# Patient Record
Sex: FEMALE | HISPANIC OR LATINO | Employment: OTHER | URBAN - METROPOLITAN AREA
[De-identification: names, ages, dates, MRNs, and addresses within clinical notes are randomized per-mention and may not be internally consistent; named-entity substitution may affect disease eponyms.]

---

## 2017-02-22 ENCOUNTER — OFFICE VISIT (OUTPATIENT)
Dept: INTERNAL MEDICINE | Facility: MEDICAL CENTER | Age: 27
End: 2017-02-22
Payer: MEDICAID

## 2017-02-22 ENCOUNTER — HOSPITAL ENCOUNTER (OUTPATIENT)
Facility: MEDICAL CENTER | Age: 27
End: 2017-02-22
Attending: INTERNAL MEDICINE
Payer: MEDICAID

## 2017-02-22 VITALS
DIASTOLIC BLOOD PRESSURE: 84 MMHG | TEMPERATURE: 97.4 F | WEIGHT: 224.8 LBS | BODY MASS INDEX: 50.57 KG/M2 | SYSTOLIC BLOOD PRESSURE: 136 MMHG | OXYGEN SATURATION: 98 % | HEIGHT: 56 IN | HEART RATE: 85 BPM

## 2017-02-22 DIAGNOSIS — E66.01 MORBID OBESITY, UNSPECIFIED OBESITY TYPE (HCC): ICD-10-CM

## 2017-02-22 DIAGNOSIS — R73.03 PREDIABETES: ICD-10-CM

## 2017-02-22 DIAGNOSIS — Z00.00 HEALTHCARE MAINTENANCE: ICD-10-CM

## 2017-02-22 DIAGNOSIS — B96.89 BACTERIAL VAGINOSIS: ICD-10-CM

## 2017-02-22 DIAGNOSIS — E55.9 VITAMIN D DEFICIENCY: ICD-10-CM

## 2017-02-22 DIAGNOSIS — N76.0 BACTERIAL VAGINOSIS: ICD-10-CM

## 2017-02-22 DIAGNOSIS — N89.8 VAGINAL DISCHARGE: ICD-10-CM

## 2017-02-22 DIAGNOSIS — G47.33 OSA (OBSTRUCTIVE SLEEP APNEA): ICD-10-CM

## 2017-02-22 PROCEDURE — 87491 CHLMYD TRACH DNA AMP PROBE: CPT

## 2017-02-22 PROCEDURE — 99000 SPECIMEN HANDLING OFFICE-LAB: CPT | Mod: GC | Performed by: INTERNAL MEDICINE

## 2017-02-22 PROCEDURE — 87591 N.GONORRHOEAE DNA AMP PROB: CPT

## 2017-02-22 PROCEDURE — 87661 TRICHOMONAS VAGINALIS AMPLIF: CPT

## 2017-02-22 PROCEDURE — 99214 OFFICE O/P EST MOD 30 MIN: CPT | Mod: 25,GC | Performed by: INTERNAL MEDICINE

## 2017-02-22 RX ORDER — METRONIDAZOLE 500 MG/1
500 TABLET ORAL 2 TIMES DAILY
Qty: 14 TAB | Refills: 0 | Status: SHIPPED | OUTPATIENT
Start: 2017-02-22 | End: 2017-06-07

## 2017-02-22 ASSESSMENT — ENCOUNTER SYMPTOMS
EYE DISCHARGE: 0
STRIDOR: 0
FEVER: 0
DIZZINESS: 0
PSYCHIATRIC NEGATIVE: 1
MYALGIAS: 0
GASTROINTESTINAL NEGATIVE: 1
CHILLS: 0
LOSS OF CONSCIOUSNESS: 0
EYE REDNESS: 0
COUGH: 0
NEUROLOGICAL NEGATIVE: 1

## 2017-02-22 ASSESSMENT — PATIENT HEALTH QUESTIONNAIRE - PHQ9: CLINICAL INTERPRETATION OF PHQ2 SCORE: 0

## 2017-02-23 DIAGNOSIS — N89.8 VAGINAL DISCHARGE: ICD-10-CM

## 2017-02-23 PROCEDURE — 87591 N.GONORRHOEAE DNA AMP PROB: CPT

## 2017-02-23 PROCEDURE — 87661 TRICHOMONAS VAGINALIS AMPLIF: CPT

## 2017-02-23 PROCEDURE — 87491 CHLMYD TRACH DNA AMP PROBE: CPT

## 2017-02-23 NOTE — PROGRESS NOTES
Established Patient    Christi presents today with the following:    CC: Follow up    HPI:     25 y/o F with a h/o obesity, Prediabetes, HTN, Vitamin D deficiency, Developmental Delay, DELIA is here for follow up.   Patient is accompanied by her sister.     Vaginal discharge-  Foul smelling vaginal discharge for 3-4 days.  Also c/o vaginal irritation and pruritis.  Not sexually active.  Denies pelvic pain or fever.      HTN- BP stable off lisinopril.  /84.      Prediabetes- A1C 6.3 (11/2015).  Hasn't done lab work.  Counseled about diet and exercise.  Counseled about increased risk of diabetes.      Vitamin D deficiency-  On vitamin D.  Patient hasn't done lab work.  Copy of last lab order was given.      Obesity- Gained 2 lbs since last visit.  Hasn't been exercising regularly.  Not following low carb and low calorie diet.   Her sister is her care giver.  Extensively counseled about having strict exercise regimen.  Exercise 4-5 days per week and more than 45 minutes per day.       DELIA- on CPAP.  Stopped using CPAP.  Her pulmonlogist retired.  Requesting new referral.    HCM- last pap 2 yrs ago and wnl.          Patient Active Problem List    Diagnosis Date Noted   • Prediabetes 05/25/2016   • Vitamin D deficiency 05/25/2016   • Low serum HDL 05/25/2016   • Hypertriglyceridemia 05/25/2016   • DELIA (obstructive sleep apnea) 05/25/2016   • Morbid obesity (CMS-HCC) 05/25/2016       Current Outpatient Prescriptions   Medication Sig Dispense Refill   • metronidazole (FLAGYL) 500 MG Tab Take 1 Tab by mouth 2 Times a Day. 14 Tab 0   • vitamin D, Ergocalciferol, (DRISDOL) 55052 UNITS Cap capsule Take 1 Cap by mouth every 7 days. 8 Cap 5   • topiramate (TOPAMAX) 25 MG Tab Take 25 mg by mouth 2 times a day.     • methylphenidate (METADATE ER) 20 MG CR tablet Take  by mouth.       No current facility-administered medications for this visit.       ROS: As per HPI. Additional pertinent symptoms as noted  "below.      Review of Systems   Constitutional: Negative for fever and chills.   HENT: Negative for congestion.    Eyes: Negative for discharge and redness.   Respiratory: Negative for cough and stridor.    Cardiovascular: Negative for chest pain.   Gastrointestinal: Negative.    Genitourinary: Negative for dysuria and urgency.        Vaginal discharge and pruritis   Musculoskeletal: Negative for myalgias.   Skin: Positive for itching.   Neurological: Negative.  Negative for dizziness and loss of consciousness.   Endo/Heme/Allergies: Negative.    Psychiatric/Behavioral: Negative.        /84 mmHg  Pulse 85  Temp(Src) 36.3 °C (97.4 °F)  Ht 1.429 m (4' 8.26\")  Wt 101.969 kg (224 lb 12.8 oz)  BMI 49.93 kg/m2  SpO2 98%    Physical Exam   Constitutional:  oriented to person, place, and time. No distress.   Eyes: Pupils are equal, round, and reactive to light. No scleral icterus.  Neck: Neck supple. No thyromegaly present.   Cardiovascular: Normal rate, regular rhythm and normal heart sounds.  Exam reveals no gallop and no friction rub.  No murmur heard.  Pulmonary/Chest: Breath sounds normal.  Musculoskeletal:   no edema.   Abdomen:  Soft, BS+, Non tender, non distended  Neurological: alert and oriented to person, place, and time.   Skin: No cyanosis. Nails show no clubbing.  :  External genitalia:  No rash or vesicles.  No erythema or excoriation.             Mucoid grayish vaginal discharge.        Assessment and Plan    1. DELIA (obstructive sleep apnea)  - Will refer to pulmonology.   -Con't CPAP until evaluated by pulm.   - REFERRAL TO PULMONOLOGY    2. Prediabetes  -Counseled about getting new labs.   -Copy of previous labs given    3. Vitamin D deficiency  -Con't Vitamin D.   -Will get Vitamin D level    4. Morbid obesity, unspecified obesity type (CMS-HCC)  -gained 2 lbs.    -At risk of metabolic syndrome.  Patient also has pre-diabetes.  -Extensively counseled about weight loss and strict exercise " regimen and diet control  -CTM    5. Bacterial Vaginosis  -Grayish mucoid vaginal discharge.    -Wet [place]: Clue cell+.  No hyphae or motile trichomonas  -Will treat with Metronidazole 500 BID for  7 days  -Vaginal swab was sent for further evaluation  - CHLAMYDIA/GC PCR URINE OR SWAB; Future  - TRICHOMONAS VAGINALIS BY TMA; Future      6. Healthcare maintenance  -Pap smear due 2018  -Basic labs pending.     7. HTN  -Resolved.  Stable off lisinopril for 1 yr now.  Will continue to monitor BP.           Followup: No Follow-up on file.      Signed by: Luzma Thomas M.D.

## 2017-02-25 LAB
C TRACH DNA SPEC QL NAA+PROBE: NEGATIVE
N GONORRHOEA DNA SPEC QL NAA+PROBE: NEGATIVE
SOURCE 9121: NORMAL

## 2017-03-06 LAB
SPEC CONTAINER SPEC: NORMAL
SPECIMEN SOURCE: NORMAL
T VAGINALIS RRNA SPEC QL NAA+PROBE: NEGATIVE

## 2017-03-21 DIAGNOSIS — E78.1 HYPERTRIGLYCERIDEMIA: ICD-10-CM

## 2017-03-21 DIAGNOSIS — E55.9 VITAMIN D DEFICIENCY: ICD-10-CM

## 2017-03-21 DIAGNOSIS — R73.03 PREDIABETES: ICD-10-CM

## 2017-06-07 ENCOUNTER — OFFICE VISIT (OUTPATIENT)
Dept: INTERNAL MEDICINE | Facility: MEDICAL CENTER | Age: 27
End: 2017-06-07
Payer: MEDICAID

## 2017-06-07 VITALS
OXYGEN SATURATION: 96 % | HEART RATE: 84 BPM | BODY MASS INDEX: 49.75 KG/M2 | TEMPERATURE: 98.1 F | SYSTOLIC BLOOD PRESSURE: 122 MMHG | HEIGHT: 57 IN | DIASTOLIC BLOOD PRESSURE: 84 MMHG | WEIGHT: 230.6 LBS

## 2017-06-07 DIAGNOSIS — G47.33 OSA (OBSTRUCTIVE SLEEP APNEA): ICD-10-CM

## 2017-06-07 DIAGNOSIS — Z00.00 HEALTHCARE MAINTENANCE: ICD-10-CM

## 2017-06-07 DIAGNOSIS — R73.03 PREDIABETES: ICD-10-CM

## 2017-06-07 DIAGNOSIS — E55.9 VITAMIN D DEFICIENCY: ICD-10-CM

## 2017-06-07 DIAGNOSIS — E66.01 MORBID OBESITY, UNSPECIFIED OBESITY TYPE (HCC): ICD-10-CM

## 2017-06-07 PROCEDURE — 99214 OFFICE O/P EST MOD 30 MIN: CPT | Mod: GC | Performed by: INTERNAL MEDICINE

## 2017-06-07 RX ORDER — ERGOCALCIFEROL 1.25 MG/1
50000 CAPSULE ORAL
Qty: 8 CAP | Refills: 5 | Status: SHIPPED | OUTPATIENT
Start: 2017-06-07 | End: 2017-07-26 | Stop reason: SDUPTHER

## 2017-06-07 ASSESSMENT — ENCOUNTER SYMPTOMS
CHILLS: 0
NECK PAIN: 0
SHORTNESS OF BREATH: 0
NAUSEA: 0
PALPITATIONS: 0
HEADACHES: 0
MYALGIAS: 0
DEPRESSION: 0
FEVER: 0
BRUISES/BLEEDS EASILY: 0
DIZZINESS: 0
POLYDIPSIA: 0
SORE THROAT: 0
ROS SKIN COMMENTS: SCALY DRY SKIN
ABDOMINAL PAIN: 0
VOMITING: 0
HEARTBURN: 0
COUGH: 0
EYE PAIN: 0
TINGLING: 0
EYE DISCHARGE: 0

## 2017-06-07 NOTE — MR AVS SNAPSHOT
"Christi Mcgarry   2017 1:15 PM   Office Visit   MRN: [id number]    Department:  [facility] Med - Internal Med   Dept Phone:  [phone]    Description:  Female : 1990   Provider:  Luzma Thomas M.D.           Reason for Visit     Results Labs     Medication Refill Vit D     Other Skin peeling from hands       Allergies as of 2017     No Known Allergies      You were diagnosed with     Vitamin D deficiency   [3817304]       DELIA (obstructive sleep apnea)   [718612]       Morbid obesity, unspecified obesity type (CMS-[facility])   [1561339]       Healthcare maintenance   [449354]         Vital Signs     Blood Pressure Pulse Temperature Height Weight Body Mass Index    122/84 mmHg 84 36.7 °C (98.1 °F) 1.441 m (4' 8.75\") 104.599 kg (230 lb 9.6 oz) 50.37 kg/m2    Oxygen Saturation Smoking Status                96% Never Smoker           Basic Information     Date Of Birth Sex Race Ethnicity Preferred Language Language for Written Material    1990 Female  or   Origin (Yi,South Sudanese,Zambian,Thai, etc) English Yi      Your appointments     2017  1:15 PM   Established Patient with Luzma Thomas M.D.   [facility] Med - Internal Medicine (--)    [address] [place] [id number] [phone]           You will be receiving a confirmation call a few days before your appointment from our automated call confirmation system.              Problem List              ICD-10-CM Priority Class Noted - Resolved    Prediabetes R73.03   2016 - Present    Vitamin D deficiency E55.9   2016 - Present    Low serum HDL R74.8   2016 - Present    Hypertriglyceridemia E78.1   2016 - Present    DELIA (obstructive sleep apnea) G47.33   2016 - Present    Morbid obesity (CMS-HCC) E66.01   2016 - Present      Health Maintenance        Date Due Completion Dates    IMM VARICELLA (CHICKENPOX) VACCINE (1 of 2 - 2 Dose " Adolescent Series) 11/13/2004 ---    PAP SMEAR 11/13/2012 ---    IMM DTaP/Tdap/Td Vaccine (7 - Td) 4/28/2016 4/28/2006, 4/23/1996, 7/20/1993, 2/25/1993, 9/11/1992, 2/14/1992            Current Immunizations     Dtap Vaccine 4/23/1996, 7/20/1993, 2/25/1993, 9/11/1992, 2/14/1992    HIB Vaccine(PEDVAX) 7/20/1993, 9/11/1992, 2/14/1992    HPV Quadrivalent Vaccine (GARDASIL) 10/28/2009, 7/10/2008, 1/24/2008    Hepatitis A Vaccine, Ped/Adol 1/24/2008, 4/28/2006    Hepatitis B Vaccine Non-Recombivax (Ped/Adol) 12/15/1998, 3/19/1998, 6/17/1997    Influenza Vaccine Quad Inj (Pf) 12/3/2015    MMR Vaccine 4/23/1996, 2/25/1993    Meningococcal Conjugate Vaccine MCV4 (Menactra) 1/24/2008    OPV - Historical Data 9/9/1996, 7/20/1993, 9/11/1992, 2/14/1992    Tdap Vaccine 4/28/2006      Below and/or attached are the medications your provider expects you to take. Review all of your home medications and newly ordered medications with your provider and/or pharmacist. Follow medication instructions as directed by your provider and/or pharmacist. Please keep your medication list with you and share with your provider. Update the information when medications are discontinued, doses are changed, or new medications (including over-the-counter products) are added; and carry medication information at all times in the event of emergency situations     Allergies:  No Known Allergies          Medications  Valid as of: June 07, 2017 -  1:57 PM    Generic Name Brand Name Tablet Size Instructions for use    Ergocalciferol (Cap) DRISDOL 68128 UNITS Take 1 Cap by mouth every 7 days.        .                 Medicines prescribed today were sent to:     [id number] - [place] - [address] [place] [address]    Phone: [phone] Fax: [phone]    Open 24 Hours?: No      Medication refill instructions:       If your prescription bottle indicates you have medication refills left, it is not  necessary to call your provider’s office. Please contact your pharmacy and they will refill your medication.    If your prescription bottle indicates you do not have any refills left, you may request refills at any time through one of the following ways: The online Brightstar system (except Urgent Care), by calling your provider’s office, or by asking your pharmacy to contact your provider’s office with a refill request. Medication refills are processed only during regular business hours and may not be available until the next business day. Your provider may request additional information or to have a follow-up visit with you prior to refilling your medication.   *Please Note: Medication refills are assigned a new Rx number when refilled electronically. Your pharmacy may indicate that no refills were authorized even though a new prescription for the same medication is available at the pharmacy. Please request the medicine by name with the pharmacy before contacting your provider for a refill.        Your To Do List     Future Labs/Procedures Complete By Expires    CBC WITH DIFFERENTIAL  As directed 6/7/2018    COMP METABOLIC PANEL  As directed 6/7/2018      Referral     A referral request has been sent to our patient care coordination department. Please allow 3-5 business days for us to process this request and contact you either by phone or mail. If you do not hear from us by the 5th business day, please call us at [phone].        Instructions    Follow up in 5 wks  Labs before next visit          Brightstar Access Code: [email or web address]  Expires: 7/7/2017  1:57 PM    Brightstar  A secure, online tool to manage your health information     [employer]ali’s Brightstar® is a secure, online tool that connects you to your personalized health information from the privacy of your home -- day or night - making it very easy for you to manage your healthcare. Once the activation process is completed, you can even access your  medical information using the [employer] gonzales, which is available for free in the Apple Gonzales store or Google Play store.     [employer] provides the following levels of access (as shown below):   My Chart Features   [facility] Doctor [facility]  Specialists [facility]  Urgent  Care Non-[facility]  Primary Care  Doctor   Email your healthcare team securely and privately 24/7 X X X    Manage appointments: schedule your next appointment; view details of past/upcoming appointments X      Request prescription refills. X      View recent personal medical records, including lab and immunizations X X X X   View health record, including health history, allergies, medications X X X X   Read reports about your outpatient visits, procedures, consult and ER notes X X X X   See your discharge summary, which is a recap of your hospital and/or ER visit that includes your diagnosis, lab results, and care plan. X X       How to register for [employer]:  1. Go to  [email or web address].  2. Click on the Sign Up Now box, which takes you to the New Member Sign Up page. You will need to provide the following information:  a. Enter your [employer] Access Code exactly as it appears at the top of this page. (You will not need to use this code after you’ve completed the sign-up process. If you do not sign up before the expiration date, you must request a new code.)   b. Enter your date of birth.   c. Enter your home email address.   d. Click Submit, and follow the next screen’s instructions.  3. Create a [employer] ID. This will be your [employer] login ID and cannot be changed, so think of one that is secure and easy to remember.  4. Create a [employer] password. You can change your password at any time.  5. Enter your Password Reset Question and Answer. This can be used at a later time if you forget your password.   6. Enter your e-mail address. This allows you to receive e-mail notifications when new information is available in [employer].  7. Click Sign Up. You  can now view your health information.    For assistance activating your [employer] account, call [phone]

## 2017-06-08 NOTE — PROGRESS NOTES
Established Patient    Christi presents today with the following:    CC: Follow up    HPI:     Morbid Obesity  Prediabetes  She started working and hasn't found time to exercise.  She states she feels tired after work.   She gained weight since last visit.  She hasn't been following low carb diet.  Discussed with patient about importance of weight loss and following low carb diet. She states she knows how to read nutrition labels and identify Carb in the diet.   Patient brought her mom to her appointment who cooks for her.  Her mother understood that she should follow low carb diet.  She has significantly increased risk of diabetes and metabolic syndrome.  Her A1C is 6.4.  T, HDL 58,  LDL 41.    Vitamin D insufficiency- Vitamin D level 22.  Con't Vitamin D    DELIA- referral to new pulmonologist sent as her pulmonologist moved.  Still hasn't able to made an appointment.  Pulmonologist number was given.     Dry skin- c/o scaly dry skin on the hand.      HCM- Pap: UTD.  Due in 2018          Patient Active Problem List    Diagnosis Date Noted   • Prediabetes 2016   • Vitamin D deficiency 2016   • Low serum HDL 2016   • Hypertriglyceridemia 2016   • DELIA (obstructive sleep apnea) 2016   • Morbid obesity (CMS-HCC) 2016       Current Outpatient Prescriptions   Medication Sig Dispense Refill   • vitamin D, Ergocalciferol, (DRISDOL) 21328 UNITS Cap capsule Take 1 Cap by mouth every 7 days. 8 Cap 5     No current facility-administered medications for this visit.       ROS: As per HPI. Additional pertinent symptoms as noted below.      Review of Systems   Constitutional: Negative for fever and chills.   HENT: Negative for nosebleeds and sore throat.    Eyes: Negative for pain and discharge.   Respiratory: Negative for cough and shortness of breath.    Cardiovascular: Negative for chest pain and palpitations.   Gastrointestinal: Negative for heartburn, nausea, vomiting and abdominal  "pain.   Genitourinary: Negative for dysuria and urgency.   Musculoskeletal: Negative for myalgias and neck pain.   Skin: Negative for itching.        Scaly dry skin   Neurological: Negative for dizziness, tingling and headaches.   Endo/Heme/Allergies: Negative for polydipsia. Does not bruise/bleed easily.   Psychiatric/Behavioral: Negative for depression and suicidal ideas.       /84 mmHg  Pulse 84  Temp(Src) 36.7 °C (98.1 °F)  Ht 1.441 m (4' 8.75\")  Wt 104.599 kg (230 lb 9.6 oz)  BMI 50.37 kg/m2  SpO2 96%    Physical Exam   Constitutional:  oriented to person, place, and time. No distress.   Eyes: Pupils are equal, round, and reactive to light. No scleral icterus.  Ears:  TM non bulging.  Left ear: Cerumen.  Not impacted  Neck: Neck supple. No thyromegaly present.   Cardiovascular: Normal rate, regular rhythm and normal heart sounds.  Exam reveals no gallop and no friction rub.  No murmur heard.  Pulmonary/Chest: Breath sounds normal. Chest wall is not dull to percussion.   Musculoskeletal:   no edema.   Abdomen:  Soft, BS+, Non tender. No purple striaea   Lymphadenopathy: no cervical adenopathy  Neurological: alert and oriented to person, place, and time.   Skin: No cyanosis. Nails show no clubbing.        Assessment and Plan    1. Morbid obesity, unspecified obesity type (CMS-HCC)  2. Prediabetes  - Counseled about risks of metabolic syndrome and diabetes  - Patient and her mother understood  - Encouraged exercise and low carb diet  - Patient states she will commit to exercising.    - Her mother who cooks at home understands that she should follow low carb diet  - Will follow in 5 wks to evaluate progress in weight loss  - [facility] Requested:: Registered Dietitian for Medical Nutrition Therapy, Weight Management Program, Medicare Obesity Counseling; Reason for Visit:: Overweight/Obesity    3. Vitamin D insufficiency  - Vitamin D 22  - Con't " Ergocalciferol  - CTM    4. DELIA (obstructive sleep apnea)  - Referred to pulmonologist  - Number given to the patient      5. Healthcare maintenance  - Pap UTD.  - Will check basic lab  - CBC WITH DIFFERENTIAL; Future  - COMP METABOLIC PANEL; Future      Followup: No Follow-up on file.      Signed by: Luzma Thomas M.D.

## 2017-07-17 DIAGNOSIS — Z00.00 HEALTHCARE MAINTENANCE: ICD-10-CM

## 2017-07-26 ENCOUNTER — OFFICE VISIT (OUTPATIENT)
Dept: INTERNAL MEDICINE | Facility: MEDICAL CENTER | Age: 27
End: 2017-07-26
Payer: MEDICAID

## 2017-07-26 VITALS
DIASTOLIC BLOOD PRESSURE: 80 MMHG | WEIGHT: 230.2 LBS | BODY MASS INDEX: 49.66 KG/M2 | SYSTOLIC BLOOD PRESSURE: 108 MMHG | HEART RATE: 87 BPM | OXYGEN SATURATION: 96 % | TEMPERATURE: 97.7 F | HEIGHT: 57 IN

## 2017-07-26 DIAGNOSIS — K21.9 GASTROESOPHAGEAL REFLUX DISEASE WITHOUT ESOPHAGITIS: ICD-10-CM

## 2017-07-26 DIAGNOSIS — E55.9 VITAMIN D DEFICIENCY: ICD-10-CM

## 2017-07-26 DIAGNOSIS — R73.03 PREDIABETES: ICD-10-CM

## 2017-07-26 DIAGNOSIS — E66.01 MORBID OBESITY, UNSPECIFIED OBESITY TYPE (HCC): ICD-10-CM

## 2017-07-26 DIAGNOSIS — G44.209 TENSION-TYPE HEADACHE, NOT INTRACTABLE, UNSPECIFIED CHRONICITY PATTERN: ICD-10-CM

## 2017-07-26 PROCEDURE — 99214 OFFICE O/P EST MOD 30 MIN: CPT | Mod: GC | Performed by: INTERNAL MEDICINE

## 2017-07-26 NOTE — PATIENT INSTRUCTIONS
Take Tylenol Extra Strength or Excedrin for headache.    Avoid sun.  Stay in cool place.     Start Omeprazole for acid reflux    May download apps such as fitness pal or other diabetic nutrition ysabel to count carb.      1800 Calorie Diet for Diabetes Meal Planning  The 1800 calorie diet is designed for eating up to 1800 calories each day. Following this diet and making healthy meal choices can help improve overall health. This diet controls blood sugar (glucose) levels and can also help lower blood pressure and cholesterol.  SERVING SIZES  Measuring foods and serving sizes helps to make sure you are getting the right amount of food. The list below tells how big or small some common serving sizes are:  · 1 oz.........4 stacked dice.   · 3 oz.........Deck of cards.   · 1 tsp........Tip of little finger.   · 1 tbs........Thumb.   · 2 tbs........Golf ball.   · ½ cup.......Half of a fist.   · 1 cup........A fist.   GUIDELINES FOR CHOOSING FOODS  The goal of this diet is to eat a variety of foods and limit calories to 1800 each day. This can be done by choosing foods that are low in calories and fat. The diet also suggests eating small amounts of food frequently. Doing this helps control your blood glucose levels so they do not get too high or too low. Each meal or snack may include a protein food source to help you feel more satisfied and to stabilize your blood glucose. Try to eat about the same amount of food around the same time each day. This includes weekend days, travel days, and days off work. Space your meals about 4 to 5 hours apart and add a snack between them if you wish.   For example, a daily food plan could include breakfast, a morning snack, lunch, dinner, and an evening snack. Healthy meals and snacks include whole grains, vegetables, fruits, lean meats, poultry, fish, and dairy products. As you plan your meals, select a variety of foods. Choose from the bread and starch, vegetable, fruit, dairy, and  meat/protein groups. Examples of foods from each group and their suggested serving sizes are listed below. Use measuring cups and spoons to become familiar with what a healthy portion looks like.  Bread and Starch  Each serving equals 15 grams of carbohydrates.  · 1 slice bread.   · ¼ bagel.   · ¾ cup cold cereal (unsweetened).   · ½ cup hot cereal or mashed potatoes.   · 1 small potato (size of a computer mouse).   ·  cup cooked pasta or rice.   · ½ English muffin.   · 1 cup broth-based soup.   · 3 cups of popcorn.   · 4 to 6 whole-wheat crackers.   · ½ cup cooked beans, peas, or corn.   Vegetable  Each serving equals 5 grams of carbohydrates.  · ½ cup cooked vegetables.   · 1 cup raw vegetables.   · ½ cup tomato or vegetable juice.   Fruit  Each serving equals 15 grams of carbohydrates.  · 1 small apple or orange.   · 1¼ cup watermelon or strawberries.   · ½ cup applesauce (no sugar added).   · 2 tbs raisins.   · ½ banana.   · ½ cup canned fruit, packed in water, its own juice, or sweetened with a sugar substitute.   · ½ cup unsweetened fruit juice.   Dairy  Each serving equals 12 to 15 grams of carbohydrates.  · 1 cup fat-free milk.   · 6 oz artificially sweetened yogurt or plain yogurt.   · 1 cup low-fat buttermilk.   · 1 cup soy milk.   · 1 cup almond milk.   Meat/Protein  · 1 large egg.   · 2 to 3 oz meat, poultry, or fish.   · ¼ cup low-fat cottage cheese.   · 1 tbs peanut butter.   · 1 oz low-fat cheese.   · ¼ cup tuna in water.   · ½ cup tofu.   Fat  · 1 tsp oil.   · 1 tsp trans-fat-free margarine.   · 1 tsp butter.   · 1 tsp mayonnaise.   · 2 tbs avocado.   · 1 tbs salad dressing.   · 1 tbs cream cheese.   · 2 tbs sour cream.   SAMPLE 1800 CALORIE DIET PLAN  Breakfast  · ¾ cup unsweetened cereal (1 carb serving).   · 1 cup fat-free milk (1 carb serving).   · 1 slice whole-wheat toast (1 carb serving).   · ½ small banana (1 carb serving).   · 1 scrambled egg.   · 1 tsp trans-fat-free margarine.    Lunch  · Tuna sandwich.   · 2 slices whole-wheat bread (2 carb servings).   · ½ cup canned tuna in water, drained.   · 1 tbs reduced fat mayonnaise.   · 1 stalk celery, chopped.   · 2 slices tomato.   · 1 lettuce leaf.   · 1 cup carrot sticks.   · 24 to 30 seedless grapes (2 carb servings).   · 6 oz light yogurt (1 carb serving).   Afternoon Snack  · 3 roma cracker squares (1 carb serving).   · Fat-free milk, 1 cup (1 carb serving).   · 1 tbs peanut butter.   Dinner  · 3 oz salmon, broiled with 1 tsp oil.   · 1 cup mashed potatoes (2 carb servings) with 1 tsp trans-fat-free margarine.   · 1 cup fresh or frozen green beans.   · 1 cup steamed asparagus.   · 1 cup fat-free milk (1 carb serving).   Evening Snack  · 3 cups air-popped popcorn (1 carb serving).   · 2 tbs parmesan cheese sprinkled on top.   MEAL PLAN  Use this worksheet to help you make a daily meal plan based on the 1800 calorie diet suggestions. If you are using this plan to help you control your blood glucose, you may interchange carbohydrate-containing foods (dairy, starches, and fruits). Select a variety of fresh foods of varying colors and flavors. The total amount of carbohydrate in your meals or snacks is more important than making sure you include all of the food groups every time you eat. Choose from the following foods to build your day's meals:  · 8 Starches.   · 4 Vegetables.   · 3 Fruits.   · 2 Dairy.   · 6 to 7 oz Meat/Protein.   · Up to 4 Fats.   Your dietician can use this worksheet to help you decide how many servings and which types of foods are right for you.  BREAKFAST  Food Group and Servings / Food Choice  Starch ________________________________________________________  Dairy _________________________________________________________  Fruit _________________________________________________________  Meat/Protein __________________________________________________  Fat  ___________________________________________________________  LUNCH  Food Group and Servings / Food Choice  Starch ________________________________________________________  Meat/Protein __________________________________________________  Vegetable _____________________________________________________  Fruit _________________________________________________________  Dairy _________________________________________________________  Fat ___________________________________________________________  AFTERNOON SNACK  Food Group and Servings / Food Choice  Starch ________________________________________________________  Meat/Protein __________________________________________________  Fruit __________________________________________________________  Dairy _________________________________________________________  DINNER  Food Group and Servings / Food Choice  Starch _________________________________________________________  Meat/Protein ___________________________________________________  Dairy __________________________________________________________  Vegetable ______________________________________________________  Fruit ___________________________________________________________  Fat ____________________________________________________________  EVENING SNACK  Food Group and Servings / Food Choice  Fruit __________________________________________________________  Meat/Protein ___________________________________________________  Dairy __________________________________________________________  Starch _________________________________________________________  DAILY TOTALS  Starch ____________________________  Vegetable _________________________  Fruit _____________________________  Dairy _____________________________  Meat/Protein______________________  Fat _______________________________  Document Released: 07/10/2006 Document Revised: 03/11/2013 Document Reviewed: 11/02/2012  ExitCare® Patient Information ©2013 [employer].

## 2017-07-26 NOTE — MR AVS SNAPSHOT
"Christi Mcgarry   2017 1:15 PM   Office Visit   MRN: [id number]    Department:  [facility] - Internal Med   Dept Phone:  [phone]    Description:  Female : 1990   Provider:  Luzma Thomas M.D.           Reason for Visit     Obesity     Results Labs     Headache x 8 days    Abdominal Pain     Referral Needed Nutrition services in [place]     Medication Refill Vit D      Allergies as of 2017     No Known Allergies      You were diagnosed with     Prediabetes   [843022]       Morbid obesity, unspecified obesity type (CMS-HCC)   [6326591]       Vitamin D deficiency   [0495494]       Gastroesophageal reflux disease without esophagitis   [286187]       Tension-type headache, not intractable, unspecified chronicity pattern   [9043080]         Vital Signs     Blood Pressure Pulse Temperature Height Weight Body Mass Index    108/80 mmHg 87 36.5 °C (97.7 °F) 1.441 m (4' 8.73\") 104.418 kg (230 lb 3.2 oz) 50.29 kg/m2    Oxygen Saturation Smoking Status                96% Never Smoker           Basic Information     Date Of Birth Sex Race Ethnicity Preferred Language Language for Written Material    1990 Female  or   Origin ([place],Lebanese,Polish,[place], etc) English [place]      Your appointments     Oct 04, 2017  1:15 PM   Established Patient with Kiet Torres M.D.   [facility] - Internal Medicine (--)    [address] [place] [id number] [phone]           You will be receiving a confirmation call a few days before your appointment from our automated call confirmation system.              Problem List              ICD-10-CM Priority Class Noted - Resolved    Prediabetes R73.03   2016 - Present    Vitamin D deficiency E55.9   2016 - Present    DELIA (obstructive sleep apnea) G47.33   2016 - Present    Morbid obesity (CMS-HCC) E66.01   2016 - Present    Gastroesophageal reflux " disease without esophagitis K21.9   7/26/2017 - Present    Tension-type headache G44.209   7/26/2017 - Present    Tension-type headache, not intractable G44.209   7/26/2017 - Present      Health Maintenance        Date Due Completion Dates    IMM VARICELLA (CHICKENPOX) VACCINE (1 of 2 - 2 Dose Adolescent Series) 11/13/2004 ---    PAP SMEAR 11/13/2012 ---    IMM DTaP/Tdap/Td Vaccine (7 - Td) 4/28/2016 4/28/2006, 4/23/1996, 7/20/1993, 2/25/1993, 9/11/1992, 2/14/1992    IMM INFLUENZA (1) 9/1/2017 12/3/2015            Current Immunizations     Dtap Vaccine 4/23/1996, 7/20/1993, 2/25/1993, 9/11/1992, 2/14/1992    HIB Vaccine(PEDVAX) 7/20/1993, 9/11/1992, 2/14/1992    HPV Quadrivalent Vaccine (GARDASIL) 10/28/2009, 7/10/2008, 1/24/2008    Hepatitis A Vaccine, Ped/Adol 1/24/2008, 4/28/2006    Hepatitis B Vaccine Non-Recombivax (Ped/Adol) 12/15/1998, 3/19/1998, 6/17/1997    Influenza Vaccine Quad Inj (Pf) 12/3/2015    MMR Vaccine 4/23/1996, 2/25/1993    Meningococcal Conjugate Vaccine MCV4 (Menactra) 1/24/2008    OPV - Historical Data 9/9/1996, 7/20/1993, 9/11/1992, 2/14/1992    Tdap Vaccine 4/28/2006      Below and/or attached are the medications your provider expects you to take. Review all of your home medications and newly ordered medications with your provider and/or pharmacist. Follow medication instructions as directed by your provider and/or pharmacist. Please keep your medication list with you and share with your provider. Update the information when medications are discontinued, doses are changed, or new medications (including over-the-counter products) are added; and carry medication information at all times in the event of emergency situations     Allergies:  No Known Allergies          Medications  Valid as of: July 26, 2017 -  2:10 PM    Generic Name Brand Name Tablet Size Instructions for use    Ergocalciferol (Cap) DRISDOL 74490 UNITS Take 1 Cap by mouth every 7 days.        .                 Medicines  prescribed today were sent to:     [facility]Y #[id number] - [place], [place] - [address]    [address] [place] [address]    Phone: [phone] Fax: [phone]    Open 24 Hours?: No      Medication refill instructions:       If your prescription bottle indicates you have medication refills left, it is not necessary to call your provider’s office. Please contact your pharmacy and they will refill your medication.    If your prescription bottle indicates you do not have any refills left, you may request refills at any time through one of the following ways: The online [employer] system (except Urgent Care), by calling your provider’s office, or by asking your pharmacy to contact your provider’s office with a refill request. Medication refills are processed only during regular business hours and may not be available until the next business day. Your provider may request additional information or to have a follow-up visit with you prior to refilling your medication.   *Please Note: Medication refills are assigned a new Rx number when refilled electronically. Your pharmacy may indicate that no refills were authorized even though a new prescription for the same medication is available at the pharmacy. Please request the medicine by name with the pharmacy before contacting your provider for a refill.        Referral     A referral request has been sent to our patient care coordination department. Please allow 3-5 business days for us to process this request and contact you either by phone or mail. If you do not hear from us by the 5th business day, please call us at [phone].        Instructions    Take Tylenol Extra Strength or Excedrin for headache.    Avoid sun.  Stay in cool place.     Start Omeprazole for acid reflux    May download apps such as fitness pal or other diabetic nutrition ysabel to count carb.      1800 Calorie Diet for Diabetes Meal Planning  The 1800 calorie diet is designed  for eating up to 1800 calories each day. Following this diet and making healthy meal choices can help improve overall health. This diet controls blood sugar (glucose) levels and can also help lower blood pressure and cholesterol.  SERVING SIZES  Measuring foods and serving sizes helps to make sure you are getting the right amount of food. The list below tells how big or small some common serving sizes are:  · 1 oz.........4 stacked dice.   · 3 oz.........Deck of cards.   · 1 tsp........Tip of little finger.   · 1 tbs........Thumb.   · 2 tbs........Golf ball.   · ½ cup.......Half of a fist.   · 1 cup........A fist.   GUIDELINES FOR CHOOSING FOODS  The goal of this diet is to eat a variety of foods and limit calories to 1800 each day. This can be done by choosing foods that are low in calories and fat. The diet also suggests eating small amounts of food frequently. Doing this helps control your blood glucose levels so they do not get too high or too low. Each meal or snack may include a protein food source to help you feel more satisfied and to stabilize your blood glucose. Try to eat about the same amount of food around the same time each day. This includes weekend days, travel days, and days off work. Space your meals about 4 to 5 hours apart and add a snack between them if you wish.   For example, a daily food plan could include breakfast, a morning snack, lunch, dinner, and an evening snack. Healthy meals and snacks include whole grains, vegetables, fruits, lean meats, poultry, fish, and dairy products. As you plan your meals, select a variety of foods. Choose from the bread and starch, vegetable, fruit, dairy, and meat/protein groups. Examples of foods from each group and their suggested serving sizes are listed below. Use measuring cups and spoons to become familiar with what a healthy portion looks like.  Bread and Starch  Each serving equals 15 grams of carbohydrates.  · 1 slice bread.   · ¼ bagel.   · ¾ cup  cold cereal (unsweetened).   · ½ cup hot cereal or mashed potatoes.   · 1 small potato (size of a computer mouse).   ·  cup cooked pasta or rice.   · ½ English muffin.   · 1 cup broth-based soup.   · 3 cups of popcorn.   · 4 to 6 whole-wheat crackers.   · ½ cup cooked beans, peas, or corn.   Vegetable  Each serving equals 5 grams of carbohydrates.  · ½ cup cooked vegetables.   · 1 cup raw vegetables.   · ½ cup tomato or vegetable juice.   Fruit  Each serving equals 15 grams of carbohydrates.  · 1 small apple or orange.   · 1¼ cup watermelon or strawberries.   · ½ cup applesauce (no sugar added).   · 2 tbs raisins.   · ½ banana.   · ½ cup canned fruit, packed in water, its own juice, or sweetened with a sugar substitute.   · ½ cup unsweetened fruit juice.   Dairy  Each serving equals 12 to 15 grams of carbohydrates.  · 1 cup fat-free milk.   · 6 oz artificially sweetened yogurt or plain yogurt.   · 1 cup low-fat buttermilk.   · 1 cup soy milk.   · 1 cup almond milk.   Meat/Protein  · 1 large egg.   · 2 to 3 oz meat, poultry, or fish.   · ¼ cup low-fat cottage cheese.   · 1 tbs peanut butter.   · 1 oz low-fat cheese.   · ¼ cup tuna in water.   · ½ cup tofu.   Fat  · 1 tsp oil.   · 1 tsp trans-fat-free margarine.   · 1 tsp butter.   · 1 tsp mayonnaise.   · 2 tbs avocado.   · 1 tbs salad dressing.   · 1 tbs cream cheese.   · 2 tbs sour cream.   SAMPLE 1800 CALORIE DIET PLAN  Breakfast  · ¾ cup unsweetened cereal (1 carb serving).   · 1 cup fat-free milk (1 carb serving).   · 1 slice whole-wheat toast (1 carb serving).   · ½ small banana (1 carb serving).   · 1 scrambled egg.   · 1 tsp trans-fat-free margarine.   Lunch  · Tuna sandwich.   · 2 slices whole-wheat bread (2 carb servings).   · ½ cup canned tuna in water, drained.   · 1 tbs reduced fat mayonnaise.   · 1 stalk celery, chopped.   · 2 slices tomato.   · 1 lettuce leaf.   · 1 cup carrot sticks.   · 24 to 30 seedless grapes (2 carb servings).   · 6 oz light yogurt  (1 carb serving).   Afternoon Snack  · 3 roma cracker squares (1 carb serving).   · Fat-free milk, 1 cup (1 carb serving).   · 1 tbs peanut butter.   Dinner  · 3 oz salmon, broiled with 1 tsp oil.   · 1 cup mashed potatoes (2 carb servings) with 1 tsp trans-fat-free margarine.   · 1 cup fresh or frozen green beans.   · 1 cup steamed asparagus.   · 1 cup fat-free milk (1 carb serving).   Evening Snack  · 3 cups air-popped popcorn (1 carb serving).   · 2 tbs parmesan cheese sprinkled on top.   MEAL PLAN  Use this worksheet to help you make a daily meal plan based on the 1800 calorie diet suggestions. If you are using this plan to help you control your blood glucose, you may interchange carbohydrate-containing foods (dairy, starches, and fruits). Select a variety of fresh foods of varying colors and flavors. The total amount of carbohydrate in your meals or snacks is more important than making sure you include all of the food groups every time you eat. Choose from the following foods to build your day's meals:  · 8 Starches.   · 4 Vegetables.   · 3 Fruits.   · 2 Dairy.   · 6 to 7 oz Meat/Protein.   · Up to 4 Fats.   Your dietician can use this worksheet to help you decide how many servings and which types of foods are right for you.  BREAKFAST  Food Group and Servings / Food Choice  Starch ________________________________________________________  Dairy _________________________________________________________  Fruit _________________________________________________________  Meat/Protein __________________________________________________  Fat ___________________________________________________________  LUNCH  Food Group and Servings / Food Choice  Starch ________________________________________________________  Meat/Protein __________________________________________________  Vegetable _____________________________________________________  Fruit _________________________________________________________  Dairy  _________________________________________________________  Fat ___________________________________________________________  AFTERNOON SNACK  Food Group and Servings / Food Choice  Starch ________________________________________________________  Meat/Protein __________________________________________________  Fruit __________________________________________________________  Dairy _________________________________________________________  DINNER  Food Group and Servings / Food Choice  Starch _________________________________________________________  Meat/Protein ___________________________________________________  Dairy __________________________________________________________  Vegetable ______________________________________________________  Fruit ___________________________________________________________  Fat ____________________________________________________________  EVENING SNACK  Food Group and Servings / Food Choice  Fruit __________________________________________________________  Meat/Protein ___________________________________________________  Dairy __________________________________________________________  Starch _________________________________________________________  DAILY TOTALS  Starch ____________________________  Vegetable _________________________  Fruit _____________________________  Dairy _____________________________  Meat/Protein______________________  Fat _______________________________  Document Released: 07/10/2006 Document Revised: 03/11/2013 Document Reviewed: 11/02/2012  Exit[employer]® Patient Information ©2013 [employer].              Sharif Status: Patient Declined

## 2017-07-27 RX ORDER — OMEPRAZOLE 20 MG/1
20 TABLET, DELAYED RELEASE ORAL DAILY
Qty: 50 TAB | Refills: 0 | Status: SHIPPED | OUTPATIENT
Start: 2017-07-27 | End: 2017-07-31

## 2017-07-27 RX ORDER — ERGOCALCIFEROL 1.25 MG/1
50000 CAPSULE ORAL
Qty: 8 CAP | Refills: 5 | Status: SHIPPED | OUTPATIENT
Start: 2017-07-27 | End: 2018-08-23 | Stop reason: SDUPTHER

## 2017-07-27 ASSESSMENT — ENCOUNTER SYMPTOMS
SPUTUM PRODUCTION: 0
FEVER: 0
HEARTBURN: 1
VOMITING: 0
CARDIOVASCULAR NEGATIVE: 1
TINGLING: 0
TREMORS: 0
NAUSEA: 0
DIZZINESS: 0
EYE REDNESS: 0
HEADACHES: 1
MUSCULOSKELETAL NEGATIVE: 1
PSYCHIATRIC NEGATIVE: 1
CHILLS: 0
COUGH: 0
EYE DISCHARGE: 0

## 2017-07-27 NOTE — PROGRESS NOTES
Established Patient    Christi presents today with the following:    CC:  Headache and Epigastric pain    HPI:     Headache- c/o headache for 9 days.  Her headache is pressure like, bilateral and triggerred by staying in the sun.  Denies premonition, aura, nausea or vomiting.   Took tylenol 200mg twice but fidn't get much relief.   No history of migraine.      Epigastric pain- c/o epigastric burning pain.  Associated with metallic taste in mouth and throat irritation when she lies flat.  Tried OTC Tums without relief.      Pre-diabetes/Morbid Obesity-  Following low carb diet mostly salad and fish.  Hasn't had time to exercise.  She states that she walks a lot at work.  [facility] didn't take her insurance.  Will refer to [facility].     Vitamin D deficiency- compliant with medications.            Patient Active Problem List    Diagnosis Date Noted   • Gastroesophageal reflux disease without esophagitis 07/26/2017   • Tension-type headache 07/26/2017   • Tension-type headache, not intractable 07/26/2017   • Prediabetes 05/25/2016   • Vitamin D deficiency 05/25/2016   • DELIA (obstructive sleep apnea) 05/25/2016   • Morbid obesity (CMS-HCC) 05/25/2016       Current Outpatient Prescriptions   Medication Sig Dispense Refill   • omeprazole (PRILOSEC OTC) 20 MG tablet Take 1 Tab by mouth every day. 50 Tab 0   • vitamin D, Ergocalciferol, (DRISDOL) 42868 UNITS Cap capsule Take 1 Cap by mouth every 7 days. 8 Cap 5     No current facility-administered medications for this visit.       ROS: As per HPI. Additional pertinent symptoms as noted below.      Review of Systems   Constitutional: Negative for fever and chills.   HENT: Negative for nosebleeds.    Eyes: Negative for discharge and redness.   Respiratory: Negative for cough and sputum production.    Cardiovascular: Negative.    Gastrointestinal: Positive for heartburn. Negative for nausea and vomiting.   Genitourinary: Negative.    Musculoskeletal:  "Negative.    Skin: Negative for itching and rash.   Neurological: Positive for headaches. Negative for dizziness, tingling and tremors.   Psychiatric/Behavioral: Negative.        /80 mmHg  Pulse 87  Temp(Src) 36.5 °C (97.7 °F)  Ht 1.441 m (4' 8.73\")  Wt 104.418 kg (230 lb 3.2 oz)  BMI 50.29 kg/m2  SpO2 96%    Physical Exam   Constitutional:  oriented to person, place, and time. No distress.   Eyes: Pupils are equal, round, and reactive to light. No scleral icterus.  Neck: Neck supple.  Cardiovascular: Normal rate, regular rhythm and normal heart sounds.  Exam reveals no gallop and no friction rub.  No murmur heard.  Pulmonary/Chest: Breath sounds normal.   Musculoskeletal:   no edema.   Abdomen:  Soft, BS+, Non tender, non distended  Lymphadenopathy: no cervical adenopathy  Neurological: alert and oriented to person, place, and time.  CN 2-12 grossly intact  Skin: No cyanosis. Nails show no clubbing.      Assessment and Plan    1. Prediabetes  - Last A1C 6.4 in 3/2017  - Life style modification.    - REFERRAL TO NUTRITION SERVICES    2. Morbid obesity, unspecified obesity type (CMS-HCC)  - No change in weight. Wt: 230Lb  - Counseled extensively about life style modifications.   - She is making improvement in terms of diet.   - Would benefit from consistent exercise regimen  - Will follow in 10 wks to reassess weight and encourage lifestyle modifications  - REFERRAL TO NUTRITION SERVICES    3. Vitamin D deficiency  - Compliant. Will refill Vitamin D  - vitamin D, Ergocalciferol, (DRISDOL) 88934 UNITS Cap capsule; Take 1 Cap by mouth every 7 days.  Dispense: 8 Cap; Refill: 5    4. Gastroesophageal reflux disease without esophagitis  - Failed outpatient OTC TUMs  - Will start trial of PPI for 6 wks and Re-assess.    - omeprazole (PRILOSEC OTC) 20 MG tablet; Take 1 Tab by mouth every day.  Dispense: 50 Tab; Refill: 0    5. Tension-type headache, not intractable, unspecified chronicity pattern  - No warning " sign such as CN deficit or N,V.   - Supportive measure  - Avoid triggers such as heat/sun if possible  - Advised to take OTC Tylenol Extra strength or Excedrin for headache        Followup: Return in about 10 weeks (around 10/4/2017).      Signed by: Luzma Thomas M.D.

## 2017-07-28 ENCOUNTER — TELEPHONE (OUTPATIENT)
Dept: INTERNAL MEDICINE | Facility: MEDICAL CENTER | Age: 27
End: 2017-07-28

## 2017-07-31 ENCOUNTER — TELEPHONE (OUTPATIENT)
Dept: INTERNAL MEDICINE | Facility: MEDICAL CENTER | Age: 27
End: 2017-07-31

## 2017-07-31 DIAGNOSIS — K21.9 GASTROESOPHAGEAL REFLUX DISEASE WITHOUT ESOPHAGITIS: ICD-10-CM

## 2017-07-31 NOTE — TELEPHONE ENCOUNTER
Rx switched from omeprazole from nexium due to insurance coverage.  If her insurance doesn't cover Nexium as well,   she may have to pay out of her pocket.

## 2017-10-04 ENCOUNTER — TELEPHONE (OUTPATIENT)
Dept: INTERNAL MEDICINE | Facility: MEDICAL CENTER | Age: 27
End: 2017-10-04

## 2017-10-04 ENCOUNTER — OFFICE VISIT (OUTPATIENT)
Dept: INTERNAL MEDICINE | Facility: MEDICAL CENTER | Age: 27
End: 2017-10-04
Payer: MEDICAID

## 2017-10-04 VITALS
DIASTOLIC BLOOD PRESSURE: 92 MMHG | TEMPERATURE: 98.3 F | OXYGEN SATURATION: 95 % | BODY MASS INDEX: 49.15 KG/M2 | SYSTOLIC BLOOD PRESSURE: 110 MMHG | WEIGHT: 227.8 LBS | HEIGHT: 57 IN | HEART RATE: 77 BPM

## 2017-10-04 DIAGNOSIS — E66.01 MORBID OBESITY (HCC): ICD-10-CM

## 2017-10-04 DIAGNOSIS — K21.9 GASTROESOPHAGEAL REFLUX DISEASE WITHOUT ESOPHAGITIS: ICD-10-CM

## 2017-10-04 DIAGNOSIS — G44.209 TENSION-TYPE HEADACHE, NOT INTRACTABLE, UNSPECIFIED CHRONICITY PATTERN: ICD-10-CM

## 2017-10-04 DIAGNOSIS — R73.03 PREDIABETES: ICD-10-CM

## 2017-10-04 DIAGNOSIS — N94.6 DYSMENORRHEA: ICD-10-CM

## 2017-10-04 DIAGNOSIS — G47.33 OSA (OBSTRUCTIVE SLEEP APNEA): ICD-10-CM

## 2017-10-04 DIAGNOSIS — E55.9 VITAMIN D DEFICIENCY: ICD-10-CM

## 2017-10-04 PROCEDURE — 99214 OFFICE O/P EST MOD 30 MIN: CPT | Mod: GC | Performed by: INTERNAL MEDICINE

## 2017-10-04 NOTE — TELEPHONE ENCOUNTER
----- Message from Grady Dozier sent at 10/4/2017  3:00 PM PDT -----  Regarding: [facility]  Contact: [phone]  Patient want to know if the acid blocker medication is going to be called into [facility] on [address]?

## 2017-10-05 NOTE — PROGRESS NOTES
Established Patient    Christi presents today with the following:    CC: Dysmenorrhea    HPI: 56-year-old female with a past medical history of morbid obesity, GERD, DELIA, vitamin D deficiency, and tension headaches comes to clinic complaining of dysmenorrhea and menometrorrhagia. She reports her last muscle cycle was on 9/22/17 which lasted a total 7 days and was associated with extreme cramping sensation. She reports heavy bleeding and use a total of 27 pads. Before then, she have any periods for the months of July and August. Patient reports that her menstrual cycle has always been regular. Additionally, she complains of clear fishy odor discharge. Patient denies itchiness, burning sensation, or any dysuria. No fever, chills, night sweats. Patient reports having a virgin and thus denies any history of STDs. Patient also complains of GERD which PPIs will be continued. Patient is morbidly obese and at risk for diabetes. A1c ordered and CMP. Patient to follow-up with results in 5 weeks.    Patient Active Problem List    Diagnosis Date Noted   • Gastroesophageal reflux disease without esophagitis 07/26/2017   • Tension-type headache 07/26/2017   • Tension-type headache, not intractable 07/26/2017   • Prediabetes 05/25/2016   • Vitamin D deficiency 05/25/2016   • DELIA (obstructive sleep apnea) 05/25/2016   • Morbid obesity (CMS-[facility]) 05/25/2016       Current Outpatient Prescriptions   Medication Sig Dispense Refill   • vitamin D, Ergocalciferol, (DRISDOL) 31213 UNITS Cap capsule Take 1 Cap by mouth every 7 days. 8 Cap 5   • esomeprazole (NEXIUM) 20 MG capsule Take 1 Cap by mouth every morning before breakfast. 42 Cap 0     No current facility-administered medications for this visit.        ROS: As per HPI. Additional pertinent symptoms as noted below.  Constitutional: no fevers, chills, weight change  Eyes: no blurred vision, discharge, eye pain  ENT: no rhinorrhea, sore throat, neck masses  Cardiovascular: no  "angina, palpitations, PND, orthopnea, edema  Respiratory: no cough, sputum, or dyspnea  GI: no nausea, vomiting, abdominal pain, constipation, or diarrhea  : Dysuria, fishy odor  Musculo-skeletal: no joint or muscle pain  Skin: no rashes or open wounds  Neurological: no headaches, dizziness, motor/sensory loss  Psychological: no anxiety or depression      /92   Pulse 77   Temp 36.8 °C (98.3 °F)   Ht 1.441 m (4' 8.73\")   Wt 103.3 kg (227 lb 12.8 oz)   SpO2 95%   BMI 49.77 kg/m²     Physical Exam   Constitutional:  oriented to person, place, and time. No distress.   Eyes: Pupils are equal, round, and reactive to light. No scleral icterus.  Neck: Neck supple. No thyromegaly present.   Cardiovascular: Normal rate, regular rhythm and normal heart sounds. Exam reveals no gallop and no friction rub.  No murmur heard.  Pulmonary/Chest: Breath sounds normal. Chest wall is not dull to percussion.   Abdominal: Positive bowel sounds, nondistended and nontender  Pelvic: No lacerations, no discharge around the vaginal canal, moderate cervical mucus, no erythema or lesions noted, no fishy odor noted.  Musculoskeletal: no edema.   Lymphadenopathy: no cervical adenopathy  Neurological: alert and oriented to person, place, and time.   Skin: No cyanosis. Nails show no clubbing.      Note: I have reviewed all pertinent labs and diagnostic tests associated with this visit with specific comments listed under the assessment and plan below    Assessment and Plan    1. Dysmenorrhea  - Patient reports intense cramps with menstrual cycle  - LMP on 9/22/17  - Heavy menstrual bleed (27 pads)  - Prolonged menstrual bleed (7 days)   - Irregular cycle  - Skipped 2 months ( no menstruation on July and August)  - Patient is not sexually active (reports being a virgin)  - Transabdominal U/S  - Follow up with results in 5 weeks    2. Vaginal discharge  - Patient complains of clear vaginal discharge  - Reports fishy odor  - No odor " appreciated on speculum exam  - Speculum exam without abnormalities  - Samples were collected and sent for cultures  - Follow up with results in 5 weeks    3. Morbid obesity (CMS-[facility])  - BMI at 49.77  - Encouraged patient to lose weight  - Patient education on proper diet  - Encouraged patient to exercise  - Patient to lose 10 pounds by next visit  - Follow up 5 weeks     4. Gastroesophageal reflux disease without esophagitis  - Patient complains of acid reflux  - Refilled esomeprazole    5. Tension-type headache, not intractable, unspecified chronicity pattern  - Patient reports daily headaches  - Continue with Tylenol exercise strength    6. Prediabetes  - To evaluate A1c for next visit  - Lipid panel for next visit    7. Vitamin D deficiency  - Vitamin D levels for next visit  - Continue with vitamin D supplementation    8. DELIA (obstructive sleep apnea)  - Continue with CPAP      Followup: Return in about 5 weeks (around 11/8/2017).      Signed by: Kiet Torres M.D.

## 2017-10-05 NOTE — TELEPHONE ENCOUNTER
Medications sent to [facility] in [place], received.  Called patient's mother to inform  Patient to be seen in 5 weeks for follow-up

## 2017-10-11 ENCOUNTER — HOSPITAL ENCOUNTER (OUTPATIENT)
Dept: RADIOLOGY | Facility: MEDICAL CENTER | Age: 27
End: 2017-10-11
Attending: STUDENT IN AN ORGANIZED HEALTH CARE EDUCATION/TRAINING PROGRAM
Payer: MEDICAID

## 2017-10-11 DIAGNOSIS — N94.6 DYSMENORRHEA: ICD-10-CM

## 2017-10-11 PROCEDURE — 76856 US EXAM PELVIC COMPLETE: CPT

## 2017-10-12 DIAGNOSIS — N94.6 DYSMENORRHEA: ICD-10-CM

## 2017-11-08 ENCOUNTER — OFFICE VISIT (OUTPATIENT)
Dept: INTERNAL MEDICINE | Facility: MEDICAL CENTER | Age: 27
End: 2017-11-08
Payer: MEDICAID

## 2017-11-08 VITALS
DIASTOLIC BLOOD PRESSURE: 90 MMHG | HEIGHT: 57 IN | BODY MASS INDEX: 48.84 KG/M2 | HEART RATE: 94 BPM | WEIGHT: 226.38 LBS | SYSTOLIC BLOOD PRESSURE: 132 MMHG | TEMPERATURE: 98.1 F | OXYGEN SATURATION: 95 %

## 2017-11-08 DIAGNOSIS — R73.03 PREDIABETES: ICD-10-CM

## 2017-11-08 DIAGNOSIS — G47.33 OSA (OBSTRUCTIVE SLEEP APNEA): ICD-10-CM

## 2017-11-08 DIAGNOSIS — K21.9 GASTROESOPHAGEAL REFLUX DISEASE WITHOUT ESOPHAGITIS: ICD-10-CM

## 2017-11-08 DIAGNOSIS — Z23 NEED FOR IMMUNIZATION AGAINST INFLUENZA: ICD-10-CM

## 2017-11-08 DIAGNOSIS — E55.9 VITAMIN D DEFICIENCY: ICD-10-CM

## 2017-11-08 DIAGNOSIS — E66.01 MORBID OBESITY (HCC): ICD-10-CM

## 2017-11-08 PROCEDURE — 99214 OFFICE O/P EST MOD 30 MIN: CPT | Mod: GC,25 | Performed by: INTERNAL MEDICINE

## 2017-11-08 PROCEDURE — 90471 IMMUNIZATION ADMIN: CPT | Performed by: INTERNAL MEDICINE

## 2017-11-08 PROCEDURE — 90686 IIV4 VACC NO PRSV 0.5 ML IM: CPT | Performed by: INTERNAL MEDICINE

## 2017-11-08 RX ORDER — PANTOPRAZOLE SODIUM 20 MG/1
20 TABLET, DELAYED RELEASE ORAL DAILY
Qty: 30 TAB | Refills: 1 | Status: SHIPPED | OUTPATIENT
Start: 2017-11-08 | End: 2018-06-04

## 2017-11-08 NOTE — NON-PROVIDER
"Christi Mcgarry is a 26 y.o. female here for a non-provider visit for:   FLU    Reason for immunization: Provider Recommended  Immunization records indicate need for vaccine: Yes, confirmed with Epic  Minimum interval has been met for this vaccine: Yes  ABN completed: Not Indicated    Order and dose verified by: Gerardo MONTALVO Dated  08/07/15 was given to patient: Yes  All IAC Questionnaire questions were answered \"No.\"    Patient tolerated injection and no adverse effects were observed or reported: Yes    Pt scheduled for next dose in series: Not Indicated    "

## 2017-11-09 NOTE — PATIENT INSTRUCTIONS
-Referral to pulmonology for CPAP requirement evaluation  -A1c for next visit  -Per diet and exercise, patient to lose 10 pounds by next visit  -Follow up in 5 weeks

## 2018-02-21 ENCOUNTER — OFFICE VISIT (OUTPATIENT)
Dept: INTERNAL MEDICINE | Facility: MEDICAL CENTER | Age: 28
End: 2018-02-21
Payer: MEDICAID

## 2018-02-21 VITALS
HEIGHT: 57 IN | BODY MASS INDEX: 48.54 KG/M2 | OXYGEN SATURATION: 96 % | HEART RATE: 88 BPM | SYSTOLIC BLOOD PRESSURE: 142 MMHG | TEMPERATURE: 97.7 F | DIASTOLIC BLOOD PRESSURE: 70 MMHG | WEIGHT: 225 LBS

## 2018-02-21 DIAGNOSIS — R73.03 PREDIABETES: ICD-10-CM

## 2018-02-21 DIAGNOSIS — R13.10 ODYNOPHAGIA: ICD-10-CM

## 2018-02-21 DIAGNOSIS — K21.9 GASTROESOPHAGEAL REFLUX DISEASE WITHOUT ESOPHAGITIS: ICD-10-CM

## 2018-02-21 PROCEDURE — 99213 OFFICE O/P EST LOW 20 MIN: CPT | Mod: GE | Performed by: INTERNAL MEDICINE

## 2018-02-21 ASSESSMENT — PATIENT HEALTH QUESTIONNAIRE - PHQ9: CLINICAL INTERPRETATION OF PHQ2 SCORE: 0

## 2018-02-22 PROBLEM — G44.209 TENSION-TYPE HEADACHE, NOT INTRACTABLE: Status: RESOLVED | Noted: 2017-07-26 | Resolved: 2018-02-22

## 2018-02-22 NOTE — PROGRESS NOTES
"      Established Patient    Christi presents today with the following:    CC: Odynophagia    HPI: 26-year-old Morbidly obeast female with a past medical history of morbid obesity, prediabetes, GERD, DELIA, and vitamin D deficiency presents with a 5 yr h/o odynophagia to liquids and solids which started after her Tonsilloadenoidectomy on 4/15/13. She reports tenderness in her Lt. Throat and experiences the pain every time eats. She denies h/o of URI, ill contacts, F/C or night swats. No changes in appetite or weight. No chest pain, dyspnea, palpitations, dizziness or LOC.        Patient Active Problem List    Diagnosis Date Noted   • Odynophagia 02/21/2018   • Gastroesophageal reflux disease without esophagitis 07/26/2017   • Tension-type headache, not intractable 07/26/2017   • Prediabetes 05/25/2016   • Vitamin D deficiency 05/25/2016   • DELIA (obstructive sleep apnea) 05/25/2016   • Morbid obesity (CMS-HCC) 05/25/2016       Current Outpatient Prescriptions   Medication Sig Dispense Refill   • vitamin D, Ergocalciferol, (DRISDOL) 72657 UNITS Cap capsule Take 1 Cap by mouth every 7 days. 8 Cap 5   • pantoprazole (PROTONIX) 20 MG tablet Take 1 Tab by mouth every day. 30 Tab 1     No current facility-administered medications for this visit.        ROS: As per HPI. Additional pertinent symptoms as noted below.  Constitutional: no fevers, chills, weight change  Eyes: no blurred vision, discharge, eye pain  ENT: no rhinorrhea, sore throat, neck masses  Cardiovascular: no angina, palpitations, PND, orthopnea, edema  Respiratory: no cough, sputum, or dyspnea  GI: no nausea, vomiting, abdominal pain, constipation, or diarrhea  : no dysuria, hematuria, frequency   Musculo-skeletal: no joint or muscle pain  Skin: no rashes or open wounds  Neurological: no headaches, dizziness, motor/sensory loss  Psychological: no anxiety or depression      /70   Pulse 88   Temp 36.5 °C (97.7 °F)   Ht 1.441 m (4' 8.75\")   Wt 102.1 kg " (225 lb)   SpO2 96%   BMI 49.12 kg/m²     Physical Exam   Constitutional:  oriented to person, place, and time. No distress.   Eyes: Pupils are equal, round, and reactive to light. No scleral icterus.  Neck: Neck supple. No thyromegaly present. Lt. throat TTP   Cardiovascular: Normal rate, regular rhythm and normal heart sounds.  Exam reveals no gallop and no friction rub.  No murmur heard.  Pulmonary/Chest: Breath sounds normal. Chest wall is not dull to percussion.   Musculoskeletal:   no edema.   Lymphadenopathy: no cervical adenopathy  Neurological: alert and oriented to person, place, and time.   Skin: No cyanosis. Nails show no clubbing.      Note: I have reviewed all pertinent labs and diagnostic tests associated with this visit with specific comments listed under the assessment and plan below    Assessment and Plan    1. Odynophagia  - Reports chronic h/o of odynophagia for liquids and solids after Tonsilloadenoidectomy on 4/15/13  - Intact neck passive and active ROM  - Lt neck TTP on PE  - U/S neck to look for soft tissue swelling, futur  - F/U in 5 weeks    2. Gastroesophageal reflux disease without esophagitis  -Pantoprazole not covered by insurance  -Pending prior authorization    3. Prediabetes  - BMI at 49  - Encouraged patient to lose weight  - Patient education on proper diet  - Encouraged patient to exercise  - Follow up 5 weeks     4. Vitamin D deficiency  - Continue vitamin D replacement    5. DELIA (obstructive sleep apnea)  - Family member reports patient's snoring during sleep  - Patient notes fatigue during the day  - Patient has history of CPAP use  - Pending Pulmonology for CPAP indications    Followup: Return in about 5 weeks (around 3/28/2018) for Short.      Signed by: Kiet Torres M.D.

## 2018-02-22 NOTE — PATIENT INSTRUCTIONS
Diabetes y actividad física  (Diabetes and Exercise)  Hacer actividad física con regularidad es muy importante. No se trata solo de perder peso. Tiene muchos otros beneficios, najma por ejemplo:  · Mejorar el estado físico, la flexibilidad y la resistencia.  · Aumenta la densidad ósea.  · Ayuda a controlar el peso.  · Disminuye la grasa corporal.  · Aumenta la fuerza muscular.  · Reduce el estrés y las tensiones.  · Mejora el estado de ana general.  Las personas diabéticas que realizan actividad física tienen beneficios adicionales debido al ejercicio:  · Reduce el apetito.  · El organismo mejora el uso del azúcar (glucosa) de la damaris.  · Ayuda a disminuir o controlar la glucosa en la damaris.  · Disminuye la presión arterial.  · Ayuda a disminuir los lípidos en la damaris (colesterol y triglicéridos).  · El organismo mejora el uso de la insulina porque:  ¨ Aumenta la sensibilidad del organismo a la insulina.  ¨ Reduce las necesidades de insulina del organismo.  · Disminuye el riesgo de enfermedad cardíaca por la actividad física ya que  ¨ disminuye el colesterol y también los triglicéridos.  ¨ Aumenta los niveles de colesterol real (najma las lipoproteínas de paulo densidad [HDL]) en el organismo.  ¨ Disminuye los niveles de glucosa en la damaris.  DIAMOND PLAN DE ACTIVIDAD   Elija karissa actividad que disfrute y establezca objetivos realistas. Para ejercitarse sin riesgos, debe comenzar a practicar cualquier actividad física nueva lentamente y aumentar la intensidad del ejercicio de forma gradual con el tiempo. Diamnod médico o educador en diabetes podrán ayudarlo a crear un plan de actividades que lo beneficie. Las recomendaciones generales incluyen lo siguiente:  · Alentar a los niños para que realicen al menos 60 minutos de actividad física cada día.  · Estirarse y realizar ejercicios de entrenamiento de la fuerza, najma yoga o levantamiento de pesas, por lo menos 2 veces por semana.  · Realizar en total por lo menos 150  minutos de ejercicios de intensidad moderada cada semana, najma caminar a paso ligero o hacer gimnasia acuática.  · Hacer ejercicio físico por lo menos 3 días por semana y no dejar pasar más de 2 días seguidos sin ejercitarse.  · Evitar los períodos largos de inactividad (90 minutos o más tiempo). Cuando deba pasar mucho tiempo sentado, keily pausas frecuentes para caminar o estirarse.  RECOMENDACIONES PARA REALIZAR EJERCICIOS CUANDO SE TIENE DIABETES TIPO 1 O TIPO 2   · Controle la glucosa en la damaris antes de comenzar. Si el nivel de glucosa en la damaris es de más de 240 mg/dl, controle las cetonas en la orina. No keily actividad física si hay cetonas.  · Evite inyectarse insulina en las zonas del cuerpo que ejercitará. Por ejemplo, evite inyectarse insulina en:  ¨ Los brazos, si juega al tenis.  ¨ Las piernas, si corre.  · Lleve un registro de:  ¨ Los alimentos que consume antes y después de hacer el ejercicio.  ¨ Los momentos esperables de picos de acción de la insulina.  ¨ Los niveles de glucosa en la damaris antes y después de hacer ejercicios.  ¨ El tipo y cantidad de actividad física que realiza.  · Revise los registros con hamilton médico. El médico lo ayudará a desarrollar pautas para ajustar la cantidad de alimento y las cantidades de insulina antes y después de hacer ejercicios.  · Si robert insulina o agentes hipoglucemiantes por vía oral, observe si hay signos y síntomas de hipoglucemia. Entre los que se incluyen:  ¨ Mareos.  ¨ Temblores.  ¨ Sudoración.  ¨ Escalofríos.  ¨ Confusión.  · Miya gran cantidad de agua mientras hace ejercicios para evitar la deshidratación o los golpes de calor. Chin la actividad física se pierde agua corporal que se debe reponer.  · Comente con hamilton médico antes de comenzar un programa de actividad física para verificar que sea seguro para usted. Recuerde, cualquier actividad es mejor que ninguna.     Esta información no tiene najma fin reemplazar el consejo del médico. Asegúrese de  hacerle al médico cualquier pregunta que tenga.     Document Released: 01/06/2009 Document Revised: 05/03/2016  Elsevier Interactive Patient Education ©2016 [employer]

## 2018-02-28 ENCOUNTER — TELEPHONE (OUTPATIENT)
Dept: INTERNAL MEDICINE | Facility: MEDICAL CENTER | Age: 28
End: 2018-02-28

## 2018-02-28 ENCOUNTER — HOSPITAL ENCOUNTER (OUTPATIENT)
Dept: RADIOLOGY | Facility: MEDICAL CENTER | Age: 28
End: 2018-02-28
Attending: STUDENT IN AN ORGANIZED HEALTH CARE EDUCATION/TRAINING PROGRAM
Payer: MEDICAID

## 2018-02-28 DIAGNOSIS — R13.10 ODYNOPHAGIA: ICD-10-CM

## 2018-02-28 PROCEDURE — 76536 US EXAM OF HEAD AND NECK: CPT

## 2018-02-28 NOTE — TELEPHONE ENCOUNTER
DOCUMENTATION OF PAR STATUS:    1. Name of Medication & Dose: Pantoprazole 20mg     2. Name of Prescription Coverage Company & phone #: Medicaid FFS    3. Date Prior Auth Submitted: 02/27/18    4. What information was given to obtain insurance decision? clinicals    5. Prior Auth Letter Approved or Denied? cancelled    6. Action Taken: Pharmacy/Patient Notified: No    Decision scanned into media. Patient can  rx

## 2018-03-01 ENCOUNTER — SLEEP CENTER VISIT (OUTPATIENT)
Dept: SLEEP MEDICINE | Facility: MEDICAL CENTER | Age: 28
End: 2018-03-01
Payer: MEDICAID

## 2018-03-01 VITALS
WEIGHT: 226 LBS | OXYGEN SATURATION: 95 % | TEMPERATURE: 97 F | RESPIRATION RATE: 16 BRPM | DIASTOLIC BLOOD PRESSURE: 82 MMHG | HEART RATE: 76 BPM | HEIGHT: 56 IN | BODY MASS INDEX: 50.84 KG/M2 | SYSTOLIC BLOOD PRESSURE: 128 MMHG

## 2018-03-01 DIAGNOSIS — G47.10 HYPERSOMNOLENCE: ICD-10-CM

## 2018-03-01 DIAGNOSIS — G47.30 SLEEP APNEA, UNSPECIFIED TYPE: ICD-10-CM

## 2018-03-01 DIAGNOSIS — R06.83 SNORING: ICD-10-CM

## 2018-03-01 PROCEDURE — 99204 OFFICE O/P NEW MOD 45 MIN: CPT | Performed by: INTERNAL MEDICINE

## 2018-03-01 RX ORDER — ZOLPIDEM TARTRATE 5 MG/1
TABLET ORAL
Qty: 3 TAB | Refills: 0 | Status: SHIPPED | OUTPATIENT
Start: 2018-03-01 | End: 2018-05-01

## 2018-03-01 NOTE — PROGRESS NOTES
CC:  Snoring, nocturnal apnea and excessive daytime somnolence, suspected sleep apnea hypopnea syndrome.    HPI:   Ms. Mcgarry is a 27-year-old woman referred by Dr.Machado Torres to assist in evaluation and management of suspected sleep-disordered breathing.    She apparently was evaluated years ago by Dr. Rick for excessive daytime somnolence but we don't have the results of that evaluation. At this time she has a regular sleep schedule with bedtime between 10 PM and 12 midnight and she falls asleep within about 30 minutes. She wakes perhaps twice on an average night and awakens at about 8 AM feeling tired without regular morning headaches. Family members have told her that she does snore loudly during sleep and seems to have pauses in breathing during sleep as well. She is quite tired during the day and frequent dozes off during quiet moments. She has not yet completed the [place] sleepiness score or sleep diary. She drinks caffeinated beverages sparingly and does not use substances to induce sleep or to maintain wakefulness. She does not have symptoms suggesting narcolepsy, parasomnia or restless leg syndrome.        Patient Active Problem List    Diagnosis Date Noted   • Odynophagia 02/21/2018   • Gastroesophageal reflux disease without esophagitis 07/26/2017   • Prediabetes 05/25/2016   • Vitamin D deficiency 05/25/2016   • DELIA (obstructive sleep apnea) 05/25/2016   • Morbid obesity (CMS-HCC) 05/25/2016       Past Medical History:   Diagnosis Date   • ADHD (attention deficit hyperactivity disorder)    • Chickenpox    • Headache 5/25/2016   • Insomnia 5/25/2016   • Learning disabilities    • Morbid obesity (CMS-HCC) 5/25/2016   • Nocturnal enuresis 5/25/2016   • Obesity    • DELIA (obstructive sleep apnea) 5/25/2016   • Prediabetes 5/25/2016   • Sleep apnea    • UTI (lower urinary tract infection) 5/25/2016       Past Surgical History:   Procedure Laterality Date   • TONSILLECTOMY AND ADENOIDECTOMY  4/15/2013     "Performed by Nadeem Briceno M.D. at SURGERY SAME DAY [facility] ORS       Family History   Problem Relation Age of Onset   • Sleep Apnea Neg Hx        Social History     Social History   • Marital status: Single     Spouse name: N/A   • Number of children: N/A   • Years of education: N/A     Occupational History   • Not on file.     Social History Main Topics   • Smoking status: Never Smoker   • Smokeless tobacco: Never Used   • Alcohol use No   • Drug use: No   • Sexual activity: Not on file     Other Topics Concern   • Not on file     Social History Narrative   • No narrative on file       Current Outpatient Prescriptions   Medication Sig Dispense Refill   • zolpidem (AMBIEN) 5 MG Tab Take 1-2 tablets by mouth every evening as needed for insomnia. Bring to sleep study. 3 Tab 0   • vitamin D, Ergocalciferol, (DRISDOL) 92278 UNITS Cap capsule Take 1 Cap by mouth every 7 days. 8 Cap 5   • pantoprazole (PROTONIX) 20 MG tablet Take 1 Tab by mouth every day. 30 Tab 1     No current facility-administered medications for this visit.     \"CURRENT RX\"      Allergies: Patient has no known allergies.      ROS  Positive for the sleep, GI and endocrine issues reviewed above. She wears corrective lenses but has no diplopia. She has episodic rhinitis, tinnitus and pharyngitis as well as poor dentition. She has some dependent symmetrical ankle edema but no chest pain or palpitations. She denies unusual cough or dyspnea. She has some heartburn without melena or hematochezia. She has some polyuria and arthralgias, back pain and headaches. All other aspects of the CPT review of systems process are negative as outlined in the attached self history form.      Physical Exam:   /82   Pulse 76   Temp 36.1 °C (97 °F)   Resp 16   Ht 1.422 m (4' 8\")   Wt 102.5 kg (226 lb)   SpO2 95%   BMI 50.67 kg/m²    Head and neck examination demonstrates no mucosal lesion, purulent drainage or evident polyps. The pharynx is benign with a " Mallampati III presentation. The neck is supple without thyromegaly. On chest examination there are symmetrical bilateral breath sounds without rales, wheezing or consolidation. On cardiac examination, the apical impulse and heart sounds are normal and the rhythm is regular. There is no murmur, gallop or rub and no jugular venous distention. The abdomen is soft with active bowel sounds and no palpable hepatosplenomegaly, mass, guarding or rebound. The extremities show no clubbing, cyanosis or edema and no signs of deep venous thrombosis. There is no warmth, redness, tenderness or palpable venous cord in the calves. The skin is clear, warm and dry. There is no unusual peripheral lymphadenopathy. Peripheral pulses are palpable in all 4 extremities. On neurologic examination, cranial nerve function is intact, motor tone is symmetrical, and the patient is alert, oriented and responsive.       Problems:  1. Sleep apnea, unspecified type  She presents with snoring, witnessed nocturnal apnea and significant daytime somnolence. She has a crowded posterior pharyngeal airway and a body mass index of 50.7.  The clinical probability of sleep apnea hypopnea syndrome is significant. Accurate diagnosis and effective treatment are required not only to improve levels of daytime alertness but also to reduce the cardiac and neurologic risks associated with untreated sleep apnea. We have discussed diagnostic options including in-laboratory, attended polysomnography and home sleep testing. We have also discussed treatment options including airway pressurization, reconstructive otolaryngologic surgery, dental appliances and weight management.    2. Hypersomnolence  Probably related to the suspected sleep-disordered breathing. She is spending sufficient time in bed and does not seem to have major issues with sleep hygiene.    3. Snoring      Plan:   1. Polysomnogram, possible split night study.    2. Return visit after testing to discuss  results and treatment options.    We appreciate the opportunity to assist in her care.    Return for after sleep study, SHELIA or MD, may need .

## 2018-04-01 ENCOUNTER — SLEEP STUDY (OUTPATIENT)
Dept: SLEEP MEDICINE | Facility: MEDICAL CENTER | Age: 28
End: 2018-04-01
Attending: INTERNAL MEDICINE
Payer: MEDICAID

## 2018-04-01 DIAGNOSIS — G47.10 HYPERSOMNOLENCE: ICD-10-CM

## 2018-04-01 DIAGNOSIS — R06.83 SNORING: ICD-10-CM

## 2018-04-01 DIAGNOSIS — G47.30 SLEEP APNEA, UNSPECIFIED TYPE: ICD-10-CM

## 2018-04-01 PROCEDURE — 95810 POLYSOM 6/> YRS 4/> PARAM: CPT | Performed by: INTERNAL MEDICINE

## 2018-04-04 NOTE — PROCEDURES
Clinical Comments:  The patient underwent a comprehensive polysomnogram using the standard montage for measurement of parameters of sleep, respiratory events, movement abnormalities, heart rate and rhythm. A microphone was used to monitor snoring.        INTERPRETATION:  Testing began at 10:18:37 PM.  The total recording time was 442.9 minutes with a sleep period of 417.0 minutes and the total sleep time was 357.0 minutes with a sleep efficiency of 80.6%.  The sleep latency was 25.9 minutes, and REM latency was 52.0 minutes.  The patient experienced 47 arousals in total, for an arousal index of 7.9     RESPIRATORY: The patient had 9 apneas in total.  Of these, 9 were obstructive apneas, and 0 were central apneas.  This resulted in an apnea index (AI) of 1.5.  The patient had 47 hypopneas, for a hypopnea index of 7.9.  The overall AHI was 9.4, while the AHI during Stage R sleep was 18.5.  AHI while supine was N/A.     OXIMETRY: Oxygen saturation monitoring showed a mean SpO2 of 91.7%, with a minimum oxygen saturation of 64.0%.  Oxygen saturations were less than or = 89% for 24.9 minutes of sleep time.     CARDIAC: The highest heart rate during the recording was 115.0 beats per minute.  The average heart rate during sleep was 84.0 bpm.     LIMB MOVEMENTS: There were a total of 172 PLMs during sleep, of which 7 were PLMs arousals.  This resulted in a PLMS index of 28.9.    Interpretation:    This was an overnight diagnostic polysomnogram with a possible subsequent CPAP titration. However the patient did not meet the split night protocol.    The patient's sleep efficiency was somewhat reduced at 80.6% and she experienced a normal 3 REM periods. The sleep stage durations revealed 60 minutes of wake after sleep onset, 22 minutes of stage I sleep, 264 minutes of stage II sleep, 9.5 minutes of stage III sleep, and 61.5 minutes of REM. The sleep latency was prolonged at 25.9 minutes and REM latency was slightly shortened at  52 minutes.    Mild obstructive sleep apnea hypopnea was found. The patient experienced 56 hypopneas and apneas. The apnea hypopnea index was 9.4, the mean event duration was 14.7 seconds, and the longest event lasted 24.5 seconds. The patient never slept in the supine position so this study may have under estimated the degree of sleep-disordered breathing. The lowest saturation during sleep was 64% and saturations were less than 90% for a 0.3% of the recording.    Recommendation:    If significant signs, symptoms, and or comorbidities warrant, recommend a positive airway pressure titration. Alternative treatments for OSAH include behavioral modification, use of a dental appliance, and nasopharyngeal reconstructive surgery. Behavior modification includes weight loss, eliminating alcohol and sedatives, and avoiding the supine position. If alternative treatments are used, a follow-up diagnostic study is warranted to ensure efficacy.

## 2018-04-05 ENCOUNTER — SLEEP CENTER VISIT (OUTPATIENT)
Dept: SLEEP MEDICINE | Facility: MEDICAL CENTER | Age: 28
End: 2018-04-05
Payer: MEDICAID

## 2018-04-05 VITALS
WEIGHT: 228 LBS | TEMPERATURE: 97.9 F | HEIGHT: 56 IN | SYSTOLIC BLOOD PRESSURE: 124 MMHG | RESPIRATION RATE: 15 BRPM | BODY MASS INDEX: 51.29 KG/M2 | OXYGEN SATURATION: 93 % | HEART RATE: 86 BPM | DIASTOLIC BLOOD PRESSURE: 82 MMHG

## 2018-04-05 DIAGNOSIS — G47.33 OSA (OBSTRUCTIVE SLEEP APNEA): ICD-10-CM

## 2018-04-05 PROCEDURE — 99213 OFFICE O/P EST LOW 20 MIN: CPT | Performed by: FAMILY MEDICINE

## 2018-04-05 NOTE — PROGRESS NOTES
[facility] Follow Up Note     Date: 4/5/2018 / Time: 1:20 PM    Patient ID:   Name:             Christi Mcgarry   YOB: 1990  Age:                 27 y.o.  female   MRN:               [id number]      Thank you for requesting a sleep medicine consultation on Christi Mcgarry at the sleep center. She presents today with the chief complaints of DELIA and PSG follow up.     HISTORY OF PRESENT ILLNESS:       Pt is currently not on PAP thearpy. She goes to sleep around 10 pm and wakes up around 8 am. She is getting about 8 hrs of sleep on a good night. The symptoms of excessive daytime, continues . The patient experienced 56 hypopneas and apneas. The apnea hypopnea index was 9.4, the mean event duration was 14.7 seconds, and the longest event lasted 24.5 seconds. The patient never slept in the supine Position so this study may have under estimated the degreeofsleep-disordered breathing. The lowest saturation during sleep was 64% and saturations were less than 90% for a 0.3%          REVIEW OF SYSTEMS:       Constitutional: Denies fevers, Denies weight changes  Eyes: Denies changes in vision, no eye pain  Ears/Nose/Throat/Mouth: Denies nasal congestion or sore throat   Cardiovascular: Denies chest pain or palpitations   Respiratory: Denies shortness of breath , Denies cough  Gastrointestinal/Hepatic: Denies abdominal pain, nausea, vomiting, diarrhea, constipation or GI bleeding   Genitourinary: Denies bladder dysfunction, dysuria or frequency  Musculoskeletal/Rheum: Denies  joint pain and swelling   Skin/Breast: Denies rash,   Neurological: Denies headache, confusion, memory loss or focal weakness/parasthesias  Psychiatric: denies mood disorder     Comprehensive review of systems form is reviewed with the patient and is attached in the EMR.     PMH:  has a past medical history of ADHD (attention deficit hyperactivity disorder); Chickenpox; Headache (5/25/2016); Insomnia (5/25/2016);  "Learning disabilities; Morbid obesity (CMS-HCC) (5/25/2016); Nocturnal enuresis (5/25/2016); Obesity; DELIA (obstructive sleep apnea) (5/25/2016); Prediabetes (5/25/2016); Sleep apnea; and UTI (lower urinary tract infection) (5/25/2016). She also has no past medical history of Diabetes (CMS-HCC).  MEDS:   Current Outpatient Prescriptions:   •  vitamin D, Ergocalciferol, (DRISDOL) 08800 UNITS Cap capsule, Take 1 Cap by mouth every 7 days., Disp: 8 Cap, Rfl: 5  •  zolpidem (AMBIEN) 5 MG Tab, Take 1-2 tablets by mouth every evening as needed for insomnia. Bring to sleep study., Disp: 3 Tab, Rfl: 0  •  pantoprazole (PROTONIX) 20 MG tablet, Take 1 Tab by mouth every day., Disp: 30 Tab, Rfl: 1  ALLERGIES: No Known Allergies  SURGHX:   Past Surgical History:   Procedure Laterality Date   • TONSILLECTOMY AND ADENOIDECTOMY  4/15/2013    Performed by Nadeem Briceno M.D. at SURGERY SAME DAY [facility] ORS     SOCHX:  reports that she has never smoked. She has never used smokeless tobacco. She reports that she does not drink alcohol or use drugs..  FH:   Family History   Problem Relation Age of Onset   • Sleep Apnea Neg Hx          Physical Exam:  Vitals/ General Appearance:   Weight/BMI: Body mass index is 51.12 kg/m².  Blood pressure 124/82, pulse 86, temperature 36.6 °C (97.9 °F), resp. rate 15, height 1.422 m (4' 8\"), weight 103.4 kg (228 lb), SpO2 93 %.  Vitals:    04/05/18 1308   BP: 124/82   Pulse: 86   Resp: 15   Temp: 36.6 °C (97.9 °F)   SpO2: 93%   Weight: 103.4 kg (228 lb)   Height: 1.422 m (4' 8\")       Pt. is alert and oriented to time, place and person. Cooperative and in no apparent distress.       1. Head: Atraumatic, normocephalic.   2. Ears: Normal tympanic membrane and no discharge  3. Nose: No inferior turbinate hypertophy, no septal deviation, no polyp.   4. Throat: Oropharynx appears crowded in that the palate is  overhanging (Malam Theresa scale 4. Tonsils are  enlarged, uvula is large, pharynx not inflamed. " Tongue is enlarged.   5. Neck: Supple. No thyromegaly  6. Chest: Trachea central, no spine deformity   7. Lungs auscultation: B/L good air entry, vesicular breath sounds, no adventitious sounds  8. Heart auscultation: 1st and 2nd heart sounds normal, regular rhythm. No appreciable murmur.  9. Abdomen: Soft, non tender, no organomegaly. Bowel sounds present  10. Extremities: no clubbing, no pedal edema.  11. Skin: No rash  12. NEUROLOGICAL EXAMINATION: On neurological exam, the patient was alert and oriented x3. speech was clear and fluent without dysarthria.      INVESTIGATIONS:           ASSESSMENT AND PLAN     1.Obstructive Sleep Apnea (DELIA).The symptoms of excessive daytime continues.      The pathophysiology of DELIA and the increased risk of cardiovascular morbidity from untreated DELIA is discussed in detail with the patient.      She is urged to avoid supine sleep, weight gain and alcoholic beverages since all of these can worsen DELIA. She is cautioned against drowsy driving. If She feels sleepy while driving, She must pull over for a break/nap, rather than persist on the road, in the interest of She own safety and that of others on the road.   Plan   - Auto CPAP vs overnight CPAP titration. After informed discussion Auto CPAP 5-15 cm is ordered today    - F/u in 6 weeks to assess the efficiacy of recommended pressure    - SS was reviewed and discussed with the pt   - compliance was reinforced     2. . Regarding treatment of other past medical problems and general health maintenance,  She is urged to follow up with PCP.

## 2018-04-30 ENCOUNTER — OFFICE VISIT (OUTPATIENT)
Dept: INTERNAL MEDICINE | Facility: MEDICAL CENTER | Age: 28
End: 2018-04-30
Payer: MEDICAID

## 2018-04-30 VITALS
SYSTOLIC BLOOD PRESSURE: 110 MMHG | BODY MASS INDEX: 49.32 KG/M2 | DIASTOLIC BLOOD PRESSURE: 80 MMHG | WEIGHT: 228.6 LBS | HEIGHT: 57 IN | OXYGEN SATURATION: 94 % | TEMPERATURE: 98.1 F | HEART RATE: 74 BPM

## 2018-04-30 DIAGNOSIS — R73.03 PREDIABETES: ICD-10-CM

## 2018-04-30 DIAGNOSIS — K21.9 GASTROESOPHAGEAL REFLUX DISEASE WITHOUT ESOPHAGITIS: ICD-10-CM

## 2018-04-30 DIAGNOSIS — G47.33 OSA (OBSTRUCTIVE SLEEP APNEA): ICD-10-CM

## 2018-04-30 DIAGNOSIS — E66.01 MORBID OBESITY (HCC): ICD-10-CM

## 2018-04-30 DIAGNOSIS — E55.9 VITAMIN D DEFICIENCY: ICD-10-CM

## 2018-04-30 PROCEDURE — 99214 OFFICE O/P EST MOD 30 MIN: CPT | Mod: GC | Performed by: INTERNAL MEDICINE

## 2018-04-30 NOTE — PROGRESS NOTES
Established Patient    Christi presents today with the following:    CC: Sleep study follow up    HPI: 27-year-old Morbidly obeast female with a past medical history of morbid obesity, prediabetes, GERD, DELIA on CPAP followed by pulmonology, and vitamin D deficiency comes to clinic for routine follow up. She reports increased exertion and maintaining a healthy diet but reports no change in weight. She had a sleep study on 4/1/18 which showed Mild obstructive sleep apnea for which she was started on CPAP and is to follow up with Sleep medicine within 6 months. She was w/u for odynophagia for which neck U/S was neg and pt reports symptom resolution. She denies h/o of URI, ill contacts, F/C or night swats. No changes in appetite or weight. No chest pain, dyspnea, palpitations, dizziness or LOC.       Patient Active Problem List    Diagnosis Date Noted   • Odynophagia 02/21/2018   • Gastroesophageal reflux disease without esophagitis 07/26/2017   • Prediabetes 05/25/2016   • Vitamin D deficiency 05/25/2016   • DELIA (obstructive sleep apnea) 05/25/2016   • Morbid obesity (HCC) 05/25/2016       Current Outpatient Prescriptions   Medication Sig Dispense Refill   • vitamin D, Ergocalciferol, (DRISDOL) 90851 UNITS Cap capsule Take 1 Cap by mouth every 7 days. 8 Cap 5   • zolpidem (AMBIEN) 5 MG Tab Take 1-2 tablets by mouth every evening as needed for insomnia. Bring to sleep study. 3 Tab 0   • pantoprazole (PROTONIX) 20 MG tablet Take 1 Tab by mouth every day. 30 Tab 1     No current facility-administered medications for this visit.        ROS: As per HPI. Additional pertinent symptoms as noted below.  Constitutional: no fevers, chills, weight change  Eyes: no blurred vision, discharge, eye pain  ENT: no rhinorrhea, sore throat, neck masses  Cardiovascular: no angina, palpitations, PND, orthopnea, edema  Respiratory: no cough, sputum, or dyspnea  GI: no nausea, vomiting, abdominal pain, constipation, or diarrhea  : no  "dysuria, hematuria, frequency   Musculo-skeletal: no joint or muscle pain  Skin: no rashes or open wounds  Neurological: no headaches, dizziness, motor/sensory loss  Psychological: no anxiety or depression      /80   Pulse 74   Temp 36.7 °C (98.1 °F)   Ht 1.441 m (4' 8.75\")   Wt 103.7 kg (228 lb 9.6 oz)   SpO2 94%   BMI 49.91 kg/m²     Physical Exam   Constitutional:  oriented to person, place, and time. No distress.   Eyes: Pupils are equal, round, and reactive to light. No scleral icterus.  Neck: Neck supple. No thyromegaly present.   Cardiovascular: Normal rate, regular rhythm and normal heart sounds.  Exam reveals no gallop and no friction rub.  No murmur heard.  Pulmonary/Chest: Breath sounds normal. Chest wall is not dull to percussion.   Musculoskeletal:   no edema.   Lymphadenopathy: no cervical adenopathy  Neurological: alert and oriented to person, place, and time.   Skin: No cyanosis. Nails show no clubbing.      Note: I have reviewed all pertinent labs and diagnostic tests associated with this visit with specific comments listed under the assessment and plan below    Assessment and Plan    1. DELIA (obstructive sleep apnea)   - Sleep study showed mild obstructive sleep apnea  - Was started on CPAP  - Follow up with Sleep medicine within 6 months for CPAP seating readjustments     2. Morbid obesity (HCC)  - BMI at 49  - Encouraged patient to lose weight  - Patient education on proper diet  - Encouraged patient to exercise  - Patient to lose 10 pounds by next visit  - Follow up 5 weeks     3. Prediabetes  - BMI at 49  - Encouraged patient to lose weight  - Patient education on proper diet  - Encouraged patient to exercise  - Repeat A1C  - Follow up 5 weeks     4. Vitamin D deficiency  - Continue vitamin D replacement    5. Gastroesophageal reflux disease without esophagitis  - Pantoprazole not covered by insurance  - Pending prior authorization      Followup: Return in about 5 weeks (around " 6/4/2018) for Long.    Signed by: Kiet Torres M.D.

## 2018-05-17 DIAGNOSIS — E66.01 MORBID OBESITY (HCC): ICD-10-CM

## 2018-06-04 ENCOUNTER — OFFICE VISIT (OUTPATIENT)
Dept: INTERNAL MEDICINE | Facility: MEDICAL CENTER | Age: 28
End: 2018-06-04
Payer: MEDICAID

## 2018-06-04 VITALS
BODY MASS INDEX: 49.96 KG/M2 | TEMPERATURE: 98.4 F | HEART RATE: 84 BPM | OXYGEN SATURATION: 95 % | WEIGHT: 231.6 LBS | DIASTOLIC BLOOD PRESSURE: 60 MMHG | SYSTOLIC BLOOD PRESSURE: 120 MMHG | HEIGHT: 57 IN

## 2018-06-04 DIAGNOSIS — K21.9 GASTROESOPHAGEAL REFLUX DISEASE WITHOUT ESOPHAGITIS: ICD-10-CM

## 2018-06-04 DIAGNOSIS — E66.01 MORBID OBESITY (HCC): ICD-10-CM

## 2018-06-04 DIAGNOSIS — E55.9 VITAMIN D DEFICIENCY: ICD-10-CM

## 2018-06-04 DIAGNOSIS — G47.33 OSA (OBSTRUCTIVE SLEEP APNEA): ICD-10-CM

## 2018-06-04 PROCEDURE — 99214 OFFICE O/P EST MOD 30 MIN: CPT | Mod: GC | Performed by: INTERNAL MEDICINE

## 2018-06-04 RX ORDER — PANTOPRAZOLE SODIUM 20 MG/1
20 TABLET, DELAYED RELEASE ORAL DAILY
Qty: 30 TAB | Refills: 0 | Status: SHIPPED | OUTPATIENT
Start: 2018-06-04 | End: 2018-07-08 | Stop reason: SDUPTHER

## 2018-06-04 RX ORDER — OMEPRAZOLE 20 MG/1
20 CAPSULE, DELAYED RELEASE ORAL DAILY
Qty: 30 CAP | Refills: 0 | Status: SHIPPED | OUTPATIENT
Start: 2018-06-04 | End: 2018-06-04

## 2018-06-04 NOTE — PROGRESS NOTES
"      Established Patient    Christi presents today with the following:    CC: Weigh follow up    HPI: 27-year-old Morbidly obeast female with a past medical history of morbid obesity, prediabetes, GERD, DELIA on CPAP followed by pulmonology (Next f/u on 6/19/18), and vitamin D deficiency comes to clinic for routine follow up. Reports starting sumo classes and maintaining diet. Patient BMI increasing currently at 50. She reports 1 week history of persistent dry cough. No fever, chills or night sweats. No rhinorrhea or post nasal drip. No chest pain, dyspnea, palpitations, dizziness or LOC.      Patient Active Problem List    Diagnosis Date Noted   • Odynophagia 02/21/2018   • Gastroesophageal reflux disease without esophagitis 07/26/2017   • Prediabetes 05/25/2016   • Vitamin D deficiency 05/25/2016   • DELIA (obstructive sleep apnea) 05/25/2016   • Morbid obesity (HCC) 05/25/2016       Current Outpatient Prescriptions   Medication Sig Dispense Refill   • pantoprazole (PROTONIX) 20 MG tablet Take 1 Tab by mouth every day. 30 Tab 0   • vitamin D, Ergocalciferol, (DRISDOL) 09533 UNITS Cap capsule Take 1 Cap by mouth every 7 days. 8 Cap 5     No current facility-administered medications for this visit.        ROS: As per HPI. Additional pertinent symptoms as noted below.  Constitutional: no fevers, chills, weight change  Eyes: no blurred vision, discharge, eye pain  ENT: no rhinorrhea, sore throat, neck masses  Cardiovascular: no angina, palpitations, PND, orthopnea, edema  Respiratory: no cough, sputum, or dyspnea  GI: no nausea, vomiting, abdominal pain, constipation, or diarrhea  : no dysuria, hematuria, frequency   Musculo-skeletal: no joint or muscle pain  Skin: no rashes or open wounds  Neurological: no headaches, dizziness, motor/sensory loss  Psychological: no anxiety or depression      /89   Pulse 84   Temp 36.9 °C (98.4 °F)   Ht 1.441 m (4' 8.75\")   Wt 105.1 kg (231 lb 9.6 oz)   SpO2 95%   BMI 50.56 " kg/m²     Physical Exam   Constitutional:  oriented to person, place, and time. No distress.   Eyes: Pupils are equal, round, and reactive to light. No scleral icterus.  Neck: Neck supple. No thyromegaly present. No LAD   Cardiovascular: Normal rate, regular rhythm and normal heart sounds.  Exam reveals no gallop and no friction rub.  No murmur heard.  Pulmonary/Chest: Breath sounds normal. Chest wall is not dull to percussion.   Musculoskeletal:   no edema.   Lymphadenopathy: no cervical adenopathy  Neurological: alert and oriented to person, place, and time.   Skin: No cyanosis. Nails show no clubbing.    Note: I have reviewed all pertinent labs and diagnostic tests associated with this visit with specific comments listed under the assessment and plan below    Assessment and Plan    1. DELIA (obstructive sleep apnea)  - Sleep study showed mild obstructive sleep apnea  - Was started on CPAP  - Follow up with Sleep medicine on 6/19/18  - Follow up 3 months    2. Vitamin D deficiency  - Continue vitamin D supplementation    3. Morbid obesity (HCC)  - BMI at 50  - Encouraged patient to lose weight  - Patient education on proper diet  - Encouraged patient to exercise  - Repeat A1C at 6.1  - Consulted extensively on the repercussions of obesity on possible diabetes and hypertension  - Follow up 3 months    4. Gastroesophageal reflux disease without esophagitis  - Start Pantoprazole      Followup: Return in about 3 months (around 9/4/2018) for Long.      Signed by: Kiet Torres M.D.

## 2018-09-18 ENCOUNTER — OFFICE VISIT (OUTPATIENT)
Dept: INTERNAL MEDICINE | Facility: MEDICAL CENTER | Age: 28
End: 2018-09-18
Payer: MEDICAID

## 2018-09-18 VITALS
HEART RATE: 75 BPM | SYSTOLIC BLOOD PRESSURE: 110 MMHG | DIASTOLIC BLOOD PRESSURE: 80 MMHG | HEIGHT: 57 IN | OXYGEN SATURATION: 96 % | BODY MASS INDEX: 49.49 KG/M2 | TEMPERATURE: 97.3 F | WEIGHT: 229.4 LBS

## 2018-09-18 DIAGNOSIS — R73.03 PREDIABETES: ICD-10-CM

## 2018-09-18 DIAGNOSIS — G47.33 OSA (OBSTRUCTIVE SLEEP APNEA): ICD-10-CM

## 2018-09-18 DIAGNOSIS — Z23 NEED FOR INFLUENZA VACCINATION: ICD-10-CM

## 2018-09-18 DIAGNOSIS — E66.01 MORBID OBESITY (HCC): ICD-10-CM

## 2018-09-18 DIAGNOSIS — E55.9 VITAMIN D DEFICIENCY: ICD-10-CM

## 2018-09-18 PROCEDURE — 90471 IMMUNIZATION ADMIN: CPT | Performed by: INTERNAL MEDICINE

## 2018-09-18 PROCEDURE — 99214 OFFICE O/P EST MOD 30 MIN: CPT | Mod: GC,25 | Performed by: INTERNAL MEDICINE

## 2018-09-18 PROCEDURE — 90686 IIV4 VACC NO PRSV 0.5 ML IM: CPT | Performed by: INTERNAL MEDICINE

## 2018-09-18 RX ORDER — PANTOPRAZOLE SODIUM 20 MG/1
40 TABLET, DELAYED RELEASE ORAL DAILY
Qty: 90 TAB | Refills: 4 | Status: SHIPPED | OUTPATIENT
Start: 2018-09-18 | End: 2021-10-18 | Stop reason: CLARIF

## 2018-09-18 NOTE — PROGRESS NOTES
"      Established Patient    Christi presents today with the following:    CC: Morbid obesity    HPI: 27-year-old Morbidly obeast female with a past medical history of morbid obesity, prediabetes, GERD, DELIA on CPAP followed by pulmonology (Next f/u on 11/18/18), and vitamin D deficiency comes to clinic for routine follow up. Patient BMI still at 50. No fever, chills or night sweats. No rhinorrhea or post nasal drip. No chest pain, dyspnea, palpitations, dizziness or LOC.      Patient Active Problem List    Diagnosis Date Noted   • Odynophagia 02/21/2018   • Gastroesophageal reflux disease without esophagitis 07/26/2017   • Prediabetes 05/25/2016   • Vitamin D deficiency 05/25/2016   • DELIA (obstructive sleep apnea) 05/25/2016   • Morbid obesity (HCC) 05/25/2016       Current Outpatient Prescriptions   Medication Sig Dispense Refill   • pantoprazole (PROTONIX) 20 MG tablet Take 2 Tabs by mouth every day. 90 Tab 4   • vitamin D, Ergocalciferol, (DRISDOL) 20392 units Cap capsule TAKE 1 CAP BY MOUTH EVERY 7 DAYS. 12 Cap 0     No current facility-administered medications for this visit.        ROS: As per HPI. Additional pertinent symptoms as noted below.  Constitutional: no fevers, chills, weight change  Eyes: no blurred vision, discharge, eye pain  ENT: no rhinorrhea, sore throat, neck masses  Cardiovascular: no angina, palpitations, PND, orthopnea, edema  Respiratory: no cough, sputum, or dyspnea  GI: no nausea, vomiting, abdominal pain, constipation, or diarrhea  : no dysuria, hematuria, frequency   Musculo-skeletal: no joint or muscle pain  Skin: no rashes or open wounds  Neurological: no headaches, dizziness, motor/sensory loss  Psychological: no anxiety or depression      /80   Pulse 75   Temp 36.3 °C (97.3 °F)   Ht 1.441 m (4' 8.75\")   Wt 104.1 kg (229 lb 6.4 oz)   SpO2 96%   BMI 50.08 kg/m²     Physical Exam   Constitutional:  oriented to person, place, and time. No distress.   Eyes: Pupils are " equal, round, and reactive to light. No scleral icterus.  Neck: Neck supple. No thyromegaly present.   Cardiovascular: Normal rate, regular rhythm and normal heart sounds.  Exam reveals no gallop and no friction rub.  No murmur heard.  Pulmonary/Chest: Breath sounds normal. Chest wall is not dull to percussion.   Musculoskeletal:   no edema.   Lymphadenopathy: no cervical adenopathy  Neurological: alert and oriented to person, place, and time.   Skin: No cyanosis. Nails show no clubbing.      Note: I have reviewed all pertinent labs and diagnostic tests associated with this visit with specific comments listed under the assessment and plan below    Assessment and Plan    1. Morbid obesity (HCC)  -BMI still at 50  -Patient reports nonadherence to diet or exercise  -Discuss bariatric surgery as an alternative  -Patient and mother would like to discuss with family members before pursuing  -We will discuss next time we meet  -Follow-up in 5 weeks    2. Vitamin D deficiency  -Repeat vitamin D levels  -Follow-up in 5 weeks    3. Prediabetes  -Repeat CMP and CBC  -Follow-up in 5 weeks    4.  Need for influenza vaccination  -Patient received flu shot    5. DELIA (obstructive sleep apnea)  - Sleep study showed mild obstructive sleep apnea  - Was started on CPAP  - Follow up with Sleep medicine on 11/11/18  - Follow-up in 5 weeks      Followup: Return in about 5 weeks (around 10/23/2018) for Long.      Signed by: Kiet Torres M.D.

## 2018-09-18 NOTE — PATIENT INSTRUCTIONS
Información sobre cirugía bariátrica  (Bariatric Surgery Information)  La cirugía bariátrica, también llamada cirugía para bajar de peso, es un procedimiento que ayuda a adelgazar. Usted puede considerar la cirugía bariátrica, o el médico puede recomendársela, en los siguientes casos:  · Tiene obesidad grave y no dotson podido perder peso con la dieta y el ejercicio.  · Tiene problemas de ana relacionados con la obesidad, por ejemplo:  ¨ Diabetes tipo 2.  ¨ Cardiopatía.  ¨ Enfermedad pulmonar.  ¿DE QUÉ MANERA ME AYUDARÁ A PERDER PESO LA CIRUGÍA BARIÁTRICA?  La cirugía bariátrica ayuda a perder peso al disminuir la cantidad de alimentos que el cuerpo absorbe. Para esto, se luisa karissa parte del estómago, a fin de hacerlo más pequeño. [place] restringe la cantidad de alimentos que el estómago puede retener. La cirugía bariátrica también puede modificar el proceso digestivo normal, para que los alimentos eviten las partes del cuerpo que absorben calorías y nutrientes.  Si decide hacerse karissa cirugía bariátrica, es importante que después mantenga karissa dieta saludable y que keily ejercicio con regularidad.  ¿CUÁLES SON LOS DIFERENTES TIPOS DE CIRUGÍA BARIÁTRICA?  Hay dos clases de cirugía bariátrica:  · Las cirugías restrictivas reducen el tamaño del estómago, jamilah no modifican el proceso digestivo. Cuanto lilo sea el tamaño del estómago, menos alimentos se pueden consumir. Hay diferentes tipos de cirugías restrictivas.  · Las cirugías malabsortivas reducen el tamaño del estómago y modifican el proceso digestivo para que el cuerpo procese menos calorías y nutrientes. Nancy tipo de cirugía bariátrica es el más frecuente. Hay diferentes clases de cirugías malabsortivas.  ¿CUÁLES SON LOS TIPOS DE CIRUGÍAS RESTRICTIVAS?  Velarde gástrica ajustable   En nancy procedimiento, se coloca karissa velarde inflable alrededor del estómago, cerca del extremo superior, que reduce mucho el tamaño del tracto por donde pasan los alimentos hacia el richie  del estómago. La velarde puede ajustarse para que esté más ceñida o más floja. Para esto, se llena con solución salina. El cirujano puede ajustar la velarde según cómo se sienta el paciente y cuánto peso esté perdiendo. La velarde puede extraerse en el futuro.  Gastroplastia vertical con velarde   En nancy procedimiento, se colocan grapas para separar al estómago en dos partes, karissa bolsa superior pequeña y karissa bolsa inferior más familia. [place] disminuye la cantidad de alimentos que se pueden consumir.  Gastrectomía en manga   En nancy procedimiento, el tamaño del estómago se reduce ya que se elimina karissa gran parte de él mediante karissa cirugía. Cuando el estómago es más pequeño, siente saciedad más rápidamente y se consumen menos alimentos.  ¿CUÁLES SON LOS TIPOS DE CIRUGÍAS MALABSORTIVAS?  Derivación gástrica en Y de [place]   Esta cirugía es la que se realiza con mayor frecuencia para perder peso. En nancy procedimiento, se hace karissa bolsa gástrica pequeña en la parte superior del estómago, que luego se conecta directamente con la parte del medio del intestino landry. Cuanto más abajo se keily la nueva conexión con el intestino landry, menos calorías y nutrientes se absorberán.  Derivación biliopancreática con desvío duodenal   En nancy procedimiento, que consta de diversos pasos, se elimina karissa gran parte del estómago, lo que reduce hamilton tamaño. Luego, el estómago se conecta con la parte inferior del intestino landry. Al igual que en la derivación gástrica en Y de [place], cuanto más abajo se keily la conexión con el intestino landry, menos calorías y nutrientes se absorberán.  ¿CUÁLES SON LOS RIESGOS DE LA CIRUGÍA BARIÁTRICA?  Kelyl sucede en todos los procedimientos quirúrgicos, cada cirugía bariátrica tiene jerome propios riesgos. Estos riesgos también dependen de la edad, del estado de ana general y de cualquier otra afección posible. Al keily karissa decisión sobre la cirugía bariátrica, es muy importante que keily lo siguiente:  · Hable  con el médico y elija la cirugía más conveniente para usted.  · Consulte al médico sobre los riesgos específicos de la cirugía que elija.  PARA OBTENER MÁS INFORMACIÓN  · Sociedad Estadounidense de Cirugía Metabólica y Bariátrica (American Society for Metabolic & Bariatric Surgery): www.asmbs.org  · Red de información para el control del peso (Weight-control Information Network, WIN): win.niddk.nih.gov  Esta información no tiene najma fin reemplazar el consejo del médico. Asegúrese de hacerle al médico cualquier pregunta que tenga.  Document Released: 12/18/2006 Document Revised: 01/08/2016 Document Reviewed: 06/18/2014  Elsevier Interactive Patient Education © 2017 Elsevier Inc.

## 2018-09-18 NOTE — NON-PROVIDER
"Christi Mcgarry is a 27 y.o. female here for a non-provider visit for:   FLU    Reason for immunization: Annual Flu Vaccine  Immunization records indicate need for vaccine: Yes, confirmed with [place] [facility]  Minimum interval has been met for this vaccine: Yes  ABN completed: Not Indicated    Order and dose verified by: RIKI  VIS Dated  08/07/2015 was given to patient: Yes  All IAC Questionnaire questions were answered \"No.\"    Patient tolerated injection and no adverse effects were observed or reported: Yes    Pt scheduled for next dose in series: Not Indicated    "

## 2018-10-16 DIAGNOSIS — E55.9 VITAMIN D DEFICIENCY: ICD-10-CM

## 2018-10-16 DIAGNOSIS — E66.01 MORBID OBESITY (HCC): ICD-10-CM

## 2018-10-16 DIAGNOSIS — R73.03 PREDIABETES: ICD-10-CM

## 2018-10-23 ENCOUNTER — OFFICE VISIT (OUTPATIENT)
Dept: INTERNAL MEDICINE | Facility: MEDICAL CENTER | Age: 28
End: 2018-10-23
Payer: MEDICAID

## 2018-10-23 VITALS
WEIGHT: 229.2 LBS | HEART RATE: 65 BPM | DIASTOLIC BLOOD PRESSURE: 50 MMHG | HEIGHT: 57 IN | OXYGEN SATURATION: 96 % | BODY MASS INDEX: 49.45 KG/M2 | TEMPERATURE: 98.3 F | SYSTOLIC BLOOD PRESSURE: 110 MMHG

## 2018-10-23 DIAGNOSIS — R73.03 PREDIABETES: ICD-10-CM

## 2018-10-23 DIAGNOSIS — G47.33 OSA (OBSTRUCTIVE SLEEP APNEA): ICD-10-CM

## 2018-10-23 DIAGNOSIS — E66.01 MORBID OBESITY (HCC): ICD-10-CM

## 2018-10-23 DIAGNOSIS — K21.9 GASTROESOPHAGEAL REFLUX DISEASE WITHOUT ESOPHAGITIS: ICD-10-CM

## 2018-10-23 DIAGNOSIS — E55.9 VITAMIN D DEFICIENCY: ICD-10-CM

## 2018-10-23 LAB
HBA1C MFR BLD: 6.2 % (ref ?–5.8)
INT CON NEG: NORMAL
INT CON POS: NORMAL

## 2018-10-23 PROCEDURE — 83036 HEMOGLOBIN GLYCOSYLATED A1C: CPT | Mod: GC | Performed by: INTERNAL MEDICINE

## 2018-10-23 PROCEDURE — 99214 OFFICE O/P EST MOD 30 MIN: CPT | Mod: GC | Performed by: INTERNAL MEDICINE

## 2018-10-23 RX ORDER — INFLUENZA A VIRUS A/BRISBANE/02/2018 IVR-190 (H1N1) ANTIGEN (FORMALDEHYDE INACTIVATED), INFLUENZA A VIRUS A/KANSAS/14/2017 X-327 (H3N2) ANTIGEN (FORMALDEHYDE INACTIVATED), INFLUENZA B VIRUS B/PHUKET/3073/2013 ANTIGEN (FORMALDEHYDE INACTIVATED), AND INFLUENZA B VIRUS B/MARYLAND/15/2016 BX-69A ANTIGEN (FORMALDEHYDE INACTIVATED) 15; 15; 15; 15 UG/.5ML; UG/.5ML; UG/.5ML; UG/.5ML
INJECTION, SUSPENSION INTRAMUSCULAR
COMMUNITY
Start: 2018-09-18 | End: 2018-10-23

## 2018-10-23 NOTE — PROGRESS NOTES
Established Patient    Christi presents today with the following:    CC: Morbid obesity    HPI: 27-year-old Morbidly obeast female with a past medical history of morbid obesity, prediabetes, GERD, DELIA on CPAP followed by pulmonology, and vitamin D deficiency on supplementation comes to clinic for routine follow up. Patient BMI still in 50s. Patient with mother and  today who would like to see a nutritionist. Explaind that this was attempted twice but patient was declined. Pleased referral for nutritionist and explained the importance of losing weight and how this will decrease her chances of having DM2 in the future. Patient, mother and  voiced understanding. She fever, chills or night sweats. No rhinorrhea or post nasal drip. No chest pain, dyspnea, palpitations, dizziness or LOC.      Patient Active Problem List    Diagnosis Date Noted   • Odynophagia 02/21/2018   • Gastroesophageal reflux disease without esophagitis 07/26/2017   • Prediabetes 05/25/2016   • Vitamin D deficiency 05/25/2016   • DELIA (obstructive sleep apnea) 05/25/2016   • Morbid obesity (HCC) 05/25/2016       Current Outpatient Prescriptions   Medication Sig Dispense Refill   • pantoprazole (PROTONIX) 20 MG tablet Take 2 Tabs by mouth every day. 90 Tab 4   • vitamin D, Ergocalciferol, (DRISDOL) 14782 units Cap capsule TAKE 1 CAP BY MOUTH EVERY 7 DAYS. 12 Cap 0     No current facility-administered medications for this visit.        ROS: As per HPI. Additional pertinent symptoms as noted below.  Constitutional: no fevers, chills, weight change  Eyes: no blurred vision, discharge, eye pain  ENT: no rhinorrhea, sore throat, neck masses  Cardiovascular: no angina, palpitations, PND, orthopnea, edema  Respiratory: no cough, sputum, or dyspnea  GI: no nausea, vomiting, abdominal pain, constipation, or diarrhea  : no dysuria, hematuria, frequency   Musculo-skeletal: no joint or muscle pain  Skin: no rashes or open  "wounds  Neurological: no headaches, dizziness, motor/sensory loss  Psychological: no anxiety or depression      /50 (BP Location: Left arm, Patient Position: Sitting, BP Cuff Size: Adult)   Pulse 65   Temp 36.8 °C (98.3 °F) (Temporal)   Ht 1.441 m (4' 8.75\")   Wt 104 kg (229 lb 3.2 oz)   SpO2 96%   BMI 50.04 kg/m²     Physical Exam   Constitutional:  oriented to person, place, and time. No distress.   Eyes: Pupils are equal, round, and reactive to light. No scleral icterus.  Neck: Neck supple. No thyromegaly present.   Cardiovascular: Normal rate, regular rhythm and normal heart sounds.  Exam reveals no gallop and no friction rub.  No murmur heard.  Pulmonary/Chest: Breath sounds normal. Chest wall is not dull to percussion.   Musculoskeletal:   no edema.   Lymphadenopathy: no cervical adenopathy  Neurological: alert and oriented to person, place, and time.   Skin: No cyanosis. Nails show no clubbing.      Note: I have reviewed all pertinent labs and diagnostic tests associated with this visit with specific comments listed under the assessment and plan below    Assessment and Plan    1. Morbid obesity (HCC)  -BMI still at 50  -Patient and mother reports nonadherence to diet  - reports increased exercise  -Patient and mother would like to hold on bariatric surgery for now  -Placed referral for nutrition  -Follow-up in 5 weeks    2. Prediabetes  -A1C at 6.2 today  -Patient and mother would like to continue to try diet  -Placed referral for nutrition  -Follow-up in 5 weeks    3. DELIA (obstructive sleep apnea)  -Noted for Mild DELIA on sleep study   -Was started on CPAP  -Explained importance of CPAP compliance  -Continue CPAP NS     4. Vitamin D deficiency  -Vit D at 25  -Continue Vit. D supplementation    5. Gastroesophageal reflux disease without esophagitis  -Continue Omeprazole      Followup: Return in about 5 weeks (around 11/27/2018) for Long.      Signed by: Kiet Torres, " M.D.

## 2018-11-14 ENCOUNTER — SLEEP CENTER VISIT (OUTPATIENT)
Dept: SLEEP MEDICINE | Facility: MEDICAL CENTER | Age: 28
End: 2018-11-14
Payer: MEDICAID

## 2018-11-14 VITALS
OXYGEN SATURATION: 96 % | WEIGHT: 228 LBS | RESPIRATION RATE: 15 BRPM | DIASTOLIC BLOOD PRESSURE: 82 MMHG | BODY MASS INDEX: 49.19 KG/M2 | HEART RATE: 80 BPM | SYSTOLIC BLOOD PRESSURE: 124 MMHG | HEIGHT: 57 IN

## 2018-11-14 DIAGNOSIS — G47.33 OSA (OBSTRUCTIVE SLEEP APNEA): ICD-10-CM

## 2018-11-14 PROCEDURE — 99213 OFFICE O/P EST LOW 20 MIN: CPT | Performed by: FAMILY MEDICINE

## 2018-11-14 RX ORDER — ZOLPIDEM TARTRATE 5 MG/1
5 TABLET ORAL NIGHTLY PRN
Qty: 2 TAB | Refills: 0 | Status: SHIPPED | OUTPATIENT
Start: 2018-11-14 | End: 2018-11-15

## 2018-11-14 NOTE — PROGRESS NOTES
[facility] Follow Up Note     Date: 11/14/2018 / Time: 2:54 PM    Patient ID:   Name:             Christi Mcgarry   YOB: 1990  Age:                 28 y.o.  female   MRN:               [id number]      Thank you for requesting a sleep medicine consultation on Christi Mcgarry at the sleep center. She presents today with the chief complaints of DELIA follow up.     HISTORY OF PRESENT ILLNESS:       Pt is currently suppose to be on Auto CPAP 5-15 cm however lost the CPAP since she was not been compliant. The CPAP was returned on 11/5/16 . As per her DME she will need to repeat the PSG to restablish the diagnosis. She denies issues with the mask intolerance, mask leak or pressure intolerability when she did try to use it. She just kept forgetting to use the CPAP. No change in sleep and medical hx. She goes to sleep around 10 pm and wakes up around 8 am. She is getting about 8 hrs of sleep on a good night The symptoms of excessive daytime, snoring and gasping has continued.         SLEEP HISTORY    The apnea hypopnea index was 9.4, the mean event duration was 14.7 seconds, and the longest event lasted 24.5 seconds. The patient never slept in the supine Position so this study may have under estimated the degreeofsleep-disordered breathing. The lowest saturation during sleep was 64% and saturations were less than 90% for a 0.3%      REVIEW OF SYSTEMS:       Constitutional: Denies fevers, Denies weight changes  Eyes: Denies changes in vision, no eye pain  Ears/Nose/Throat/Mouth: Denies nasal congestion or sore throat   Cardiovascular: Denies chest pain or palpitations   Respiratory: Denies shortness of breath , Denies cough  Gastrointestinal/Hepatic: Denies abdominal pain, nausea, vomiting, diarrhea, constipation or GI bleeding   Genitourinary: Denies bladder dysfunction, dysuria or frequency  Musculoskeletal/Rheum: Denies  joint pain and swelling   Skin/Breast: Denies rash,  "  Neurological: Denies headache, confusion, memory loss or focal weakness/parasthesias  Psychiatric: denies mood disorder     Comprehensive review of systems form is reviewed with the patient and is attached in the EMR.     PMH:  has a past medical history of ADHD (attention deficit hyperactivity disorder); Chickenpox; Headache (5/25/2016); Insomnia (5/25/2016); Learning disabilities; Morbid obesity (HCC) (5/25/2016); Nocturnal enuresis (5/25/2016); Obesity; DELIA (obstructive sleep apnea) (5/25/2016); Prediabetes (5/25/2016); Sleep apnea; and UTI (lower urinary tract infection) (5/25/2016). She also has no past medical history of Diabetes ([facility]).  MEDS:   Current Outpatient Prescriptions:   •  vitamin D, Ergocalciferol, (DRISDOL) 53578 units Cap capsule, TOME CELIO CAPSULA POR VIA ORAL EVERY 7 DAYS, Disp: 12 Cap, Rfl: 0  •  pantoprazole (PROTONIX) 20 MG tablet, Take 2 Tabs by mouth every day., Disp: 90 Tab, Rfl: 4  ALLERGIES: No Known Allergies  SURGHX:   Past Surgical History:   Procedure Laterality Date   • TONSILLECTOMY AND ADENOIDECTOMY  4/15/2013    Performed by Nadeem Briceno M.D. at SURGERY SAME DAY [facility]     SOCHX:  reports that she has never smoked. She has never used smokeless tobacco. She reports that she does not drink alcohol or use drugs..  FH:   Family History   Problem Relation Age of Onset   • Sleep Apnea Neg Hx          Physical Exam:  Vitals/ General Appearance:   Weight/BMI: Body mass index is 49.77 kg/m².  Blood pressure 124/82, pulse 80, resp. rate 15, height 1.441 m (4' 8.75\"), weight 103.4 kg (228 lb), SpO2 96 %.  Vitals:    11/14/18 1432   BP: 124/82   BP Location: Left arm   Patient Position: Sitting   BP Cuff Size: Adult   Pulse: 80   Resp: 15   SpO2: 96%   Weight: 103.4 kg (228 lb)   Height: 1.441 m (4' 8.75\")       Pt. is alert and oriented to time, place and person. Cooperative and in no apparent distress.       1. Head: Atraumatic, normocephalic.   2. Ears: Normal tympanic " membrane and no discharge  3. Nose: No inferior turbinate hypertophy  4. Throat: Oropharynx appears crowded in that the palate is overhanging  5. Neck: Supple. No thyromegaly  6. Chest: Trachea central, no spine deformity   7. Lungs auscultation: B/L good air entry, vesicular breath sounds, no adventitious sounds  8. Heart auscultation: 1st and 2nd heart sounds normal, regular rhythm. No appreciable murmur.  9.  Extremities: no clubbing, no pedal edema.  11. Skin: No rash        INVESTIGATIONS:           ASSESSMENT AND PLAN     1. Sleep Apnea (DELIA).      The pathophysiology of sleep anea and the increased risk of cardiovascular morbidity from untreated sleep apnea is discussed in detail with the patient.    She is urged to avoid supine sleep, weight gain and alcoholic beverages since all of these can worsen sleep apnea. She is cautioned against drowsy driving. If She feels sleepy while driving, She must pull over for a break/nap, rather than persist on the road, in the interest of She own safety and that of others on the road.   Plan   - Modified Split night study to restablish the diagnosis and to figure out the pressure needed to support her airway    - compliance was reinforced     2. Regarding treatment of other past medical problems and general health maintenance,  She is urged to follow up with PCP.    Ambien is prescribed only for the night of the SS. I have obtained and reviewed patient utilization report from [employer] prior to writing prescription for controlled substance II, III or IV. Based on the report and my clinical assessment the prescription is medically necessary. Pt was advised to use Ambien appropriately. Do not drive or use fast moving machinery after taking the medication. Side affects were discussed in detail.     Narcotics: 20 (Value from [employer].)    Sedatives: 30 (Value from [employer].)    Stimulants: 0 (Value from [employer].)     NARx[employer]ORES can range from 000 to  999. This first two digits represent the composite percentile risk based on an overall analysis of prescription drug use. The third digit represents the number of active prescriptions. The distribution of scores in the population is such that approximately 75% fall below 200, 95% fall below 500 and 99% fall below 650.     SELECT THE LINK BELOW TO REVIEW THE [employer] REPORT  or  SELECT THE 'ACCEPT' BUTTON TO CONTINUTE AFTER REVIEWING THE SCORES

## 2018-11-26 ENCOUNTER — SLEEP STUDY (OUTPATIENT)
Dept: SLEEP MEDICINE | Facility: MEDICAL CENTER | Age: 28
End: 2018-11-26
Attending: FAMILY MEDICINE
Payer: MEDICAID

## 2018-11-26 DIAGNOSIS — G47.33 OSA (OBSTRUCTIVE SLEEP APNEA): ICD-10-CM

## 2018-11-26 PROCEDURE — 95811 POLYSOM 6/>YRS CPAP 4/> PARM: CPT | Performed by: INTERNAL MEDICINE

## 2018-11-27 NOTE — PROCEDURES
Clinical Comments:    The patient underwent a split night polysomnogram with a CPAP titration using the standard montage for measurement of paramaters of sleep, respiratory events, movement abnormalities, heart rate and rhythm.  A Microphone was used to monitior snoring.  Interpretation:  Study start time was 09:16:45 PM.  Total recording time was 3h 15.5m (195 minutes) with a total sleep time of 2h 29.5m (149 minutes) resulting in a sleep efficiency of 76.47%.  Sleep latency from the start fo the study was 29 minutes minutes and REM latency from sleep onset was 158 minutes minutes.  Respiratory:   There were 0 apneas in total consisting of 0 obstructive apneas, 0 mixed apneas, and 0 central apneas.  There were 27 hypopneas in total.  The apnea index was 0.00 per hour and the hypopnea index was 10.84 per hour.  The overall AHI was 10.8, with a REM AHI of 37.50, and a supine AHI of 5.37.  Limb Movements:  There were a total of 20 periodic leg movements, of which 2 were PLMS arousals.  This resulted in a PLMS index of 8.0 and a PLMS arousal index of 0.8  Oximetry:  The mean SaO2 was 90.0% for the diagnostic portion of the study, with a minimum SaO2 of 85.0%.    Treatment:  Interpretation:  Treatment recording time was 5h 19.0m (319 minutes) with a total sleep time of 5h 6.0m (306 minutes) resulting in a sleep efficiency of 95.9%.    Sleep latency from the start of treatment was 00 minutes minutes and REM latency from sleep onset was 0h 0.0m minutes.    The patient had 36 arousals in total for an arousal index of 7.1.  Respiratory:   There were 2 apneas in total consisting of  1 obstructive apneas, 1 central apneas, and 0 mixed apneas for an apnea index of 0.39.    The patient had 13 hypopneas in total, which resulted in a hypopnea index of 2.55.    The overall AHI was 2.94, with a REM AHI of 7.30, and a supine AHI of 3.05.     Limb Movements:  There were a total of 12 periodic leg movements, of which 3 were PLMS  arousals.  This resulted in a PLMS index of 2.4 and a PLMS arousal index of 0.6.  Oximetry:  The mean SaO2 during treatment was 92.0%, with a minimum oxygen saturation of 86.0%.    CPAP was tried from 5cm to 9cm of H2O.    RECORDING TECHNIQUE:       After the scalp was prepared, gold plated electrodes were applied to the scalp according to the International 10-20 System. EEG (electroencephalogram) was continuously monitored from the O1-M2, O2-M1, C3-M2, C4-M1, F3-M2, and F4-M1.   EOGs (electrooculograms) were monitored by electrodes placed at the left and right outer canthi.  Chin EMG (electromyogram) was monitored by electrodes placed on the mentalis and sub-mentalis muscles.  Nasal and oral airflow were monitored using a triple port thermocouple as well as oronasal pressure transducer.  Respiratory effort was measured by inductive plethysmography technology employing abdominal and thoracic belts.  Blood oxygen saturation and pulse were monitored by pulse oximetry.  Heart rhythm was monitored by surface electrocardiogram.  Leg EMG was studied using surface electrodes placed on left and right anterior tibialis.  A microphone was used to monitor tracheal sounds and snoring.  Body position was monitored and documented by technician observation      SUMMARY:    This was an overnight diagnostic polysomnogram with a subsequent positive airway pressure titration, also known as a split- night study.  The patient chose to use a small Quattro air mask with heated humidification.    During the diagnostic phase the total recording time was 195 minutes, the sleep period time was 166 minutes, and the total sleep time was 149 minutes.  The patient's sleep efficiency was 76.47 % which is reduced.  The patient experienced 1 REM periods.    The sleep stage durations revealed 60.5 minutes of wake after sleep onset (WASO),   17 minutes of N1 sleep, 122 minutes of N2 sleep, 2 minutes of N3 sleep, and 8 minutes of REM.    The latency to  sleep was 29 minutes which is prolonged.  The latency to REM was 158 minutes which is prolonged.  Mild sleep fragmentation occurred.  The arousal index was 14.  The Total Limb Movement (isolated) Index was 8, the Limb Movement with Arousal Index was 4, and the PLM Series Index was 7.2.    The patient experienced 0 apneas and 27 hypopneas and 3 RERAS.  The apnea hypopnea index was 10.8, the RDI was 10.8, the mean event duration was 17.8 seconds, and the longest event lasted 38.6 seconds.  The REM index was 2.0 and the supine index was 1.6.  The apnea hypopnea index represents mild sleep apnea syndrome.    The jacquelyn saturation during sleep was 85 % and the patient spent 57.6 % of the recording with saturations less than or equal to 90%.    During sleep, the minimum heart rate was 81 bpm, the mean heart rate was 101 bpm, and the maximum heart rate was 127 bpm.    Once the patient met the split-night protocol, the technician performed a positive airway pressure titration.  The technician initiated treatment with CPAP set at 5 cmH2O and increased the pressure to a maximum of 9 cmH2O.    The patient did best on 9 cm water with a resultant AHI of 0, a minimum saturation of 87 %, and a mean saturation of 92 %.  Patient achieved both supine and REM on CPAP at 9 cm water    ASSESSMENT:    Mild obstructive sleep apnea hypopnea - AHI 10.8  Persistent nocturnal desaturation - jacquelyn saturation 85 % - saturations <90% for 57.6% of the diagnostic phase  Satisfactory CPAP titration to a final pressure of CPAP 9 cm water with a resultant AHI of 0.0, a minimum saturation of 87%, a mean saturation of 92%, and the achievement of both supine and REM sleep.        RECOMMENDATION:    Recommend CPAP 9 cmH2O using a mask of choice and heated humidification followed by data card and clinical review in 6-8 weeks.  Patient successfully used a small Quattro mask during the therapeutic phase.

## 2018-11-29 ENCOUNTER — APPOINTMENT (OUTPATIENT)
Dept: INTERNAL MEDICINE | Facility: MEDICAL CENTER | Age: 28
End: 2018-11-29
Payer: MEDICAID

## 2018-12-06 ENCOUNTER — APPOINTMENT (OUTPATIENT)
Dept: SLEEP MEDICINE | Facility: MEDICAL CENTER | Age: 28
End: 2018-12-06
Payer: MEDICAID

## 2018-12-07 ENCOUNTER — OFFICE VISIT (OUTPATIENT)
Dept: INTERNAL MEDICINE | Facility: MEDICAL CENTER | Age: 28
End: 2018-12-07
Payer: MEDICAID

## 2018-12-07 VITALS
SYSTOLIC BLOOD PRESSURE: 106 MMHG | TEMPERATURE: 98.3 F | RESPIRATION RATE: 16 BRPM | HEIGHT: 57 IN | WEIGHT: 230 LBS | BODY MASS INDEX: 49.62 KG/M2 | OXYGEN SATURATION: 95 % | DIASTOLIC BLOOD PRESSURE: 72 MMHG | HEART RATE: 71 BPM

## 2018-12-07 DIAGNOSIS — E66.01 MORBID OBESITY (HCC): ICD-10-CM

## 2018-12-07 DIAGNOSIS — L30.9 ECZEMA, UNSPECIFIED TYPE: ICD-10-CM

## 2018-12-07 DIAGNOSIS — G47.33 OSA (OBSTRUCTIVE SLEEP APNEA): ICD-10-CM

## 2018-12-07 DIAGNOSIS — Z23 ENCOUNTER FOR VACCINATION: ICD-10-CM

## 2018-12-07 PROCEDURE — 90714 TD VACC NO PRESV 7 YRS+ IM: CPT | Performed by: INTERNAL MEDICINE

## 2018-12-07 PROCEDURE — 90471 IMMUNIZATION ADMIN: CPT | Performed by: INTERNAL MEDICINE

## 2018-12-07 PROCEDURE — 99214 OFFICE O/P EST MOD 30 MIN: CPT | Mod: GC,25 | Performed by: INTERNAL MEDICINE

## 2018-12-07 RX ORDER — ZOLPIDEM TARTRATE 5 MG/1
TABLET ORAL
Refills: 0 | COMMUNITY
Start: 2018-11-16 | End: 2018-12-07

## 2018-12-07 ASSESSMENT — PAIN SCALES - GENERAL: PAINLEVEL: NO PAIN

## 2018-12-07 NOTE — PROGRESS NOTES
Established Patient    Christi presents today with the following:    CC: skin rash     HPI: 28 yr old female patient with PMHx of GERD, prediabetes, vitamin D deficiency,obstructive sleep apnea and morbid obesity comes in for evaluation of skin rash and follow-up on her chronic conditions.    #Skin rash: Describes small raised dry papules located on both sides of the trunk and upper back.  Has been present for the past few weeks.  Denies pain, swelling, redness, discharge or any signs of infection.  Patient has been in contact with fur of the dogs and cats at her workplace.    #Sleep apnea:  Has an upcoming appointment with the pulmonology. Currently not using CPAP machine due to insurance issues.    #Morbid obesity: No significant change in weight since last visit. Has upcoming appointment with nutrition services next week.  Patient, patient's family state she has been working on her diet and exercise.    Otherwise feels better and She denies fever, chills or night sweats. No rhinorrhea or post nasal drip. No chest pain, dyspnea, palpitations, dizziness or LOC.        Patient Active Problem List    Diagnosis Date Noted   • Odynophagia 02/21/2018   • Gastroesophageal reflux disease without esophagitis 07/26/2017   • Prediabetes 05/25/2016   • Vitamin D deficiency 05/25/2016   • DELIA (obstructive sleep apnea) 05/25/2016   • Morbid obesity (HCC) 05/25/2016       Current Outpatient Prescriptions   Medication Sig Dispense Refill   • vitamin D, Ergocalciferol, (DRISDOL) 85320 units Cap capsule TOME CELIO CAPSULA POR VIA ORAL EVERY 7 DAYS 12 Cap 0   • pantoprazole (PROTONIX) 20 MG tablet Take 2 Tabs by mouth every day. 90 Tab 4     No current facility-administered medications for this visit.        Social History     Social History   • Marital status: Single     Spouse name: N/A   • Number of children: N/A   • Years of education: N/A     Occupational History   • Not on file.     Social History Main Topics   • Smoking status:  "Never Smoker   • Smokeless tobacco: Never Used   • Alcohol use No   • Drug use: No   • Sexual activity: Not on file     Other Topics Concern   • Not on file     Social History Narrative   • No narrative on file       Family History   Problem Relation Age of Onset   • Sleep Apnea Neg Hx        ROS: As per HPI. Additional pertinent symptoms as noted below.  Constitutional: Denies fever/chills/weight changes.   Eyes: Denies changes/pain in vision  ENT: Denies sore throat/congestion/ear ache.   Cardiovascular: Denies chest pain /palpitations/edema.   Respiratory: Denies SOB/cough/PND/orthopnea  Abdomen: Denies difficulty swallowing/ diarrhea/constipation/abdominal pain/nausea/vomiting  Genitourinary: Normal urinary habits.   Musculo-skeletal: normal ambulation.Denies muscle or joint pains.  Skin: +rash on upper back and trunk  Neurological: Denies weakness/tingling/numbness/headache  Psychological: good mood and cooperative. Denies anxiety /depression       /72 (BP Location: Left arm, Patient Position: Sitting, BP Cuff Size: Adult long)   Pulse 71   Temp 36.8 °C (98.3 °F) (Temporal)   Resp 16   Ht 1.441 m (4' 8.75\")   Wt 104.3 kg (230 lb)   LMP 12/07/2018 (Exact Date)   SpO2 95%   Breastfeeding? No   BMI 50.21 kg/m²     Physical Exam  General:  Alert and oriented, No apparent distress.    Eyes: Pupils equal and reactive. No scleral icterus.    Throat: Clear no erythema or exudates noted.    Neck: Supple. No lymphadenopathy noted. Thyroid not enlarged.    Lungs: Clear to auscultation and percussion bilaterally.    Cardiovascular: Regular rate and rhythm. No murmurs, rubs or gallops.    Abdomen: Obese.  Benign. No rebound or guarding noted.    Extremities: No clubbing, cyanosis, edema.    Skin: Dry eczematous lesions on the upper back and trunk noted on both sides    Neurological: Oriented to time, place, and person .Cranial nerves intact. No motor/sensory deficits.Reflexes were normal and symmetrical in " both upper and lower extremities     Musculoskeletal : NROM of all extremities. No tenderness or deformity noted.     Note: I have reviewed all pertinent labs and diagnostic tests associated with this visit with specific comments listed under the assessment and plan below      Assessment and Plan    1. Eczema, unspecified type  -Advised to use moisturizing lotions- Cerave  -If moisturizing lotions do not improve the rash of the symptoms advised to use over-the-counter topical steroid 1% cream or ointment to the affected area.    2. Morbid obesity (HCC)  --Advised to eat healthy-food rich in fruits and vegetables-fiber, limiting carbohydrates and junk food  -Advised to exercise regularly at least 30 minutes a day for 5 days a week  -BMI  50.21  -has upcoming appt with nutrition services next week.If no improvement in weight in future then bariatric surgery can be considered with consent of patient and family.  -Follow-up in 6 weeks with PCP    3. DELIA (obstructive sleep apnea)  -Was on CPAP in the past but due to insurance issues she had to re-establish with pulmonology and sleep center.  -Explained importance of CPAP compliance  -Has upcoming appointment with the pulmonology and has been following with them.    4. Encounter for vaccination  -due for tetanus- ordered TD and administered      Signed by: Fran Reece M.D.

## 2018-12-07 NOTE — NON-PROVIDER
"Christi Mcgarry is a 28 y.o. female here for a non-provider visit for:   TD    Reason for immunization: Overdue/Provider Recommended  Immunization records indicate need for vaccine: Yes, confirmed with Epic  Minimum interval has been met for this vaccine: Yes  ABN completed: Not Indicated    Order and dose verified by: Minda MONTALVO Dated  4/11/17 was given to patient: Yes  All IAC Questionnaire questions were answered \"No.\"    Patient tolerated injection and no adverse effects were observed or reported: Yes    Pt scheduled for next dose in series: Not Indicated    "

## 2018-12-11 ENCOUNTER — SLEEP CENTER VISIT (OUTPATIENT)
Dept: SLEEP MEDICINE | Facility: MEDICAL CENTER | Age: 28
End: 2018-12-11
Payer: MEDICAID

## 2018-12-11 VITALS
SYSTOLIC BLOOD PRESSURE: 130 MMHG | DIASTOLIC BLOOD PRESSURE: 70 MMHG | BODY MASS INDEX: 51.07 KG/M2 | RESPIRATION RATE: 16 BRPM | WEIGHT: 227 LBS | HEIGHT: 56 IN | TEMPERATURE: 96.7 F | OXYGEN SATURATION: 95 % | HEART RATE: 72 BPM

## 2018-12-11 DIAGNOSIS — G47.33 OSA (OBSTRUCTIVE SLEEP APNEA): ICD-10-CM

## 2018-12-11 PROCEDURE — 99213 OFFICE O/P EST LOW 20 MIN: CPT | Performed by: NURSE PRACTITIONER

## 2018-12-11 NOTE — PROGRESS NOTES
Chief Complaint   Patient presents with   • Results     SS       HPI:  Christi Mcgarry is a 28 y.o. year old female here today for follow-up on results of her Polysomnogram. She has a history of sleep apnea. Sleep study in April 2018 indicated mild obstructive with an AHI of 9.4 and a low 02 of 64%. However severity of sleep apnea was felt to be underestimated due to lack of supine sleep. She was started on Auto CPAP 5-15 CM H20. However did not meet compliance and required a repeat study for re qualification. She is interested in restarting CPAP. She does snore at night. She denies insomnia. She is waking un refreshed and is tired during the day. She denies any morning headaches. Repeat PSG 11/26/2018 indicates mild obstructive sleep apnea with an AHI of 10.8 with low 02 of 85%. She spent 57.6% of the diagnostic portion with saturations less than 90%. She was titrated to a CPAP pressure of 9 CM H20 with a resultant AHI of 0, mean 02 of 92%, low 87% with achievement of both supine and REM sleep.           Past Medical History:   Diagnosis Date   • ADHD (attention deficit hyperactivity disorder)    • Chickenpox    • Headache 5/25/2016   • Insomnia 5/25/2016   • Learning disabilities    • Morbid obesity (HCC) 5/25/2016   • Nocturnal enuresis 5/25/2016   • Obesity    • DELIA (obstructive sleep apnea) 5/25/2016   • Prediabetes 5/25/2016   • Sleep apnea    • UTI (lower urinary tract infection) 5/25/2016       Past Surgical History:   Procedure Laterality Date   • TONSILLECTOMY AND ADENOIDECTOMY  4/15/2013    Performed by Nadeem Briceno M.D. at SURGERY SAME DAY [facility]       Family History   Problem Relation Age of Onset   • Sleep Apnea Neg Hx        Social History     Social History   • Marital status: Single     Spouse name: N/A   • Number of children: N/A   • Years of education: N/A     Occupational History   • Not on file.     Social History Main Topics   • Smoking status: Never Smoker   • Smokeless tobacco:  "Never Used   • Alcohol use No   • Drug use: No   • Sexual activity: Not on file     Other Topics Concern   • Not on file     Social History Narrative   • No narrative on file       ROS:  Constitutional: Denies fevers, chills, sweats, weight loss  Eyes: Denies vision loss, pain, drainage, double vision. Wears glasses   Ears/Nose/Mouth/Throat: Denies rhinitis, nasal congestion, ear ache, difficulty hearing, sore throat, persistent hoarseness, decayed teeth/toothache  Cardiovascular: Denies chest pain, tightness, palpitations, swelling in feet/legs, fainting, difficulty breathing when laying down  Respiratory: Denies shortness of breath, cough, sputum, wheezing, painful breathing, coughing up blood  GI: Denies heartburn, difficulty swallowing, nausea, vomiting, abdominal pain, diarrhea, constipation  : Denies frequent urination, painful urination  Integumentary: Denies rashes, lumps or color changes  MSK: Denies painful joints, sore muscles, and back pain.   Neurological: Denies frequent headaches, dizziness, weakness  Sleep: See HPI       Current Outpatient Prescriptions   Medication Sig Dispense Refill   • vitamin D, Ergocalciferol, (DRISDOL) 13057 units Cap capsule TOME CELIO CAPSULA POR VIA ORAL EVERY 7 DAYS 12 Cap 0   • pantoprazole (PROTONIX) 20 MG tablet Take 2 Tabs by mouth every day. 90 Tab 4     No current facility-administered medications for this visit.        No Known Allergies    Blood pressure 130/70, pulse 72, temperature 35.9 °C (96.7 °F), temperature source Temporal, resp. rate 16, height 1.422 m (4' 8\"), weight 103 kg (227 lb), last menstrual period 12/07/2018, SpO2 95 %, not currently breastfeeding.    PE:   Appearance: Well developed, well nourished, no acute distress  Eyes: PERRL, EOM intact, sclera white, conjunctiva moist  Ears: no lesions or deformities  Hearing: grossly intact  Nose: no lesions or deformities  Oropharynx: tongue normal, posterior pharynx without erythema or exudate  Mallampati " Classification: Class 4   Neck: supple, trachea midline, no masses   Respiratory effort: no intercostal retractions or use of accessory muscles  Lung auscultation: no rales, rhonchi or wheezes  Heart auscultation: no murmur rub or gallop  Extremities: no cyanosis or edema  Abdomen: soft ,non tender, no masses  Gait and Station: normal  Digits and nails: no clubbing, cyanosis, petechiae or nodes.  Cranial nerves: grossly intact  Skin: no rashes, lesions or ulcers noted  Orientation: Oriented to time, person and place  Mood and affect: mood and affect appropriate, normal interaction with examiner  Judgement: Intact          Assessment:    1. DELIA (obstructive sleep apnea)  DME CPAP   2. BMI 50.0-59.9, adult (HCC)           Plan:      1) Reviewed sleep study in detail. Discussed pathophysiology of sleep apnea and various treatment options in detail. Order for CPAP at 9 CM H20.   2) Sleep hygiene discussed.   3) Weight loss recommended.   4) Follow up in 2 months with compliance card download, sooner if needed.

## 2018-12-11 NOTE — PATIENT INSTRUCTIONS
Plan:      1) Reviewed sleep study in detail. Discussed pathophysiology of sleep apnea and various treatment options in detail. Order for CPAP at 9 CM H20.   2) Sleep hygiene discussed.   3) Weight loss recommended.   4) Follow up in 2 months with compliance card download, sooner if needed.

## 2018-12-13 NOTE — PROGRESS NOTES
Established Patient    Christi presents today with the following:    CC: Follow-up for dysmenorrhea    HPI: 26-year-old female with a past medical history of morbid obesity, prediabetes, GERD, DELIA, and vitamin D deficiency, comes to clinic for dysmenorrhea/vagina secretion workup. Patient reports resolution of anginal discharge and odor. Lab workup negative for vaginitis. Patient's last menstrual period on 10/22/17 which lasted for 3 days and she used up a total of 5 packets. Patient's last A1c at 6.4 and BMI today 49 for which patient was counseled extensively on her prediabetic state as well as proper nutrition and exercise. Patient was to follow-up with [facility] but is unable to as insurance does not cover. Repeat A1c as last one was more than 6 months. Patient with history of DELIA for which she was referred to pulmonology for CPAP indication. Patient to follow-up in 5 weeks with A1c results.    Patient Active Problem List    Diagnosis Date Noted   • Gastroesophageal reflux disease without esophagitis 07/26/2017   • Tension-type headache 07/26/2017   • Tension-type headache, not intractable 07/26/2017   • Prediabetes 05/25/2016   • Vitamin D deficiency 05/25/2016   • DELIA (obstructive sleep apnea) 05/25/2016   • Morbid obesity (CMS-HCC) 05/25/2016       Current Outpatient Prescriptions   Medication Sig Dispense Refill   • pantoprazole (PROTONIX) 20 MG tablet Take 1 Tab by mouth every day. 30 Tab 1   • vitamin D, Ergocalciferol, (DRISDOL) 48929 UNITS Cap capsule Take 1 Cap by mouth every 7 days. 8 Cap 5     No current facility-administered medications for this visit.        ROS: As per HPI. Additional pertinent symptoms as noted below.  Constitutional: no fevers, chills, weight change  Eyes: no blurred vision, discharge, eye pain  ENT: no rhinorrhea, sore throat, neck masses  Cardiovascular: no angina, palpitations, PND, orthopnea, edema  Respiratory: no cough, sputum, or dyspnea  GI: no nausea,  "vomiting, abdominal pain, constipation, or diarrhea  : no dysuria, hematuria, frequency   Musculo-skeletal: no joint or muscle pain  Skin: no rashes or open wounds  Neurological: no headaches, dizziness, motor/sensory loss  Psychological: no anxiety or depression    /90   Pulse 94   Temp 36.7 °C (98.1 °F)   Ht 1.441 m (4' 8.75\")   Wt 102.7 kg (226 lb 6 oz)   SpO2 95%   BMI 49.42 kg/m²     Physical Exam   Constitutional: Morbidly obese, oriented to person, place, and time. No distress.   Eyes: Pupils are equal, round, and reactive to light. No scleral icterus.  Neck: Neck supple. No thyromegaly present.   Cardiovascular: Normal rate, regular rhythm and normal heart sounds.  Exam reveals no gallop and no friction rub.  No murmur heard.  Pulmonary/Chest: Breath sounds normal. Chest wall is not dull to percussion.   Musculoskeletal:   no edema.   Lymphadenopathy: no cervical adenopathy  Neurological: alert and oriented to person, place, and time.   Skin: No cyanosis. Nails show no clubbing.      Note: I have reviewed all pertinent labs and diagnostic tests associated with this visit with specific comments listed under the assessment and plan below    Assessment and Plan    1. Prediabetes  - Last A1c at 6.4  - Patient with morbid obesity  - Patient consult on proper diet and exercise  - Previously referred to nutritionist but insurance cover  - Repeat A1c  - Follow up in 5 weeks    2. Morbid obesity (CMS-[facility])  - BMI at 49  - Encouraged patient to lose weight  - Patient education on proper diet  - Encouraged patient to exercise  - Patient to lose 10 pounds by next visit  - Follow up 5 weeks     3. Vitamin D deficiency  - Continue vitamin D replacement    4. Gastroesophageal reflux disease without esophagitis  - Patient was switched to pantoprazole    5. DELIA (obstructive sleep apnea)  - Family member reports patient's snoring during sleep  - Patient notes fatigue during the day  - Patient has history of CPAP " medical evaluation use  - Refer to pulmonology for CPAP indications    6. Dysmenorrhea  - LMP on 10/22/17  - Regular menstrual bleed (5 pads)  - Regular menstrual bleed (3 days)   - Patient is not sexually active (reports being a virgin)  - Transabdominal U/S unremarkable  - Close observation for now     7. Vaginal discharge  - Reports resolution of clear vaginal discharge  - Reports no fishy odor  - Negative vaginitis workup  - Close observation for now    8. Need for immunization against influenza  - She received flu shot      Followup: Return in about 5 weeks (around 12/13/2017) for Short.      Signed by: Kiet Torres M.D.

## 2019-01-09 ENCOUNTER — NON-PROVIDER VISIT (OUTPATIENT)
Dept: HEALTH INFORMATION MANAGEMENT | Facility: MEDICAL CENTER | Age: 29
End: 2019-01-09
Payer: MEDICAID

## 2019-01-09 VITALS — WEIGHT: 229.9 LBS | BODY MASS INDEX: 53.2 KG/M2 | HEIGHT: 55 IN

## 2019-01-09 DIAGNOSIS — E66.01 MORBID (SEVERE) OBESITY DUE TO EXCESS CALORIES (HCC): ICD-10-CM

## 2019-01-09 PROCEDURE — 97802 MEDICAL NUTRITION INDIV IN: CPT | Performed by: DIETITIAN, REGISTERED

## 2019-01-09 NOTE — PROGRESS NOTES
"1/9/2019    Kiet Sanz*  28 y.o.     Time in/out: 3:02-3:43    Anthropometrics/Objective  Vitals:    01/09/19 1547   Weight: 104.3 kg (229 lb 14.4 oz)   Height: 1.372 m (4' 6\")     Body mass index is 55.43 kg/m².      Client history:  PMH: moderate intellectual disability (unspecified), ADHD, pre-DM, DELIA, GERD,  VDD and hx of odynophagia (resolved)  Pertinent Medications Include: protonix, VitD    Biochemical data, medical test and procedures  Lab Results   Component Value Date/Time    HBA1C 6.2 10/23/2018 03:10 PM     No results found for: POCGLUCOSE  No results found for: CHOLSTRLTOT, LDL, HDL, TRIGLYCERIDE         Subjective: Patient is being referred for MNT today for the treatment of morbid obesity.   - Christi is here today with her , Miley.   - Christi lives at home with her mother who does the grocery shopping and cooking.   - Christi eats primarily at home as well as at her friends house, where she frequently goes during her days   - It is noted her doctors have considered bariatric sx as an option for Christi per chart review.   - Christi states it was her doctors idea for her to come to meet w/ an RD, not necessarily her idea/wishing   - She skips breakfast due to not being hungry and eats for the first time around noon   - She denies snacking and drinking juice, even though they are listed on her food journal included in the pre-appointment paperwork. It states on her paperwork she snacks on chips and peanuts (presumably filled out by ).   - Christi states she eats out or has fast food only \"somtimes\" but unable to explain how frequently that is   - She denies sweets, pastries   - Only DA Barraza likes is walking and this has been discussed with her by her other health care providers and part of her care plan per Miley canas, has coffee w/ flavored creamer  S - pt denies, but cited as having peanuts on paperwork w/ water or juice  L - per pt, 1/3 cup beans w/ 1 egg " and 3 corn tortilla or cereal w/ 2% milk w/ regular soda  S - pt denies, but cited as having chips on paperwork  D - last night Christi states she had 1/2 cup rice w/ fish  S - denies  Drinks: 2% milk, coffee w/ flavored creamer, regular soda and likely, although denied juice      Nutrition Diagnosis (PES Statement)  Problem (Nutrition diagnosis)  1. Obesity    Etiology(Addresses the cause,contributing factors)  1. R/t excess caloric intake and inadequate energy expenditure    Signs/Symptoms (Address observations and stated info: subjective and objective data)  1. As evidenced by BMI    Nutrition Intervention  Nutrition Prescription  Recommended Daily Kcals: 1600 kcal/d per MS[facility]    Recommend Therapeutic Dietary Intervention  General, healthful diet that is calorie/portion controlled.     Comprehensive Nutrition education Instruction or training leading to in-depth nutrition related knowledge about:  Combine carb, protein and fat at each meal, Meal timing and spacing, Physical activity/exercise, Portion control, Decreasing PO intake, Label Reading and Handouts provided regarding topics discussed   - MyPlate   - Snack list w/ fruit    Monitoring & Evaluation Plan    Behavioral-Environmental:  Physical activity: 150 min moderate PA per week    Food / Nutrient Intake:  Use MyPlate and handy-portions as well as label reading for portion control at meals. Avoid snacking when not hungry, like being bored or lonely. No SSB.     Physical Signs / Symptoms:  HbA1c profiles WNL or improved from 6.2% and Weight loss of 1-2 lbs per week towards BMI < 30     Assessment Notes: Christi states she is primarily here because her doctors wanted her to and although she knows she is overweight, seems somewhat ambivalent regarding the consequences of continuing at her current weight or further weight gain. Her mother is the person who shops and cooks for Christi and is unfortunately not here today, however Christi and her  ensure me  the information reviewed today would be passed on and translated as needed. Given Christi's mild learning deficit, I used MyPlate and Handy Portions as primary tool for portion control. Identified that Christi's current diet is low in NSV and fruits and recommended these be increased to 1/2 plate or 2 open hands 2 times per day. I strongly encouraged Christi avoid SSB, soda and juice and recommended water as best alternative, but diet/sugar-free/low-calorie drinks also OK. We discussed keeping CHO foods to 1/4th plate at meals and balancing meals w/ proteins and NSV. I suspect Christi was not as forthcoming with her true dietary intake, thus difficult to make specific recommendations. Overall, Christi did not appear to be very engaged or motivated for change at this time. Bariatric surgery is also not a recommended course of action for the treatment of obesity due to post-op dietary recommendations will likely not be well followed.     F/u: PRN. Encouraged f/u for accountability if she would like help setting specific goals for weight loss. She will go home and think about it and call back if interested.

## 2019-02-04 ENCOUNTER — OFFICE VISIT (OUTPATIENT)
Dept: INTERNAL MEDICINE | Facility: MEDICAL CENTER | Age: 29
End: 2019-02-04
Payer: MEDICAID

## 2019-02-04 VITALS
TEMPERATURE: 98.1 F | DIASTOLIC BLOOD PRESSURE: 70 MMHG | WEIGHT: 230.6 LBS | HEART RATE: 71 BPM | SYSTOLIC BLOOD PRESSURE: 110 MMHG | HEIGHT: 55 IN | OXYGEN SATURATION: 95 % | BODY MASS INDEX: 53.37 KG/M2

## 2019-02-04 DIAGNOSIS — E66.01 MORBID OBESITY (HCC): ICD-10-CM

## 2019-02-04 DIAGNOSIS — R73.03 PREDIABETES: ICD-10-CM

## 2019-02-04 DIAGNOSIS — K21.9 GASTROESOPHAGEAL REFLUX DISEASE WITHOUT ESOPHAGITIS: ICD-10-CM

## 2019-02-04 DIAGNOSIS — E55.9 VITAMIN D DEFICIENCY: ICD-10-CM

## 2019-02-04 PROCEDURE — 99214 OFFICE O/P EST MOD 30 MIN: CPT | Mod: GC | Performed by: INTERNAL MEDICINE

## 2019-02-04 RX ORDER — ERGOCALCIFEROL 1.25 MG/1
50000 CAPSULE ORAL
Qty: 12 CAP | Refills: 0 | Status: SHIPPED | OUTPATIENT
Start: 2019-02-04 | End: 2019-04-24 | Stop reason: SDUPTHER

## 2019-02-05 NOTE — PROGRESS NOTES
Established Patient    Christi presents today with the following:    CC:     HPI: 27-year-old Morbidly obeast female with a past medical history of morbid obesity, prediabetes, GERD, DELIA on CPAP followed by pulmonology, and vitamin D deficiency on supplementation comes to clinic for routine follow up. Patient BMI still in 50s. Patient and mother report improvement with diet as the saw a nutritionist and she is following designed meal plan. Patient reports improvement with previously reported left abdominal rash. She is using a moisturizing cream. Additionally, she reports a 1 cm vaginal swelling for which she deferred physical examination as she is currently undergoing her menstrual period and would like for it to be examined by a female physician. She denies constitutional symptoms. No rhinorrhea or post nasal drip. No chest pain, dyspnea, palpitations, dizziness or LOC.      Patient Active Problem List    Diagnosis Date Noted   • Odynophagia 02/21/2018   • Gastroesophageal reflux disease without esophagitis 07/26/2017   • Prediabetes 05/25/2016   • Vitamin D deficiency 05/25/2016   • DELIA (obstructive sleep apnea) 05/25/2016   • Morbid obesity (HCC) 05/25/2016       Current Outpatient Prescriptions   Medication Sig Dispense Refill   • vitamin D, Ergocalciferol, (DRISDOL) 03700 units Cap capsule Take 1 Cap by mouth every 7 days. 12 Cap 0   • pantoprazole (PROTONIX) 20 MG tablet Take 2 Tabs by mouth every day. 90 Tab 4     No current facility-administered medications for this visit.        ROS: As per HPI. Additional pertinent symptoms as noted below.  Constitutional: no fevers, chills, weight change  Eyes: no blurred vision, discharge, eye pain  ENT: no rhinorrhea, sore throat, neck masses  Cardiovascular: no angina, palpitations, PND, orthopnea, edema  Respiratory: no cough, sputum, or dyspnea  GI: no nausea, vomiting, abdominal pain, constipation, or diarrhea  : no dysuria, hematuria, frequency    "  Musculo-skeletal: no joint or muscle pain  Skin: no rashes or open wounds  Neurological: no headaches, dizziness, motor/sensory loss  Psychological: no anxiety or depression      /70 (BP Location: Left arm, Patient Position: Sitting, BP Cuff Size: Large adult)   Pulse 71   Temp 36.7 °C (98.1 °F) (Temporal)   Ht 1.372 m (4' 6\")   Wt 104.6 kg (230 lb 9.6 oz)   SpO2 95%   BMI 55.60 kg/m²     Physical Exam   Constitutional:  oriented to person, place, and time. No distress.   Eyes: Pupils are equal, round, and reactive to light. No scleral icterus.  Neck: Neck supple. No thyromegaly present.   Cardiovascular: Normal rate, regular rhythm and normal heart sounds.  Exam reveals no gallop and no friction rub.  No murmur heard.  Pulmonary/Chest: Breath sounds normal. Chest wall is not dull to percussion.   Musculoskeletal:   no edema.   Lymphadenopathy: no cervical adenopathy  Neurological: alert and oriented to person, place, and time.   Skin: left ABD dry skin.       Note: I have reviewed all pertinent labs and diagnostic tests associated with this visit with specific comments listed under the assessment and plan below    Assessment and Plan    1. Eczema  Reported improvement with prescribed moisturizer cream  Mild left abdominal dry skin on examination  Patient to continue with moisturizer cream  Follow-up in 10 weeks    2. Vaginal swelling  Patient and mother report a 1 cm vaginal swelling  She denies constitutional symptoms  No dysuria, vaginal discharge or burning on urination  Patient did not want to be examined as she is going through menstrual period and felt uncomfortable with male physician examining  Patient to follow-up next week with female physician    3. Gastroesophageal reflux disease without esophagitis  Continue PPI    4. Vitamin D deficiency  Continue vitamin D supplementation    5. Prediabetes  A1C at 6.2  Patient and mother would like to continue to try diet  Follow-up in 10 weeks    6. " Morbid obesity (HCC)  BMI still at 50  Patient and mother report improvement nonadherence to diet  Patient saw nutritionist first meal plan started  Patient and mother report increase exercise and walking  Patient gained 1 pound from last time seen  Patient stated goal to lose 5 pounds  Follow-up in 10 weeks        Followup: Return in about 1 week (around 2/11/2019) for Long.      Signed by: Kiet Torres M.D.

## 2019-02-13 ENCOUNTER — OFFICE VISIT (OUTPATIENT)
Dept: INTERNAL MEDICINE | Facility: MEDICAL CENTER | Age: 29
End: 2019-02-13
Payer: MEDICAID

## 2019-02-13 VITALS
HEIGHT: 55 IN | TEMPERATURE: 97.5 F | DIASTOLIC BLOOD PRESSURE: 91 MMHG | SYSTOLIC BLOOD PRESSURE: 146 MMHG | OXYGEN SATURATION: 96 % | BODY MASS INDEX: 52.54 KG/M2 | WEIGHT: 227 LBS | HEART RATE: 95 BPM

## 2019-02-13 DIAGNOSIS — R22.41 LUMP OF RIGHT THIGH: ICD-10-CM

## 2019-02-13 PROCEDURE — 99213 OFFICE O/P EST LOW 20 MIN: CPT | Mod: GE | Performed by: INTERNAL MEDICINE

## 2019-02-13 ASSESSMENT — PATIENT HEALTH QUESTIONNAIRE - PHQ9: CLINICAL INTERPRETATION OF PHQ2 SCORE: 0

## 2019-02-13 NOTE — PROGRESS NOTES
Established Patient    Christi presents today with the following:    CC: Nodule near vagina.    HPI: Patient is a 28 y.o female here with acute complaint of a nodule near her vagina. It has been there for about 2 months, unchanged. Patient states that it's tender and itchy, fluctuates in size based on her menstrual cycle; no vaginal discharge. Patient has no systemic symptoms. Has never had sexual intercourse.    Patient Active Problem List    Diagnosis Date Noted   • Odynophagia 02/21/2018   • Gastroesophageal reflux disease without esophagitis 07/26/2017   • Prediabetes 05/25/2016   • Vitamin D deficiency 05/25/2016   • DELIA (obstructive sleep apnea) 05/25/2016   • Morbid obesity (HCC) 05/25/2016       Current Outpatient Prescriptions   Medication Sig Dispense Refill   • vitamin D, Ergocalciferol, (DRISDOL) 33281 units Cap capsule Take 1 Cap by mouth every 7 days. 12 Cap 0   • pantoprazole (PROTONIX) 20 MG tablet Take 2 Tabs by mouth every day. 90 Tab 4     No current facility-administered medications for this visit.        ROS: As per HPI. Additional pertinent systems as noted below.  Constitutional: Negative for chills and fever.   HENT: Negative for ear pain and sore throat.    Eyes: Negative for discharge and redness.   Respiratory: Negative for cough, hemoptysis, wheezing and stridor.    Cardiovascular: Negative for chest pain, palpitations and leg swelling.   Gastrointestinal: Negative for abdominal pain, constipation, diarrhea, heartburn, nausea and vomiting.   Genitourinary: Negative for dysuria, flank pain and hematuria.   Musculoskeletal: Negative for falls and myalgias.   Skin: Negative for itching and rash.   Neurological: Negative for dizziness, seizures, loss of consciousness and headaches.   Endo/Heme/Allergies: Negative for polydipsia. Does not bruise/bleed easily.   Psychiatric/Behavioral: Negative for substance abuse and suicidal ideas.       /91 (BP Location: Right arm, Patient  "Position: Sitting, BP Cuff Size: Adult) Comment (BP Location): forarm  Pulse 95   Temp 36.4 °C (97.5 °F) (Temporal)   Ht 1.372 m (4' 6\")   Wt 103 kg (227 lb)   SpO2 96%   BMI 54.73 kg/m²     Physical Exam   Constitutional:  oriented to person, place, and time. No distress.   Eyes: Pupils are equal, round, and reactive to light. No scleral icterus.  Neck: Neck supple. No thyromegaly present.   Cardiovascular: Normal rate, regular rhythm and normal heart sounds.  Exam reveals no gallop and no friction rub.  No murmur heard.  Pulmonary/Chest: Breath sounds normal. Chest wall is not dull to percussion.   Musculoskeletal:   no edema.   Lymphadenopathy: no cervical adenopathy  Neurological: alert and oriented to person, place, and time.   Skin: No cyanosis. Nails show no clubbing.  Other: normal vulva without signs of infection or irritation. Mild hyperemia pf the labia minora. No visible swelling or lumps palpated.    Note: I have reviewed all pertinent labs and diagnostic tests associated with this visit with specific comments listed under the assessment and plan below    Assessment and Plan    1. Lump of right thigh  Patient presented with a concern of a lump on the inner thigh near the vaginal area. Exam did not reveal any abnormalities except for mild vulvar irritation.  Counseled patient on personal hygiene and advised that she try taking baths for the next 2 weeks instead of showers.  Counseled patient to return if she notices a return of the lump.      Followup: Return if symptoms worsen or fail to improve.      Signed by: Cristel Mendieta M.D.  "

## 2019-03-11 ENCOUNTER — SLEEP CENTER VISIT (OUTPATIENT)
Dept: SLEEP MEDICINE | Facility: MEDICAL CENTER | Age: 29
End: 2019-03-11
Payer: MEDICAID

## 2019-03-11 VITALS
SYSTOLIC BLOOD PRESSURE: 132 MMHG | TEMPERATURE: 98.2 F | HEIGHT: 55 IN | DIASTOLIC BLOOD PRESSURE: 76 MMHG | WEIGHT: 230 LBS | RESPIRATION RATE: 16 BRPM | HEART RATE: 67 BPM | BODY MASS INDEX: 53.23 KG/M2 | OXYGEN SATURATION: 97 %

## 2019-03-11 DIAGNOSIS — G47.33 OSA (OBSTRUCTIVE SLEEP APNEA): ICD-10-CM

## 2019-03-11 PROCEDURE — 99213 OFFICE O/P EST LOW 20 MIN: CPT | Performed by: NURSE PRACTITIONER

## 2019-03-11 NOTE — PATIENT INSTRUCTIONS
Plan:    1) Continue CPAP @ 9 CM H20.   2) Sleep hygiene discussed. Recommend keeping a set sleep/wake schedule. Logging enough hours of sleep. Limiting/Avoiding naps. No caffeine after noon and no heavy meals in the evening. Recommend daily exercise.   3) Weight loss recommended.  4) Follow up in 2 months with repeat compliance card download, sooner if needed.

## 2019-03-11 NOTE — PROGRESS NOTES
Chief Complaint   Patient presents with   • Apnea     last Seen 12/11/18       HPI:  Christi Mcgarry is a 28 y.o. year old female here today for follow-up on her obstructive sleep apnea. Sleep study in April 2018 indicated mild obstructive with an AHI of 9.4 and a low 02 of 64%. However severity of sleep apnea was felt to be underestimated due to lack of supine sleep. She was started on Auto CPAP 5-15 CM H20. However did not meet compliance and required a repeat study for re qualification. She has a history of snoring, non restorative sleep and daytime fatigue. Repeat PSG 11/26/2018 indicates mild obstructive sleep apnea with an AHI of 10.8 with low 02 of 85%. She spent 57.6% of the diagnostic portion with saturations less than 90%. She was titrated to a CPAP pressure of 9 CM H20 with a resultant AHI of 0, mean 02 of 92%, low 87% with achievement of both supine and REM sleep.   She was restarted on CPAP at a pressure of 9 CM H20 at her last office visit. Compliance card download today in the office indicates an AHI of 4.5 with an average use of 5.5 hours when she is on the machine. She has only had her machine for 21 days.   She tolerates the pressure well. She has a full face mask which she feels is a good fit. She does feel energy levels are better on therapy. She denies any morning headaches.         Past Medical History:   Diagnosis Date   • ADHD (attention deficit hyperactivity disorder)    • Chickenpox    • Headache 5/25/2016   • Insomnia 5/25/2016   • Learning disabilities    • Morbid obesity (HCC) 5/25/2016   • Nocturnal enuresis 5/25/2016   • Obesity    • DELIA (obstructive sleep apnea) 5/25/2016   • Prediabetes 5/25/2016   • Sleep apnea    • UTI (lower urinary tract infection) 5/25/2016       Past Surgical History:   Procedure Laterality Date   • TONSILLECTOMY AND ADENOIDECTOMY  4/15/2013    Performed by Nadeem Briceno M.D. at [facility]       Family History   Problem Relation Age of  "Onset   • Sleep Apnea Neg Hx        Social History     Social History   • Marital status: Single     Spouse name: N/A   • Number of children: N/A   • Years of education: N/A     Occupational History   • Not on file.     Social History Main Topics   • Smoking status: Never Smoker   • Smokeless tobacco: Never Used   • Alcohol use No   • Drug use: No   • Sexual activity: Not on file     Other Topics Concern   • Not on file     Social History Narrative   • No narrative on file       ROS:  Constitutional: Denies fevers, chills, sweats, weight loss  Eyes: Denies vision loss, pain, drainage, double vision. Wears glasses   Ears/Nose/Mouth/Throat: Denies rhinitis, nasal congestion, ear ache, difficulty hearing, sore throat, persistent hoarseness, decayed teeth/toothache  Cardiovascular: Denies chest pain, tightness, palpitations, swelling in feet/legs, fainting, difficulty breathing when laying down  Respiratory: Denies shortness of breath, cough, sputum, wheezing, painful breathing, coughing up blood  GI: Denies heartburn, difficulty swallowing, nausea, vomiting, abdominal pain, diarrhea, constipation  : Denies frequent urination, painful urination  Integumentary: Denies rashes, lumps or color changes  MSK: Denies painful joints, sore muscles, and back pain.   Neurological: Denies frequent headaches, dizziness, weakness  Sleep: See HPI       Current Outpatient Prescriptions   Medication Sig Dispense Refill   • vitamin D, Ergocalciferol, (DRISDOL) 82185 units Cap capsule Take 1 Cap by mouth every 7 days. 12 Cap 0   • pantoprazole (PROTONIX) 20 MG tablet Take 2 Tabs by mouth every day. 90 Tab 4     No current facility-administered medications for this visit.        No Known Allergies    Blood pressure 132/76, pulse 67, temperature 36.8 °C (98.2 °F), temperature source Temporal, resp. rate 16, height 1.372 m (4' 6\"), weight 104.3 kg (230 lb), SpO2 97 %, not currently breastfeeding.    PE:   Appearance: Well developed, well " nourished, no acute distress  Eyes: PERRL, EOM intact, sclera white, conjunctiva moist  Ears: no lesions or deformities  Hearing: grossly intact  Nose: no lesions or deformities  Oropharynx: tongue normal, posterior pharynx without erythema or exudate  Mallampati Classification: Class 4   Neck: supple, trachea midline, no masses   Respiratory effort: no intercostal retractions or use of accessory muscles  Lung auscultation: no rales, rhonchi or wheezes  Heart auscultation: no murmur rub or gallop  Extremities: no cyanosis or edema  Abdomen: soft ,non tender, no masses  Gait and Station: normal  Digits and nails: no clubbing, cyanosis, petechiae or nodes.  Cranial nerves: grossly intact  Skin: no rashes, lesions or ulcers noted  Orientation: Oriented to time, person and place  Mood and affect: mood and affect appropriate, normal interaction with examiner  Judgement: Intact          Assessment:    1. DELIA (obstructive sleep apnea)     2. BMI 50.0-59.9, adult (HCC)           Plan:    1) Continue CPAP @ 9 CM H20.   2) Sleep hygiene discussed. Recommend keeping a set sleep/wake schedule. Logging enough hours of sleep. Limiting/Avoiding naps. No caffeine after noon and no heavy meals in the evening. Recommend daily exercise.   3) Weight loss recommended.  4) Follow up in 2 months with repeat compliance card download, sooner if needed.

## 2019-04-15 ENCOUNTER — OFFICE VISIT (OUTPATIENT)
Dept: INTERNAL MEDICINE | Facility: MEDICAL CENTER | Age: 29
End: 2019-04-15
Payer: MEDICAID

## 2019-04-15 VITALS
BODY MASS INDEX: 54.29 KG/M2 | HEIGHT: 55 IN | WEIGHT: 234.6 LBS | HEART RATE: 74 BPM | DIASTOLIC BLOOD PRESSURE: 70 MMHG | RESPIRATION RATE: 19 BRPM | OXYGEN SATURATION: 94 % | TEMPERATURE: 97.8 F | SYSTOLIC BLOOD PRESSURE: 134 MMHG

## 2019-04-15 DIAGNOSIS — E55.9 VITAMIN D DEFICIENCY: ICD-10-CM

## 2019-04-15 DIAGNOSIS — R73.03 PREDIABETES: ICD-10-CM

## 2019-04-15 DIAGNOSIS — G47.33 OSA (OBSTRUCTIVE SLEEP APNEA): ICD-10-CM

## 2019-04-15 DIAGNOSIS — E66.01 MORBID OBESITY (HCC): ICD-10-CM

## 2019-04-15 LAB
HBA1C MFR BLD: 6.1 % (ref 0–5.6)
INT CON NEG: NEGATIVE
INT CON POS: POSITIVE

## 2019-04-15 PROCEDURE — 85018 HEMOGLOBIN: CPT | Performed by: INTERNAL MEDICINE

## 2019-04-15 PROCEDURE — 99213 OFFICE O/P EST LOW 20 MIN: CPT | Mod: GE | Performed by: INTERNAL MEDICINE

## 2019-04-15 PROCEDURE — 83036 HEMOGLOBIN GLYCOSYLATED A1C: CPT | Mod: GC | Performed by: INTERNAL MEDICINE

## 2019-04-15 ASSESSMENT — PATIENT HEALTH QUESTIONNAIRE - PHQ9: CLINICAL INTERPRETATION OF PHQ2 SCORE: 0

## 2019-04-15 ASSESSMENT — PAIN SCALES - GENERAL: PAINLEVEL: NO PAIN

## 2019-04-15 NOTE — PROGRESS NOTES
Established Patient    Christi presents today with the following:    CC: Morbid obesity follow-up    HPI: 28-year-old Morbidly obeast female with a past medical history of morbid obesity, prediabetes, GERD, DELIA on CPAP followed by pulmonology, and vitamin D deficiency on supplementation comes to clinic for routine follow up.   present during interview.  Patient gained 4 pounds since last time we met.  Mother reports dietary and exercise noncompliance.  Patient has not care for dieting.  Mother reported no week history of sore throat for which the patient reports resolution of such.  Discussed importance of losing weight relative to health and preventing diabetes, osteoarthritis, and atherosclerotic disease in the future.  Patient and mother understood and reported that they will work on losing weight.  Goal to lose 4 pounds by next time we meet. No chest pain, dyspnea, palpitations, dizziness or LOC.         Patient Active Problem List    Diagnosis Date Noted   • Odynophagia 02/21/2018   • Gastroesophageal reflux disease without esophagitis 07/26/2017   • Prediabetes 05/25/2016   • Vitamin D deficiency 05/25/2016   • DELIA (obstructive sleep apnea) 05/25/2016   • Morbid obesity (HCC) 05/25/2016       Current Outpatient Prescriptions   Medication Sig Dispense Refill   • vitamin D, Ergocalciferol, (DRISDOL) 03393 units Cap capsule Take 1 Cap by mouth every 7 days. 12 Cap 0   • pantoprazole (PROTONIX) 20 MG tablet Take 2 Tabs by mouth every day. 90 Tab 4     No current facility-administered medications for this visit.        ROS: As per HPI. Additional pertinent symptoms as noted below.  Constitutional: no fevers, chills, weight change  Eyes: no blurred vision, discharge, eye pain  ENT: no rhinorrhea, sore throat, neck masses  Cardiovascular: no angina, palpitations, PND, orthopnea, edema  Respiratory: no cough, sputum, or dyspnea  GI: no nausea, vomiting, abdominal pain, constipation, or diarrhea  :  "no dysuria, hematuria, frequency   Musculo-skeletal: no joint or muscle pain  Skin: no rashes or open wounds  Neurological: no headaches, dizziness, motor/sensory loss  Psychological: no anxiety or depression      /70 (BP Location: Left arm, Patient Position: Sitting, BP Cuff Size: Adult long)   Pulse 74   Temp 36.6 °C (97.8 °F) (Temporal)   Resp 19   Ht 1.372 m (4' 6\")   Wt 106.4 kg (234 lb 9.6 oz)   LMP 03/30/2019 (Approximate)   SpO2 94%   Breastfeeding? No   BMI 56.56 kg/m²     Physical Exam   Constitutional:  oriented to person, place, and time. No distress.   Eyes: Pupils are equal, round, and reactive to light. No scleral icterus.  Neck: Neck supple. No thyromegaly present.   Cardiovascular: Normal rate, regular rhythm and normal heart sounds.  Exam reveals no gallop and no friction rub.  No murmur heard.  Pulmonary/Chest: Breath sounds normal. Chest wall is not dull to percussion.   Musculoskeletal:   no edema.   Lymphadenopathy: no cervical adenopathy  Neurological: alert and oriented to person, place, and time.   Skin: No cyanosis. Nails show no clubbing.      Note: I have reviewed all pertinent labs and diagnostic tests associated with this visit with specific comments listed under the assessment and plan below    Assessment and Plan    1. Morbid obesity (HCC)  Gained 4 pounds within the last 5 weeks  Patient not interested in losing weight  Mother reports poor exercise motivation  Sneaks meals and eats better she wants  Goal to lose 4 pounds plan  Follow-up in 15 weeks    2. Prediabetes  A1c at 6.1  Goal to lose 4 pounds plan  Follow-up in 15 weeks    3. Vitamin D deficiency  Continue vitamin D supplementation    4. DELIA (obstructive sleep apnea)  Mother reports occasional CPAP compliance  Discussed importance of CPAP machine use and daily use  Follow-up in 15 weeks    Followup: Return in about 15 weeks (around 7/29/2019) for Long.      Signed by: Kiet Torres M.D.    "

## 2019-04-24 DIAGNOSIS — E55.9 VITAMIN D DEFICIENCY: ICD-10-CM

## 2019-04-24 RX ORDER — ERGOCALCIFEROL 1.25 MG/1
CAPSULE ORAL
Qty: 12 CAP | Refills: 0 | Status: SHIPPED | OUTPATIENT
Start: 2019-04-24 | End: 2021-12-03

## 2019-04-24 NOTE — TELEPHONE ENCOUNTER
Last seen: 04/15/19 by Dr. Rivera  Next appt: 07/29/19 with Dr. Rivera    Was the patient seen in the last year in this department? Yes   Does patient have an active prescription for medications requested? No   Received Request Via: Pharmacy

## 2019-05-13 ENCOUNTER — APPOINTMENT (OUTPATIENT)
Dept: SLEEP MEDICINE | Facility: MEDICAL CENTER | Age: 29
End: 2019-05-13
Payer: MEDICAID

## 2019-05-23 NOTE — MR AVS SNAPSHOT
"        Christi Mcgarry   2017 3:00 PM   Office Visit   MRN: [id number]    Department:  [facility] Med - Internal Med   Dept Phone:  [phone]    Description:  Female : 1990   Provider:  Luzma Thomas M.D.           Reason for Visit     Orders Needed Labs-not done previously     Referral Needed New sleep Dr.- Rick retired     Vaginitis C/o itching, odorous discharge-doesn't want exam.       Allergies as of 2017     No Known Allergies      You were diagnosed with     DELIA (obstructive sleep apnea)   [592114]       Prediabetes   [228762]       Vitamin D deficiency   [4705079]       Morbid obesity, unspecified obesity type (CMS-HCC)   [3799927]       Vaginal discharge   [184540]       Healthcare maintenance   [004490]       Bacterial vaginosis   [652789]         Vital Signs     Blood Pressure Pulse Temperature Height Weight Body Mass Index    136/84 mmHg 85 36.3 °C (97.4 °F) 1.429 m (4' 8.26\") 101.969 kg (224 lb 12.8 oz) 49.93 kg/m2    Oxygen Saturation Smoking Status                98% Never Smoker           Basic Information     Date Of Birth Sex Race Ethnicity Preferred Language Language for Written Material    1990 Female  or   Origin (Bel[place]ian,Scottish,Z[place]ian,[place], etc) English Belarusian      Your appointments     2017  1:15 PM   Established Patient with Luzma Thomas M.D.   [facility] / [facility] Med - Internal Medicine (--)    [address] [place] [id number] [phone]           You will be receiving a confirmation call a few days before your appointment from our automated call confirmation system.              Problem List              ICD-10-CM Priority Class Noted - Resolved    Prediabetes R73.03   2016 - Present    Vitamin D deficiency E55.9   2016 - Present    Low serum HDL R74.8   2016 - Present    Hypertriglyceridemia E78.1   2016 - Present    DELIA (obstructive sleep apnea) G47.33   2016 " - Present    Morbid obesity (CMS-[facility]) E66.01   5/25/2016 - Present      Health Maintenance        Date Due Completion Dates    IMM VARICELLA (CHICKENPOX) VACCINE (1 of 2 - 2 Dose Adolescent Series) 11/13/2004 ---    PAP SMEAR 11/13/2012 ---    IMM DTaP/Tdap/Td Vaccine (7 - Td) 4/28/2016 4/28/2006, 4/23/1996, 7/20/1993, 2/25/1993, 9/11/1992, 2/14/1992    IMM INFLUENZA (1) 9/1/2016 12/3/2015            Current Immunizations     Dtap Vaccine 4/23/1996, 7/20/1993, 2/25/1993, 9/11/1992, 2/14/1992    HIB Vaccine(PEDVAX) 7/20/1993, 9/11/1992, 2/14/1992    HPV Quadrivalent Vaccine (GARDASIL) 10/28/2009, 7/10/2008, 1/24/2008    Hepatitis A Vaccine, Ped/Adol 1/24/2008, 4/28/2006    Hepatitis B Vaccine Non-Recombivax (Ped/Adol) 12/15/1998, 3/19/1998, 6/17/1997    Influenza Vaccine Quad Inj (Pf) 12/3/2015    MMR Vaccine 4/23/1996, 2/25/1993    Meningococcal Conjugate Vaccine MCV4 (Menactra) 1/24/2008    OPV - Historical Data 9/9/1996, 7/20/1993, 9/11/1992, 2/14/1992    Tdap Vaccine 4/28/2006      Below and/or attached are the medications your provider expects you to take. Review all of your home medications and newly ordered medications with your provider and/or pharmacist. Follow medication instructions as directed by your provider and/or pharmacist. Please keep your medication list with you and share with your provider. Update the information when medications are discontinued, doses are changed, or new medications (including over-the-counter products) are added; and carry medication information at all times in the event of emergency situations     Allergies:  No Known Allergies          Medications  Valid as of: February 22, 2017 -  4:13 PM    Generic Name Brand Name Tablet Size Instructions for use    Ergocalciferol (Cap) DRISDOL 93105 UNITS Take 1 Cap by mouth every 7 days.        Methylphenidate HCl (Tab CR) METADATE ER 20 MG Take  by mouth.        MetroNIDAZOLE (Tab) FLAGYL 500 MG Take 1 Tab by mouth 2 Times a Day.         Topiramate (Tab) TOPAMAX 25 MG Take 25 mg by mouth 2 times a day.        .                 Medicines prescribed today were sent to:     [facility] #[id number] - [place], [place] - [address]    [address] [place] [address]    Phone: [phone] Fax: [phone]    Open 24 Hours?: No      Medication refill instructions:       If your prescription bottle indicates you have medication refills left, it is not necessary to call your provider’s office. Please contact your pharmacy and they will refill your medication.    If your prescription bottle indicates you do not have any refills left, you may request refills at any time through one of the following ways: The online [employer] system (except Urgent Care), by calling your provider’s office, or by asking your pharmacy to contact your provider’s office with a refill request. Medication refills are processed only during regular business hours and may not be available until the next business day. Your provider may request additional information or to have a follow-up visit with you prior to refilling your medication.   *Please Note: Medication refills are assigned a new Rx number when refilled electronically. Your pharmacy may indicate that no refills were authorized even though a new prescription for the same medication is available at the pharmacy. Please request the medicine by name with the pharmacy before contacting your provider for a refill.        Your To Do List     Future Labs/Procedures Complete By Expires    CHLAMYDIA/GC PCR URINE OR SWAB  As directed 2/22/2018    TRICHOMONAS VAGINALIS BY TMA  As directed 2/22/2018      Referral     A referral request has been sent to our patient care coordination department. Please allow 3-5 business days for us to process this request and contact you either by phone or mail. If you do not hear from us by the 5th business day, please call us at [phone].           [employer] Status: Patient  Declined         Sven Guzman)  Orthopaedic Surgery Surgery  [address] [place] [address]  Phone: [phone]  Fax: [phone]  Follow Up Time: Sven Guzman)  Orthopaedic Surgery Surgery  [address] [place] [address]  Phone: [phone]  Fax: [phone]  Follow Up Time: 2 weeks

## 2019-06-13 ENCOUNTER — APPOINTMENT (OUTPATIENT)
Dept: SLEEP MEDICINE | Facility: MEDICAL CENTER | Age: 29
End: 2019-06-13
Payer: MEDICAID

## 2019-07-30 ENCOUNTER — SLEEP CENTER VISIT (OUTPATIENT)
Dept: SLEEP MEDICINE | Facility: MEDICAL CENTER | Age: 29
End: 2019-07-30
Payer: MEDICAID

## 2019-07-30 VITALS
DIASTOLIC BLOOD PRESSURE: 84 MMHG | WEIGHT: 233 LBS | BODY MASS INDEX: 53.92 KG/M2 | HEART RATE: 78 BPM | SYSTOLIC BLOOD PRESSURE: 128 MMHG | OXYGEN SATURATION: 95 % | HEIGHT: 55 IN | RESPIRATION RATE: 15 BRPM

## 2019-07-30 DIAGNOSIS — G47.33 OSA (OBSTRUCTIVE SLEEP APNEA): ICD-10-CM

## 2019-07-30 PROCEDURE — 99213 OFFICE O/P EST LOW 20 MIN: CPT | Performed by: FAMILY MEDICINE

## 2019-07-30 NOTE — PROGRESS NOTES
[facility] Follow Up Note     Date: 7/30/2019 / Time: 10:35 AM    Patient ID:   Name:             Christi Mcgarry   YOB: 1990  Age:                 28 y.o.  female   MRN:               [id number]      Thank you for requesting a sleep medicine consultation on Christi Mcgarry at the sleep center. She presents today with the chief complaints of DELIA follow up. She is accompanied by her .    HISTORY OF PRESENT ILLNESS:       Pt is currently not on CPAP. As per the DME they did not receive the office notes and since there was no proof of compliance, she had to return the CPAP. She has been off of treatment for last 3 months. She goes to sleep around 3 pm and wakes up around 7:30 am. She is getting about 4 hrs of sleep on a good night. She has trouble falling asleep and it can take up to 1-2 hrs. The bad nights are most nights per week. Overall, she doesnot finds her sleep refreshing. When She  wakes up in the morning, She does feel tired. She  Does take regular naps. The naps are usually 5 min long. The symptoms of excessive daytime, snoring and gasping has relapsed.     SLEEP HISTORY   Sleep study in April 2018 indicated mild obstructive with an AHI of 9.4 and a low 02 of 64%.    Repeat PSG 11/26/2018 indicates mild obstructive sleep apnea with an AHI of 10.8 with low 02 of 85%. She spent 57.6% of the diagnostic portion with saturations less than 90%. She was titrated to a CPAP pressure of 9 CM H20 with a resultant AHI of 0, mean 02 of 92%, low 87% with achievement of both supine and REM sleep.     REVIEW OF SYSTEMS:       Constitutional: Denies fevers, Denies weight changes  Eyes: Denies changes in vision, no eye pain  Ears/Nose/Throat/Mouth: Denies nasal congestion or sore throat   Cardiovascular: Denies chest pain or palpitations   Respiratory: Denies shortness of breath , Denies cough  Gastrointestinal/Hepatic: Denies abdominal pain, nausea, vomiting, diarrhea,  "constipation or GI bleeding   Genitourinary: Deniesdysuria or frequency  Musculoskeletal/Rheum: Denies  joint pain and swelling   Skin/Breast: Denies rash,   Neurological: Denies headache, confusion, memory loss or focal weakness/parasthesias  Psychiatric: denies mood disorder   Sleep: + snoring     Comprehensive review of systems form is reviewed with the patient and is attached in the EMR.     PMH:  has a past medical history of ADHD (attention deficit hyperactivity disorder); Chickenpox; Headache (5/25/2016); Insomnia (5/25/2016); Learning disabilities; Morbid obesity (HCC) (5/25/2016); Nocturnal enuresis (5/25/2016); Obesity; DELIA (obstructive sleep apnea) (5/25/2016); Prediabetes (5/25/2016); Sleep apnea; and UTI (lower urinary tract infection) (5/25/2016). She also has no past medical history of Diabetes (HCC).  MEDS:   Current Outpatient Prescriptions:   •  vitamin D, Ergocalciferol, (DRISDOL) 17735 units Cap capsule, TOME CELIO CAPSULA POR VIA ORAL EVERY 7 DAYS, Disp: 12 Cap, Rfl: 0  •  pantoprazole (PROTONIX) 20 MG tablet, Take 2 Tabs by mouth every day., Disp: 90 Tab, Rfl: 4  ALLERGIES: No Known Allergies  SURGHX:   Past Surgical History:   Procedure Laterality Date   • TONSILLECTOMY AND ADENOIDECTOMY  4/15/2013    Performed by Nadeem Briceno M.D. at SURGERY SAME DAY [facility]     SOCHX:  reports that she has never smoked. She has never used smokeless tobacco. She reports that she does not drink alcohol or use drugs..  FH:   Family History   Problem Relation Age of Onset   • Sleep Apnea Neg Hx          Physical Exam:  Vitals/ General Appearance:   Weight/BMI: Body mass index is 56.18 kg/m².  /84 (BP Location: Right arm, Patient Position: Sitting, BP Cuff Size: Adult)   Pulse 78   Resp 15   Ht 1.372 m (4' 6\")   Wt 105.7 kg (233 lb)   SpO2 95%   Vitals:    07/30/19 1017   BP: 128/84   BP Location: Right arm   Patient Position: Sitting   BP Cuff Size: Adult   Pulse: 78   Resp: 15   SpO2: 95% " "  Weight: 105.7 kg (233 lb)   Height: 1.372 m (4' 6\")       Pt. is alert and oriented to time, place and person. Cooperative and in no apparent distress.       -Head: Atraumatic, normocephalic.   -. Nose: No inferior turbinate hypertophy,   -. Throat: Oropharynx appears crowded in that the palate is overhanging   -. Neck: Supple. No thyromegaly  -. Chest: Trachea central, no spine deformity   -. Lungs auscultation: B/L good air entry, vesicular breath sounds, no adventitious sounds  -. Heart auscultation: 1st and 2nd heart sounds normal, regular rhythm. No appreciable murmur.  - Extremities: no pedal edema.  - Skin: No rash  - NEUROLOGICAL EXAMINATION: On neurological exam, the patient was alert and oriented x3. speech was clear and fluent without dysarthria.      ASSESSMENT AND PLAN     1.Sleep Apnea .     The pathophysiology of sleep anea and the increased risk of cardiovascular morbidity from untreated sleep apnea is discussed in detail with the patient.   She is urged to avoid supine sleep, weight gain and alcoholic beverages since all of these can worsen sleep apnea. She is cautioned against drowsy driving. If She feels sleepy while driving, She must pull over for a break/nap, rather than persist on the road, in the interest of She own safety and that of others on the road.   Plan   - Pt would like to restart PAP therapy because she was benifting from the treatment. New ACPAP 5-15 cm Small Quattro Air    is ordered today    - F/u in 8-10 weeks to assess the efficiacy of recommended pressure    - SS was reviewed and discussed with the pt   - compliance was reinforced     2. Regarding treatment of other past medical problems and general health maintenance,  She is urged to follow up with PCP.      "

## 2019-07-30 NOTE — PATIENT INSTRUCTIONS
" Pt would like to restart PAP therapy. New ACPAP 5-15 cm Small Quattro Air is ordered today . The order will be sent to [facility]. Recommended to use at least 4 hrs per night on every single night. F/U in 2 months for 1st compliance download.       CPAP and BIPAP Information  CPAP and BIPAP are methods of helping you breathe with the use of air pressure. CPAP stands for \"continuous positive airway pressure.\" BIPAP stands for \"bi-level positive airway pressure.\" In both methods, air is blown into your air passages to help keep you breathing well. With CPAP, the amount of pressure stays the same while you breathe in and out. CPAP is most commonly used for obstructive sleep apnea. For obstructive sleep apnea, CPAP works by holding your airways open so that they do not collapse when your muscles relax during sleep. BIPAP is similar to CPAP except the amount of pressure is increased when you inhale. This helps you take larger breaths. Your health care provider will recommend whether CPAP or BIPAP would be more helpful for you.   WHY ARE CPAP AND BIPAP TREATMENTS USED?  CPAP or BIPAP can be helpful if you have:   · Sleep apnea.    · Chronic obstructive pulmonary disease (COPD).    · Diseases that weaken the muscles of the chest, including muscular dystrophy or neurological diseases such as amyotrophic lateral sclerosis (ALS).  · Other problems that cause breathing to be weak, abnormal, or difficult.    HOW IS CPAP OR BIPAP ADMINISTERED?  Both CPAP and BIPAP are provided by a small machine with a flexible plastic tube that attaches to a plastic mask. The mask fits on your face, and air is blown into your air passages through your nose or mouth. The amount of pressure that is used to blow the air into your air passages can be set on the machine. Your health care provider will determine the pressure setting that should be used based on your individual needs.  WHEN SHOULD CPAP OR BIPAP BE USED?  In most cases, the mask is " worn only when sleeping. Generally, you will need to wear the mask throughout the night and during the daytime if you take a nap. In a few cases involving certain medical conditions, people also need to wear the mask at other times when they are awake. Follow your health care provider's instructions for when to use the machine.   USING THE MASK  · Because the mask needs to be snug, some people feel a trapped or closed-in feeling (claustrophobic) when first using the mask. You may need to get used to the mask gradually. To do this, you can first hold the mask loosely over your nose or mouth. Gradually apply the mask more snugly. You can also gradually increase the amount of time that you use the mask.  · Masks are available in various types and sizes. Some fit over your mouth and nose, and some fit over just your nose. If your mask does not fit well, talk to your health care provider about getting a different one.  · If you are using a nasal mask and you tend to breathe through your mouth, a chin strap may be applied to help keep your mouth closed.    · The CPAP and BIPAP machines have alarms that may sound if the mask comes off or develops a leak.  · If you have trouble with the mask, it is very important that you talk to your health care provider about finding a way to make the mask easier to tolerate. Do not stop using the mask. This could have a negative impact on your health.  TIPS FOR USING THE MACHINE  · Place your CPAP or BIPAP machine on a secure table or stand near an electrical outlet.    · Know where the on-off switch is located on the machine.  · Follow your health care provider's instructions for how to set the pressure on your machine and when you should use it.  · Do not eat or drink while the CPAP or BIPAP machine is on. Food or fluids could get pushed into your lungs by the pressure of the CPAP or BIPAP.  · Do not smoke. Tobacco smoke residue can damage the machine.    · For home use, CPAP and BIPAP  machines can be rented or purchased through home health care companies. Many different brands of machines are available. Renting a machine before purchasing may help you find out which particular machine works well for you.  SEEK IMMEDIATE MEDICAL CARE IF:  · You have redness or open areas around your nose or mouth where the mask fits.    · You have trouble operating the CPAP or BIPAP machine.    · You cannot tolerate wearing the CPAP or BIPAP mask.    This information is not intended to replace advice given to you by your health care provider. Make sure you discuss any questions you have with your health care provider.  Document Released: 09/15/2005 Document Revised: 01/08/2016 Document Reviewed: 07/17/2014  Elsevier Interactive Patient Education © 2017 [employer]

## 2019-08-02 ENCOUNTER — TELEPHONE (OUTPATIENT)
Dept: PULMONOLOGY | Facility: HOSPICE | Age: 29
End: 2019-08-02

## 2019-08-02 DIAGNOSIS — G47.33 OSA (OBSTRUCTIVE SLEEP APNEA): ICD-10-CM

## 2019-10-22 ENCOUNTER — TELEPHONE (OUTPATIENT)
Dept: PULMONOLOGY | Facility: HOSPICE | Age: 29
End: 2019-10-22

## 2019-10-22 NOTE — TELEPHONE ENCOUNTER
Miley from Disability called and LM statting if Dr. Schultz can write a letter stating why this pt's CPAP is not working, or why is she not benefiting from the CPAP

## 2019-10-24 NOTE — TELEPHONE ENCOUNTER
Per Dr. Schultz It's not that it's not working or not benefiting  But it was DME who told the pt that they do not have enough documentation therefore she had to return the CPAP machine. Can you please call her back and ask for more details.     I attempted to contact the pt but unfortunately I was not able reach but spoke to her mother advising her to inform the pt to give us a call back.

## 2019-10-24 NOTE — TELEPHONE ENCOUNTER
I received a call from Miley Beaver regarding the pt cpap. I informed her of the pt situation with the pt having to return her cpap machine because she was not compliant with using it. I informed her that I will contact the DME that the new order for cpap was fax to see what is going and I will speak to jony regarding the pt to see what we can do for her.

## 2019-10-28 NOTE — TELEPHONE ENCOUNTER
Looks like we tried to reorder the machine back in August but I have no idea what the outcome was but I am guessing either [facility] dropped the ball or it was denied  At this time they are not answering our phone calls so I am unsure  I would suggest sending the order and notes to another DME that is better at communication with our office

## 2019-10-31 NOTE — TELEPHONE ENCOUNTER
I called the pt to ask if it was okay if we switch her DME company to [facility] instead of [facility] because they are not answering our calls. The pt is agreed on the changing and advise her to contact us if she doesn't hear from [facility] within a few weeks.

## 2020-01-30 ENCOUNTER — SLEEP CENTER VISIT (OUTPATIENT)
Dept: SLEEP MEDICINE | Facility: MEDICAL CENTER | Age: 30
End: 2020-01-30
Payer: MEDICAID

## 2020-01-30 VITALS
RESPIRATION RATE: 16 BRPM | WEIGHT: 253 LBS | HEART RATE: 77 BPM | OXYGEN SATURATION: 98 % | BODY MASS INDEX: 58.55 KG/M2 | DIASTOLIC BLOOD PRESSURE: 76 MMHG | SYSTOLIC BLOOD PRESSURE: 120 MMHG | HEIGHT: 55 IN

## 2020-01-30 DIAGNOSIS — G47.33 OSA (OBSTRUCTIVE SLEEP APNEA): ICD-10-CM

## 2020-03-11 NOTE — PROGRESS NOTES
1st compliance check since obtaining new device    Last OV 7/30/19 Dr. Schultz    Pt is currently not on CPAP. As per the DME they did not receive the office notes and since there was no proof of compliance, she had to return the CPAP. She has been off of treatment for last 3 months. She goes to sleep around 3 pm and wakes up around 7:30 am. She is getting about 4 hrs of sleep on a good night. She has trouble falling asleep and it can take up to 1-2 hrs. The bad nights are most nights per week. Overall, she doesnot finds her sleep refreshing. When She  wakes up in the morning, She does feel tired. She  Does take regular naps. The naps are usually 5 min long. The symptoms of excessive daytime, snoring and gasping has relapsed.      SLEEP HISTORY   Sleep study in April 2018 indicated mild obstructive with an AHI of 9.4 and a low 02 of 64%.     Repeat PSG 11/26/2018 indicates mild obstructive sleep apnea with an AHI of 10.8 with low 02 of 85%. She spent 57.6% of the diagnostic portion with saturations less than 90%. She was titrated to a CPAP pressure of 9 CM H20 with a resultant AHI of 0, mean 02 of 92%, low 87% with achievement of both supine and REM sleep.        ASSESSMENT AND PLAN     1.Sleep Apnea .     The pathophysiology of sleep anea and the increased risk of cardiovascular morbidity from untreated sleep apnea is discussed in detail with the patient.   She is urged to avoid supine sleep, weight gain and alcoholic beverages since all of these can worsen sleep apnea. She is cautioned against drowsy driving. If She feels sleepy while driving, She must pull over for a break/nap, rather than persist on the road, in the interest of She own safety and that of others on the road.              Plan              - Pt would like to restart PAP therapy because she was benifting from the treatment. New ACPAP 5-15 cm Small Quattro Air    is ordered today               - F/u in 8-10 weeks to assess the efficiacy of recommended  pressure               - SS was reviewed and discussed with the pt              - compliance was reinforced      2. Regarding treatment of other past medical problems and general health maintenance,  She is urged to follow up with PCP.

## 2020-03-12 ENCOUNTER — OFFICE VISIT (OUTPATIENT)
Dept: PULMONOLOGY | Facility: HOSPICE | Age: 30
End: 2020-03-12
Payer: MEDICAID

## 2020-03-12 VITALS
SYSTOLIC BLOOD PRESSURE: 130 MMHG | WEIGHT: 236 LBS | RESPIRATION RATE: 16 BRPM | DIASTOLIC BLOOD PRESSURE: 70 MMHG | HEIGHT: 55 IN | HEART RATE: 73 BPM | BODY MASS INDEX: 54.62 KG/M2 | OXYGEN SATURATION: 95 %

## 2020-03-12 DIAGNOSIS — E66.01 MORBID OBESITY (HCC): ICD-10-CM

## 2020-03-12 DIAGNOSIS — G47.33 OSA (OBSTRUCTIVE SLEEP APNEA): ICD-10-CM

## 2020-03-12 DIAGNOSIS — K21.9 GASTROESOPHAGEAL REFLUX DISEASE WITHOUT ESOPHAGITIS: ICD-10-CM

## 2020-03-12 DIAGNOSIS — Z78.9 NONSMOKER: ICD-10-CM

## 2020-03-12 PROCEDURE — 99213 OFFICE O/P EST LOW 20 MIN: CPT | Performed by: NURSE PRACTITIONER

## 2020-03-12 NOTE — LETTER
LENIN Benavidez  [facility]   [address] [place] [address] [place] [address]  Phone: [phone] - Fax: [phone]           Encounter Date: 3/12/2020  Provider: LENIN Benavidez  Location of Care: [facility] PULMONARY MEDICINE      Patient:   Christi Mcgarry   MR Number: [id number]   YOB: 1990     PROGRESS NOTE:  Chief Complaint   Patient presents with   • Follow-Up     Last Seen 7/30/19       HPI:  Christi Mcgarry is a 29 y.o. year old female here today for follow-up on DELIA.  She is accompanied by her .  1st compliance check since obtaining new device    Last OV 7/30/19 Dr. Schultz    Sleep study in April 2018 indicated mild obstructive with an AHI of 9.4 and a low 02 of 64%.     Repeat PSG 11/26/2018 indicates mild obstructive sleep apnea with an AHI of 10.8 with low 02 of 85%. She spent 57.6% of the diagnostic portion with saturations less than 90%. She was titrated to a CPAP pressure of 9 CM H20 with a resultant AHI of 0, mean 02 of 92%, low 87% with achievement of both supine and REM sleep.   She is currently using auto CPAP 5 to 15 cm nightly.  Compliance report 2/11/2020 through 3/11/2020 notes 93.3% compliance, average nightly use of 6 hours 43 minutes, mean pressure 10.7 cm, moderate mask leak of 11 minutes per night with an overall AHI of 2.6/h.  I reviewed finds with patient.  Reinforced consistent nightly use.  She feels she sleeps well on therapy.  She sleeps through the night denies persistent morning headaches.  She feels energy levels through the day are consistent.  She is not napping or dozing off.  He denies cardiac or respiratory symptoms.  She would like to lose weight.  BMI 56.      ROS: As per HPI and otherwise negative if not stated.    Past Medical History:   Diagnosis Date   • ADHD (attention deficit hyperactivity disorder)    • Chickenpox    • Headache 5/25/2016   • Insomnia 5/25/2016    • Learning disabilities    • Morbid obesity (HCC) 5/25/2016   • Nocturnal enuresis 5/25/2016   • Obesity    • DELIA (obstructive sleep apnea) 5/25/2016   • Prediabetes 5/25/2016   • Sleep apnea    • UTI (lower urinary tract infection) 5/25/2016       Past Surgical History:   Procedure Laterality Date   • TONSILLECTOMY AND ADENOIDECTOMY  4/15/2013    Performed by Nadeem Briceno M.D. at [facility]       Family History   Problem Relation Age of Onset   • Sleep Apnea Neg Hx        Social History     Socioeconomic History   • Marital status: Single     Spouse name: Not on file   • Number of children: Not on file   • Years of education: Not on file   • Highest education level: Not on file   Occupational History   • Not on file   Social Needs   • Financial resource strain: Not on file   • Food insecurity     Worry: Not on file     Inability: Not on file   • Transportation needs     Medical: Not on file     Non-medical: Not on file   Tobacco Use   • Smoking status: Never Smoker   • Smokeless tobacco: Never Used   Substance and Sexual Activity   • Alcohol use: No   • Drug use: No   • Sexual activity: Not on file   Lifestyle   • Physical activity     Days per week: Not on file     Minutes per session: Not on file   • Stress: Not on file   Relationships   • Social connections     Talks on phone: Not on file     Gets together: Not on file     Attends Advent service: Not on file     Active member of club or organization: Not on file     Attends meetings of clubs or organizations: Not on file     Relationship status: Not on file   • Intimate partner violence     Fear of current or ex partner: Not on file     Emotionally abused: Not on file     Physically abused: Not on file     Forced sexual activity: Not on file   Other Topics Concern   • Not on file   Social History Narrative   • Not on file       Allergies as of 03/12/2020   • (No Known Allergies)        Vitals:  /70 (BP Location: Left arm,  "Patient Position: Sitting, BP Cuff Size: Adult)   Pulse 73   Resp 16   Ht 1.372 m (4' 6\")   Wt 107 kg (236 lb)   SpO2 95%     Current medications as of today   Current Outpatient Medications   Medication Sig Dispense Refill   • vitamin D, Ergocalciferol, (DRISDOL) 34001 units Cap capsule TOME CELIO CAPSULA POR VIA ORAL EVERY 7 DAYS 12 Cap 0   • pantoprazole (PROTONIX) 20 MG tablet Take 2 Tabs by mouth every day. 90 Tab 4     No current facility-administered medications for this visit.          Physical Exam:   Gen:           Alert and oriented, No apparent distress. Mood and affect appropriate, normal interaction with examiner.  Eyes:          PERRL, EOM intact, sclere white, conjunctive moist.  Ears:          Not examined.   Hearing:     Grossly intact.  Nose:          Normal, no lesions or deformities.  Dentition:    Good dentition.  Oropharynx:   Tongue normal, posterior pharynx without erythema or exudate.  Mallampati Classification: 3  Neck:        Supple, trachea midline, no masses.  Respiratory Effort: No intercostal retractions or use of accessory muscles.   Lung Auscultation:      Clear to auscultation bilaterally; no rales, rhonchi or wheezing.  CV:            Regular rate and rhythm. No murmurs, rubs or gallops.  Abd:           Not examined.   Lymphadenopathy: Not examined.  Gait and Station: Normal.  Digits and Nails: No clubbing, cyanosis, petechiae, or nodes.   Cranial Nerves: II-XII grossly intact.  Skin:        No rashes, lesions or ulcers noted.               Ext:           No cyanosis or edema.      Assessment:  1. DELIA (obstructive sleep apnea)     2. Morbid obesity (HCC)  Height And Weight   3. Gastroesophageal reflux disease without esophagitis     4. Nonsmoker         Immunizations:    Flu:recommend  Pneumovax 23:not due  Prevnar 13:not due    Plan:  1.  DELIA is clinically stable.  Continue auto CPAP nightly.  DME mask/supplies.  2.  Discussed sleep hygiene.  3.  Encouraged weight loss.  4.  " Continue daily PPI.  Reviewed reflux precautions.  5.  Follow-up in 1 year compliance report, sooner if needed.    Please note that this dictation was created using voice recognition software. I have made every reasonable attempt to correct obvious errors, but it is possible there are errors of grammar and possibly content that I did not discover before finalizing the note.      Electronically signed by LENIN Benavidez  on 03/12/20    Kiet Torres M.D.  [address] [place] [id number]  VIA Facsimile: [phone]

## 2020-03-12 NOTE — PROGRESS NOTES
Chief Complaint   Patient presents with   • Follow-Up     Last Seen 7/30/19       HPI:  Christi Mcgarry is a 29 y.o. year old female here today for follow-up on DELIA.  She is accompanied by her .  1st compliance check since obtaining new device    Last OV 7/30/19 Dr. Schultz    Sleep study in April 2018 indicated mild obstructive with an AHI of 9.4 and a low 02 of 64%.     Repeat PSG 11/26/2018 indicates mild obstructive sleep apnea with an AHI of 10.8 with low 02 of 85%. She spent 57.6% of the diagnostic portion with saturations less than 90%. She was titrated to a CPAP pressure of 9 CM H20 with a resultant AHI of 0, mean 02 of 92%, low 87% with achievement of both supine and REM sleep.   She is currently using auto CPAP 5 to 15 cm nightly.  Compliance report 2/11/2020 through 3/11/2020 notes 93.3% compliance, average nightly use of 6 hours 43 minutes, mean pressure 10.7 cm, moderate mask leak of 11 minutes per night with an overall AHI of 2.6/h.  I reviewed finds with patient.  Reinforced consistent nightly use.  She feels she sleeps well on therapy.  She sleeps through the night denies persistent morning headaches.  She feels energy levels through the day are consistent.  She is not napping or dozing off.  He denies cardiac or respiratory symptoms.  She would like to lose weight.  BMI 56.      ROS: As per HPI and otherwise negative if not stated.    Past Medical History:   Diagnosis Date   • ADHD (attention deficit hyperactivity disorder)    • Chickenpox    • Headache 5/25/2016   • Insomnia 5/25/2016   • Learning disabilities    • Morbid obesity (HCC) 5/25/2016   • Nocturnal enuresis 5/25/2016   • Obesity    • DELIA (obstructive sleep apnea) 5/25/2016   • Prediabetes 5/25/2016   • Sleep apnea    • UTI (lower urinary tract infection) 5/25/2016       Past Surgical History:   Procedure Laterality Date   • TONSILLECTOMY AND ADENOIDECTOMY  4/15/2013    Performed by Nadeem Briceno M.D. at [facility]       Family History   Problem Relation Age of Onset   • Sleep Apnea Neg Hx        Social History     Socioeconomic History   • Marital status: Single     Spouse name: Not on file   • Number of children: Not on file   • Years of education: Not on file   • Highest education level: Not on file   Occupational History   • Not on file   Social Needs   • Financial resource strain: Not on file   • Food insecurity     Worry: Not on file     Inability: Not on file   • Transportation needs     Medical: Not on file     Non-medical: Not on file   Tobacco Use   • Smoking status: Never Smoker   • Smokeless tobacco: Never Used   Substance and Sexual Activity   • Alcohol use: No   • Drug use: No   • Sexual activity: Not on file   Lifestyle   • Physical activity     Days per week: Not on file     Minutes per session: Not on file   • Stress: Not on file   Relationships   • Social connections     Talks on phone: Not on file     Gets together: Not on file     Attends Episcopalian service: Not on file     Active member of club or organization: Not on file     Attends meetings of clubs or organizations: Not on file     Relationship status: Not on file   • Intimate partner violence     Fear of current or ex partner: Not on file     Emotionally abused: Not on file     Physically abused: Not on file     Forced sexual activity: Not on file   Other Topics Concern   • Not on file   Social History Narrative   • Not on file       Allergies as of 03/12/2020   • (No Known Allergies)        Vitals:  /70 (BP Location: Left arm, Patient Position: Sitting, BP Cuff Size: Adult)   Pulse 73   Resp 16   Ht 1.372 m (4' 6\")   Wt 107 kg (236 lb)   SpO2 95%     Current medications as of today   Current Outpatient Medications   Medication Sig Dispense Refill   • vitamin D, Ergocalciferol, (DRISDOL) 84504 units Cap capsule TOME CELIO CAPSULA POR VIA ORAL EVERY 7 DAYS 12 Cap 0   • pantoprazole (PROTONIX) 20 MG tablet Take 2 Tabs by mouth every " day. 90 Tab 4     No current facility-administered medications for this visit.          Physical Exam:   Gen:           Alert and oriented, No apparent distress. Mood and affect appropriate, normal interaction with examiner.  Eyes:          PERRL, EOM intact, sclere white, conjunctive moist.  Ears:          Not examined.   Hearing:     Grossly intact.  Nose:          Normal, no lesions or deformities.  Dentition:    Good dentition.  Oropharynx:   Tongue normal, posterior pharynx without erythema or exudate.  Mallampati Classification: 3  Neck:        Supple, trachea midline, no masses.  Respiratory Effort: No intercostal retractions or use of accessory muscles.   Lung Auscultation:      Clear to auscultation bilaterally; no rales, rhonchi or wheezing.  CV:            Regular rate and rhythm. No murmurs, rubs or gallops.  Abd:           Not examined.   Lymphadenopathy: Not examined.  Gait and Station: Normal.  Digits and Nails: No clubbing, cyanosis, petechiae, or nodes.   Cranial Nerves: II-XII grossly intact.  Skin:        No rashes, lesions or ulcers noted.               Ext:           No cyanosis or edema.      Assessment:  1. DELIA (obstructive sleep apnea)     2. Morbid obesity (HCC)  Height And Weight   3. Gastroesophageal reflux disease without esophagitis     4. Nonsmoker         Immunizations:    Flu:recommend  Pneumovax 23:not due  Prevnar 13:not due    Plan:  1.  DELIA is clinically stable.  Continue auto CPAP nightly.  DME mask/supplies.  2.  Discussed sleep hygiene.  3.  Encouraged weight loss.  4.  Continue daily PPI.  Reviewed reflux precautions.  5.  Follow-up in 1 year compliance report, sooner if needed.    Please note that this dictation was created using voice recognition software. I have made every reasonable attempt to correct obvious errors, but it is possible there are errors of grammar and possibly content that I did not discover before finalizing the note.

## 2021-03-05 ENCOUNTER — TELEPHONE (OUTPATIENT)
Dept: SLEEP MEDICINE | Facility: MEDICAL CENTER | Age: 31
End: 2021-03-05

## 2021-03-05 NOTE — TELEPHONE ENCOUNTER
Received voice message from patient's  Pinky stating patient needs a C-pap machine.[phone]    I returned her call but only got her voice mail. The message I left was that patient will need appointment to discuss and access equipment needs as we have not seen this patient since 3/12/20 (a year ago) I provided a call number for scheduling.

## 2021-05-26 ENCOUNTER — APPOINTMENT (OUTPATIENT)
Dept: SLEEP MEDICINE | Facility: MEDICAL CENTER | Age: 31
End: 2021-05-26
Payer: MEDICAID

## 2021-08-19 ENCOUNTER — SLEEP CENTER VISIT (OUTPATIENT)
Dept: SLEEP MEDICINE | Facility: MEDICAL CENTER | Age: 31
End: 2021-08-19
Payer: MEDICAID

## 2021-08-19 VITALS
WEIGHT: 223 LBS | HEIGHT: 55 IN | BODY MASS INDEX: 51.61 KG/M2 | SYSTOLIC BLOOD PRESSURE: 118 MMHG | RESPIRATION RATE: 16 BRPM | HEART RATE: 85 BPM | DIASTOLIC BLOOD PRESSURE: 70 MMHG | OXYGEN SATURATION: 93 %

## 2021-08-19 DIAGNOSIS — Z78.9 NONSMOKER: ICD-10-CM

## 2021-08-19 DIAGNOSIS — G47.33 OSA (OBSTRUCTIVE SLEEP APNEA): ICD-10-CM

## 2021-08-19 PROCEDURE — 99213 OFFICE O/P EST LOW 20 MIN: CPT | Performed by: NURSE PRACTITIONER

## 2021-08-19 NOTE — PROGRESS NOTES
Chief Complaint   Patient presents with   • Apnea     last seen 3/12/2020        HPI:  Christi Mcgarry is a 30 y.o. year old female here today for follow-up on DELIA.  Last office visit 3/12/2020    Currently using auto CPAP@5-15 cm H20 nightly; Respironics; device obtained 2019.  Recall discussed with patient letter provided; patient with an register device.  She will continue using her device and obtain an interim inline bacteria filter.    Compliance report notes only 4 days of use in last 30 days.  Average nightly use 1 hour 7 minutes, mean pressure 11.2 cm, no significant mask leak with reduced AHI 1.7/h.  Patient has no significant explanation as to why she is not using her device.  She feels she just got out of her routine.  We reviewed the importance of treating her sleep apnea due to persistent low oxygen levels noted during original sleep study.  She is amenable to restarting therapy.  She notes ongoing fatigue and sometimes napping during the day currently.  We reviewed that the symptoms are most likely due to her untreated sleep apnea.    Sleep history:  Sleep study in April 2018 indicated mild obstructive with an AHI of 9.4 and a low 02 of 64%.     Repeat PSG 11/26/2018 indicates mild obstructive sleep apnea with an AHI of 10.8 with low 02 of 85%. She spent 57.6% of the diagnostic portion with saturations less than 90%. She was titrated to a CPAP pressure of 9 CM H20 with a resultant AHI of 0, mean 02 of 92%, low 87% with achievement of both supine and REM sleep.         ROS: As per HPI and otherwise negative if not stated.    Past Medical History:   Diagnosis Date   • ADHD (attention deficit hyperactivity disorder)    • Chickenpox    • Headache 5/25/2016   • Insomnia 5/25/2016   • Learning disabilities    • Morbid obesity (HCC) 5/25/2016   • Nocturnal enuresis 5/25/2016   • Obesity    • DELIA (obstructive sleep apnea) 5/25/2016   • Prediabetes 5/25/2016   • Sleep apnea    • UTI (lower urinary tract  "infection) 5/25/2016       Past Surgical History:   Procedure Laterality Date   • TONSILLECTOMY AND ADENOIDECTOMY  4/15/2013    Performed by Nadeem Briceno M.D. at [facility]       Family History   Problem Relation Age of Onset   • Sleep Apnea Neg Hx        Social History     Socioeconomic History   • Marital status: Single     Spouse name: Not on file   • Number of children: Not on file   • Years of education: Not on file   • Highest education level: Not on file   Occupational History   • Not on file   Tobacco Use   • Smoking status: Never Smoker   • Smokeless tobacco: Never Used   Vaping Use   • Vaping Use: Never used   Substance and Sexual Activity   • Alcohol use: No   • Drug use: No   • Sexual activity: Not on file   Other Topics Concern   • Not on file   Social History Narrative   • Not on file     Social Determinants of Health     Financial Resource Strain:    • Difficulty of Paying Living Expenses:    Food Insecurity:    • Worried About Running Out of Food in the Last Year:    • Ran Out of Food in the Last Year:    Transportation Needs:    • Lack of Transportation (Medical):    • Lack of Transportation (Non-Medical):    Physical Activity:    • Days of Exercise per Week:    • Minutes of Exercise per Session:    Stress:    • Feeling of Stress :    Social Connections:    • Frequency of Communication with Friends and Family:    • Frequency of Social Gatherings with Friends and Family:    • Attends Sikhism Services:    • Active Member of Clubs or Organizations:    • Attends Club or Organization Meetings:    • Marital Status:    Intimate Partner Violence:    • Fear of Current or Ex-Partner:    • Emotionally Abused:    • Physically Abused:    • Sexually Abused:        Allergies as of 08/19/2021   • (No Known Allergies)        Vitals:  /70 (BP Location: Left arm, Patient Position: Sitting, BP Cuff Size: Adult)   Pulse 85   Resp 16   Ht 1.372 m (4' 6\")   Wt 101 kg (223 lb)   SpO2 93% "     Current medications as of today   Current Outpatient Medications   Medication Sig Dispense Refill   • vitamin D, Ergocalciferol, (DRISDOL) 02901 units Cap capsule TOME CELIO CAPSULA POR VIA ORAL EVERY 7 DAYS 12 Cap 0   • pantoprazole (PROTONIX) 20 MG tablet Take 2 Tabs by mouth every day. 90 Tab 4     No current facility-administered medications for this visit.         Physical Exam:   Gen:           Alert and oriented, No apparent distress. Mood and affect appropriate, normal interaction with examiner.  Eyes:          PERRL, EOM intact, sclere white, conjunctive moist.  Masses  Ears:          Not examined.   Hearing:     Grossly intact.  Nose:          Normal, no lesions or deformities.  Dentition:    Mask  Oropharynx:   Mask.  Mallampati Classification: Mask  Neck:        Supple, trachea midline, no masses.  Respiratory Effort: No intercostal retractions or use of accessory muscles.   Lung Auscultation:      Clear to auscultation bilaterally; no rales, rhonchi or wheezing.  CV:            Regular rate and rhythm. No murmurs, rubs or gallops.  Abd:           Not examined.   Lymphadenopathy: Not examined.  Gait and Station: Normal.  Digits and Nails: No clubbing, cyanosis, petechiae, or nodes.   Cranial Nerves: II-XII grossly intact.  Skin:        No rashes, lesions or ulcers noted.               Ext:           No cyanosis or edema.      Assessment:  1. DELIA (obstructive sleep apnea)  DME Mask and Supplies    DME Other   2. BMI 50.0-59.9, adult (HCC)  Height And Weight   3. Nonsmoker         Immunizations:    Flu: Recommend in fall  Pneumovax 23: Not due  Prevnar 13: Not due   COVID-19: 7/23/2021, 6/29/2021    Plan:  1.  DELAI is not well controlled at this time and patient is having breakthrough symptoms of fatigue and napping.  She is amenable to restarting therapy.  She understands the benefit of therapy.  Restart auto CPAP 5 to 15 cm nightly.  DME mask/supplies  DME other; inline bacteria filter for use in  interim recall  2.  Discussed sleep hygiene and the need for consistent nightly use for the duration of sleep  3.  Encourage weight loss through diet and exercise  4.  For primary care for the health concerns  5.  Follow-up in 3 months for compliance check, sooner if needed.  If patient is back to consistent nightly therapy with control of apneas may go to annual visits.    Please note that this dictation was created using voice recognition software. I have made every reasonable attempt to correct obvious errors, but it is possible there are errors of grammar and possibly content that I did not discover before finalizing the note.

## 2021-10-18 PROBLEM — Z00.00 ENCOUNTER FOR GENERAL ADULT MEDICAL EXAMINATION WITHOUT ABNORMAL FINDINGS: Status: ACTIVE | Noted: 2019-12-20

## 2021-10-18 PROBLEM — Q90.9 DOWN SYNDROME: Status: ACTIVE | Noted: 2021-04-20

## 2021-10-18 PROBLEM — Z30.41 ENCOUNTER FOR SURVEILLANCE OF CONTRACEPTIVE PILLS: Status: ACTIVE | Noted: 2020-08-17

## 2021-10-18 PROBLEM — F79 INTELLECTUAL DISABILITY: Status: ACTIVE | Noted: 2021-03-18

## 2021-10-18 PROBLEM — M25.561 ARTHRALGIA OF RIGHT KNEE: Status: ACTIVE | Noted: 2020-07-22

## 2021-12-03 ENCOUNTER — OFFICE VISIT (OUTPATIENT)
Dept: INTERNAL MEDICINE | Facility: OTHER | Age: 31
End: 2021-12-03
Payer: MEDICAID

## 2021-12-03 VITALS
HEIGHT: 55 IN | TEMPERATURE: 97.6 F | BODY MASS INDEX: 51.61 KG/M2 | DIASTOLIC BLOOD PRESSURE: 74 MMHG | WEIGHT: 223 LBS | SYSTOLIC BLOOD PRESSURE: 126 MMHG | HEART RATE: 68 BPM | OXYGEN SATURATION: 96 %

## 2021-12-03 DIAGNOSIS — R73.03 PREDIABETES: ICD-10-CM

## 2021-12-03 DIAGNOSIS — Z23 NEED FOR VACCINATION: ICD-10-CM

## 2021-12-03 DIAGNOSIS — G47.33 OSA (OBSTRUCTIVE SLEEP APNEA): ICD-10-CM

## 2021-12-03 DIAGNOSIS — K21.9 GASTROESOPHAGEAL REFLUX DISEASE WITHOUT ESOPHAGITIS: ICD-10-CM

## 2021-12-03 DIAGNOSIS — G56.01 CARPAL TUNNEL SYNDROME OF RIGHT WRIST: ICD-10-CM

## 2021-12-03 DIAGNOSIS — G62.9 NEUROPATHY: ICD-10-CM

## 2021-12-03 DIAGNOSIS — E66.01 CLASS 3 SEVERE OBESITY DUE TO EXCESS CALORIES WITH SERIOUS COMORBIDITY AND BODY MASS INDEX (BMI) OF 50.0 TO 59.9 IN ADULT (HCC): ICD-10-CM

## 2021-12-03 PROBLEM — Z30.41 ENCOUNTER FOR SURVEILLANCE OF CONTRACEPTIVE PILLS: Status: RESOLVED | Noted: 2020-08-17 | Resolved: 2021-12-03

## 2021-12-03 PROBLEM — M25.561 ARTHRALGIA OF RIGHT KNEE: Status: RESOLVED | Noted: 2020-07-22 | Resolved: 2021-12-03

## 2021-12-03 PROBLEM — Z00.00 ENCOUNTER FOR GENERAL ADULT MEDICAL EXAMINATION WITHOUT ABNORMAL FINDINGS: Status: RESOLVED | Noted: 2019-12-20 | Resolved: 2021-12-03

## 2021-12-03 PROCEDURE — 90471 IMMUNIZATION ADMIN: CPT | Performed by: STUDENT IN AN ORGANIZED HEALTH CARE EDUCATION/TRAINING PROGRAM

## 2021-12-03 PROCEDURE — 90686 IIV4 VACC NO PRSV 0.5 ML IM: CPT | Performed by: STUDENT IN AN ORGANIZED HEALTH CARE EDUCATION/TRAINING PROGRAM

## 2021-12-03 PROCEDURE — 99214 OFFICE O/P EST MOD 30 MIN: CPT | Mod: EP,GC | Performed by: STUDENT IN AN ORGANIZED HEALTH CARE EDUCATION/TRAINING PROGRAM

## 2021-12-03 RX ORDER — METFORMIN HYDROCHLORIDE 500 MG/1
500 TABLET, EXTENDED RELEASE ORAL DAILY
Qty: 30 TABLET | Refills: 5 | Status: SHIPPED | OUTPATIENT
Start: 2021-12-03 | End: 2023-03-01 | Stop reason: SINTOL

## 2021-12-03 ASSESSMENT — PATIENT HEALTH QUESTIONNAIRE - PHQ9: CLINICAL INTERPRETATION OF PHQ2 SCORE: 0

## 2021-12-03 NOTE — PROGRESS NOTES
"Christi Mcgarry is a 31 y.o. female here for a non-provider visit for:   FLU    Reason for immunization: Annual Flu Vaccine  Immunization records indicate need for vaccine: Yes, confirmed with Epic  Minimum interval has been met for this vaccine: Yes  ABN completed: Not Indicated    VIS Dated  08/06/2022 was given to patient: Yes  All IAC Questionnaire questions were answered \"No.\"    Patient tolerated injection and no adverse effects were observed or reported: Yes    Pt scheduled for next dose in series: Not Indicated  "

## 2021-12-03 NOTE — PATIENT INSTRUCTIONS
Switch to Metformin Extended Release Daily  Repeat A1c/Labs in April   Consider Wrist Brace       Carpal Tunnel Syndrome    Carpal tunnel syndrome is a condition that causes pain in your hand and arm. The carpal tunnel is a narrow area that is on the palm side of your wrist. Repeated wrist motion or certain diseases may cause swelling in the tunnel. This swelling can pinch the main nerve in the wrist (median nerve).  What are the causes?  This condition may be caused by:  · Repeated wrist motions.  · Wrist injuries.  · Arthritis.  · A sac of fluid (cyst) or abnormal growth (tumor) in the carpal tunnel.  · Fluid buildup during pregnancy.  Sometimes the cause is not known.  What increases the risk?  The following factors may make you more likely to develop this condition:  · Having a job in which you move your wrist in the same way many times. This includes jobs like being a  or a .  · Being a woman.  · Having other health conditions, such as:  ? Diabetes.  ? Obesity.  ? A thyroid gland that is not active enough (hypothyroidism).  ? Kidney failure.  What are the signs or symptoms?  Symptoms of this condition include:  · A tingling feeling in your fingers.  · Tingling or a loss of feeling (numbness) in your hand.  · Pain in your entire arm. This pain may get worse when you bend your wrist and elbow for a long time.  · Pain in your wrist that goes up your arm to your shoulder.  · Pain that goes down into your palm or fingers.  · A weak feeling in your hands. You may find it hard to grab and hold items.  You may feel worse at night.  How is this diagnosed?  This condition is diagnosed with a medical history and physical exam. You may also have tests, such as:  · Electromyogram (EMG). This test checks the signals that the nerves send to the muscles.  · Nerve conduction study. This test checks how well signals pass through your nerves.  · Imaging tests, such as X-rays, ultrasound, and MRI. These tests check  for what might be the cause of your condition.  How is this treated?  This condition may be treated with:  · Lifestyle changes. You will be asked to stop or change the activity that caused your problem.  · Doing exercise and activities that make bones and muscles stronger (physical therapy).  · Learning how to use your hand again (occupational therapy).  · Medicines for pain and swelling (inflammation). You may have injections in your wrist.  · A wrist splint.  · Surgery.  Follow these instructions at home:  If you have a splint:  · Wear the splint as told by your doctor. Remove it only as told by your doctor.  · Loosen the splint if your fingers:  ? Tingle.  ? Lose feeling (become numb).  ? Turn cold and blue.  · Keep the splint clean.  · If the splint is not waterproof:  ? Do not let it get wet.  ? Cover it with a watertight covering when you take a bath or a shower.  Managing pain, stiffness, and swelling    · If told, put ice on the painful area:  ? If you have a removable splint, remove it as told by your doctor.  ? Put ice in a plastic bag.  ? Place a towel between your skin and the bag.  ? Leave the ice on for 20 minutes, 2-3 times per day.  General instructions  · Take over-the-counter and prescription medicines only as told by your doctor.  · Rest your wrist from any activity that may cause pain. If needed, talk with your boss at work about changes that can help your wrist heal.  · Do any exercises as told by your doctor, physical therapist, or occupational therapist.  · Keep all follow-up visits as told by your doctor. This is important.  Contact a doctor if:  · You have new symptoms.  · Medicine does not help your pain.  · Your symptoms get worse.  Get help right away if:  · You have very bad numbness or tingling in your wrist or hand.  Summary  · Carpal tunnel syndrome is a condition that causes pain in your hand and arm.  · It is often caused by repeated wrist motions.  · Lifestyle changes and medicines  are used to treat this problem. Surgery may help in very bad cases.  · Follow your doctor's instructions about wearing a splint, resting your wrist, keeping follow-up visits, and calling for help.  This information is not intended to replace advice given to you by your health care provider. Make sure you discuss any questions you have with your health care provider.  Document Released: 12/06/2012 Document Revised: 04/26/2019 Document Reviewed: 04/26/2019  Elsevier Patient Education © 2020 Elsevier Inc[employer]      Síndrome del túnel carpiano  Carpal Tunnel Syndrome    El síndrome del túnel carpiano es karissa afección que causa dolor en la mano y en el brazo. El túnel carpiano es un área estrecha ubicada en el lado palmar de la al. Los movimientos repetidos de la al o determinadas enfermedades pueden causar la hinchazón del túnel. Esta hinchazón comprime el nervio principal de la al (nervio mediano).  ¿Cuáles son las causas?  Esta afección puede ser causada por lo siguiente:  · Movimientos repetidos de la al.  · Lesiones en la al.  · Artritis.  · Un quiste o un tumor en el túnel carpiano.  · Acumulación de líquido ruiz el embarazo.  A veces, se desconoce la causa de esta afección.  ¿Qué incrementa el riesgo?  Los siguientes factores pueden hacer que sea más propenso a contraer esta afección:  · Tener un trabajo que requiera  repetidamente la al del mismo modo, por ejemplo, de carnicero o cajero.  · Ser rosio.  · Tener ciertas afecciones, tales najma:  ? Diabetes.  ? Obesidad.  ? Tiroidea hipoactiva (hipotiroidismo).  ? Insuficiencia renal.  ¿Cuáles son los signos o síntomas?  Los síntomas de esta afección incluyen:  · Sensación de hormigueo en los dedos de la mano, especialmente el pulgar, el índice y el dedo medio.  · Hormigueo o adormecimiento en la mano.  · Sensación de dolor en todo el brazo, especialmente cuando la al y el codo están flexionados ruiz mucho tiempo.  · Dolor en la  al que sube por el brazo hasta el hombro.  · Dolor que baja hasta la contreras de la mano o los dedos.  · Sensación de debilidad en las marva. Cheikh vez tenga dificultad para keily y sostener objetos.  Los síntomas pueden empeorar ruiz la noche.  ¿Cómo se diagnostica?  Esta afección se diagnostica mediante los antecedentes médicos y un examen físico. También pueden hacerle estudios, que incluyen los siguientes:  · Inna electromiograma (EMG). Esta prueba mide las señales eléctricas que los nervios les envían a los músculos.  · Estudio de conducción nerviosa. Jacquelyn estudio permite determinar si las señales eléctricas pasan correctamente por los nervios.  · Estudios de diagnóstico por imágenes, najma radiografías, inna ecografía y inna resonancia magnética (RM). Estos estudios permiten detectar las posibles causas de la afección.  ¿Cómo se trata?  El tratamiento de esta afección puede incluir:  · Cambios en el estilo de karol. Es importante que deje o cambie la actividad que causó la afección.  · Hacer ejercicio y actividades para fortalecer los músculos y los huesos (fisioterapia).  · Aprender a usar la mano nuevamente después del diagnóstico (terapia ocupacional).  · Analgésicos y antiinflamatorios. [place] puede incluir medicamentos que se inyectan en la al.  · Inna férula para la al.  · Inna cirugía.  Siga estas indicaciones en hamilton casa:  Si tiene inna férula:  · Use la férula najma se lo haya indicado el médico. Quítesela solamente najma se lo haya indicado el médico.  · Afloje la férula si los dedos se le adormecen, siente hormigueo o se le enfrían y se tornan de color nishi.  · Mantenga la férula limpia.  · Si la férula no es impermeable:  ? No deje que se moje.  ? Cúbrala con un envoltorio hermético cuando tome un baño de inmersión o inna ducha.  Control del dolor, la rigidez y la hinchazón    · Si se lo indican, aplique hielo sobre la parul del dolor:  ? Si tiene inna férula desmontable, quítesela najma se lo haya  indicado el médico.  ? Ponga el hielo en karissa bolsa plástica.  ? Coloque karissa toalla entre la piel y la bolsa.  ? Coloque el hielo ruiz 20 minutos, 2 a 3 veces al día.  Indicaciones generales  · [place] los medicamentos de venta [place] y los recetados solamente najma se lo haya indicado el médico.  · Descanse la al de toda actividad que le cause dolor. Si la afección tiene relación con el trabajo, hable con hamilton empleador sobre los cambios que pueden hacerse, por ejemplo, usar karissa almohadilla para apoyar la al mientras tipea.  · [place] los ejercicios najma se lo hayan indicado el médico, el fisioterapeuta o el terapeuta ocupacional.  · Concurra a todas las visitas de seguimiento najma se lo haya indicado el médico. [place] es importante.  Comuníquese con un médico si:  · Aparecen nuevos síntomas.  · El dolor no se marcelo con los medicamentos.  · [place] síntomas empeoran.  Solicite ayuda de inmediato si:  · Tiene hormigueo o adormecimiento intensos en la al o la mano.  Resumen  · El síndrome del túnel carpiano es karissa afección que causa dolor en la mano y en el brazo.  · Generalmente se debe a movimientos repetidos de la al.  · El síndrome del túnel carpiano se trata mediante cambios en el estilo de karol y medicamentos. También puede indicarse la cirugía.  · Siga las indicaciones del médico sobre el uso de karissa férula, el descanso de la actividad, la asistencia a las visitas de seguimiento y llamar para pedir ayuda.  Esta información no tiene najma fin reemplazar el consejo del médico. Asegúrese de hacerle al médico cualquier pregunta que tenga.  Document Released: 12/18/2006 Document Revised: 05/23/2019 Document Reviewed: 05/23/2019  Elsevier Patient Education © 2020 Elsevier Inc.

## 2021-12-03 NOTE — PROGRESS NOTES
Established Patient    Patient Care Team:  Mery Goodwin D.O. as PCP - General (Internal Medicine)  Preferred home care  as Respiratory Therapist (DME Supplier)    HPI:  Christi Mcgarry is a 31 y.o. female who presents today with the following Chief Complaint(s): Follow up for Diagnoses of Prediabetes, DELIA (obstructive sleep apnea), Class 3 severe obesity due to excess calories with serious comorbidity and body mass index (BMI) of 50.0 to 59.9 in adult ([facility]), and Gastroesophageal reflux disease without esophagitis were pertinent to this visit.    Prediabetes:   A1c of 6.3 - Was started on Metformin 850mg Daily April 2021   Presented with acute diarrhea in June, Metformin lowered to 500mg Daily   Reports she is still having loose stools, immediately after eating     GERD:  On PPI, well controlled    DELIA: Last seen by sleep medicine 08/19/2021 (AHI 9.8 from 2018)  On AutoCPAP nightly, has been adherent     Neuropathy: Bilateral  L>R numbness and tingling to her hands and wrist. There is no weakness or pain. She reports she works with her hands.       ROS:     Review of Systems   Constitutional: Negative for chills and fever.   Eyes: Negative for blurred vision and double vision.   Respiratory: Negative for cough and hemoptysis.    Cardiovascular: Negative for chest pain and palpitations.   Gastrointestinal: Positive for diarrhea and nausea. Negative for blood in stool and melena.   Genitourinary: Negative for dysuria and urgency.   Musculoskeletal: Negative for myalgias and neck pain.   Skin: Negative for itching and rash.     Past Medical History:   Diagnosis Date   • ADHD (attention deficit hyperactivity disorder)    • Chickenpox    • Headache 5/25/2016   • Insomnia 5/25/2016   • Learning disabilities    • Morbid obesity (HCC) 5/25/2016   • Nocturnal enuresis 5/25/2016   • Obesity    • DELIA (obstructive sleep apnea) 5/25/2016   • Prediabetes 5/25/2016   • Sleep apnea    • UTI (lower urinary tract  infection) 5/25/2016     Social History     Tobacco Use   • Smoking status: Never Smoker   • Smokeless tobacco: Never Used   Vaping Use   • Vaping Use: Never used   Substance Use Topics   • Alcohol use: No   • Drug use: No     Current Outpatient Medications   Medication Sig Dispense Refill   • metFORMIN (GLUCOPHAGE) 500 MG Tab Take 500 mg by mouth every day.     • LO LOESTRIN FE 1 MG-10 MCG / 10 MCG Tab Take 1 Tablet by mouth every day.     • omeprazole (PRILOSEC) 20 MG delayed-release capsule Take 20 mg by mouth every day.     • vitamin D, Ergocalciferol, (DRISDOL) 81555 units Cap capsule TOME CELIO CAPSULA POR VIA ORAL EVERY 7 DAYS 12 Cap 0     No current facility-administered medications for this visit.       Physical Exam:  There were no vitals taken for this visit.  General: Well developed, well nourished female, in no distress.  Eyes: Conjuntiva without any obvious injection or erythema.   Cardiovascular: Heart is regular with no murmur  Lungs: Clear to auscultation bilaterally. No wheezes, rhonci or crackles heard. Respiratory effort is normal.  Abd: Soft, non-tender  Ext: No pedal edema  Physical Exam  Musculoskeletal:      Right hand: No swelling, deformity or tenderness. Normal range of motion. Normal strength. Normal sensation. Normal capillary refill. Normal pulse.      Left hand: No swelling, deformity or tenderness. Normal range of motion. Normal strength. Normal sensation. Normal capillary refill. Normal pulse.          Assessment and Plan:    Diagnoses and all orders for this visit:  Prediabetes  DELIA (obstructive sleep apnea)  Class 3 severe obesity due to excess calories with serious comorbidity and body mass index (BMI) of 50.0 to 59.9 in adult (HCC)  Gastroesophageal reflux disease without esophagitis    She is to start Metformin extended release and take with her largest meal.   She is to use wrist bracing at night for Carpal Tunnel Syndrome. If no improvement, consider EMG/NCS and Ortho referral  for CT release.     KODY AngO PGY III  [facility]

## 2021-12-05 ASSESSMENT — ENCOUNTER SYMPTOMS
CHILLS: 0
PALPITATIONS: 0
BLURRED VISION: 0
FEVER: 0
DOUBLE VISION: 0
MYALGIAS: 0
NAUSEA: 1
DIARRHEA: 1
NECK PAIN: 0
HEMOPTYSIS: 0
BLOOD IN STOOL: 0
COUGH: 0

## 2022-04-04 DIAGNOSIS — Z30.41 ENCOUNTER FOR SURVEILLANCE OF CONTRACEPTIVE PILLS: ICD-10-CM

## 2022-04-04 RX ORDER — NORETHINDRONE ACETATE AND ETHINYL ESTRADIOL, ETHINYL ESTRADIOL AND FERROUS FUMARATE 1MG-10(24)
1 KIT ORAL DAILY
Qty: 28 TABLET | Refills: 5 | Status: SHIPPED | OUTPATIENT
Start: 2022-04-04 | End: 2022-09-07

## 2022-04-04 NOTE — TELEPHONE ENCOUNTER
Ayla Loestrin Refill    Last seen: 12/3/21 by Dr. Goodwin  Next appt: 6/10/22 with Dr. Goodwin    Was the patient seen in the last year in this department? Yes   Does patient have an active prescription for medications requested? No   Received Request Via: Pharmacy

## 2022-04-07 ENCOUNTER — HOSPITAL ENCOUNTER (OUTPATIENT)
Dept: LAB | Facility: MEDICAL CENTER | Age: 32
End: 2022-04-07
Attending: STUDENT IN AN ORGANIZED HEALTH CARE EDUCATION/TRAINING PROGRAM
Payer: MEDICAID

## 2022-04-07 DIAGNOSIS — R73.03 PREDIABETES: ICD-10-CM

## 2022-04-07 DIAGNOSIS — G62.9 NEUROPATHY: ICD-10-CM

## 2022-04-07 LAB
ANION GAP SERPL CALC-SCNC: 9 MMOL/L (ref 7–16)
BUN SERPL-MCNC: 11 MG/DL (ref 8–22)
CALCIUM SERPL-MCNC: 8.9 MG/DL (ref 8.5–10.5)
CHLORIDE SERPL-SCNC: 103 MMOL/L (ref 96–112)
CO2 SERPL-SCNC: 25 MMOL/L (ref 20–33)
CREAT SERPL-MCNC: 0.47 MG/DL (ref 0.5–1.4)
EST. AVERAGE GLUCOSE BLD GHB EST-MCNC: 131 MG/DL
FASTING STATUS PATIENT QL REPORTED: NORMAL
GFR SERPLBLD CREATININE-BSD FMLA CKD-EPI: 131 ML/MIN/1.73 M 2
GLUCOSE SERPL-MCNC: 104 MG/DL (ref 65–99)
HBA1C MFR BLD: 6.2 % (ref 4–5.6)
POTASSIUM SERPL-SCNC: 3.8 MMOL/L (ref 3.6–5.5)
SODIUM SERPL-SCNC: 137 MMOL/L (ref 135–145)
TSH SERPL DL<=0.005 MIU/L-ACNC: 0.94 UIU/ML (ref 0.38–5.33)

## 2022-04-07 PROCEDURE — 80048 BASIC METABOLIC PNL TOTAL CA: CPT

## 2022-04-07 PROCEDURE — 83036 HEMOGLOBIN GLYCOSYLATED A1C: CPT

## 2022-04-07 PROCEDURE — 36415 COLL VENOUS BLD VENIPUNCTURE: CPT

## 2022-04-07 PROCEDURE — 84443 ASSAY THYROID STIM HORMONE: CPT

## 2022-06-07 ENCOUNTER — APPOINTMENT (OUTPATIENT)
Dept: INTERNAL MEDICINE | Facility: OTHER | Age: 32
End: 2022-06-07
Payer: MEDICAID

## 2022-09-07 ENCOUNTER — OFFICE VISIT (OUTPATIENT)
Dept: INTERNAL MEDICINE | Facility: OTHER | Age: 32
End: 2022-09-07
Payer: MEDICAID

## 2022-09-07 DIAGNOSIS — E66.01 MORBID OBESITY WITH BMI OF 50.0-59.9, ADULT (HCC): ICD-10-CM

## 2022-09-07 DIAGNOSIS — R73.03 PREDIABETES: ICD-10-CM

## 2022-09-07 DIAGNOSIS — G47.33 OBSTRUCTIVE SLEEP APNEA SYNDROME: ICD-10-CM

## 2022-09-07 DIAGNOSIS — K21.9 GASTROESOPHAGEAL REFLUX DISEASE WITHOUT ESOPHAGITIS: ICD-10-CM

## 2022-09-07 PROCEDURE — 99213 OFFICE O/P EST LOW 20 MIN: CPT | Mod: GE

## 2022-09-07 RX ORDER — OMEPRAZOLE 20 MG/1
20 CAPSULE, DELAYED RELEASE ORAL DAILY
Qty: 30 CAPSULE | Refills: 11 | Status: SHIPPED | OUTPATIENT
Start: 2022-09-07 | End: 2022-10-17 | Stop reason: SDUPTHER

## 2022-09-07 ASSESSMENT — PATIENT HEALTH QUESTIONNAIRE - PHQ9: CLINICAL INTERPRETATION OF PHQ2 SCORE: 0

## 2022-09-07 NOTE — LETTER
"    September 7, 2022    Physical Exam:    BP (!) 140/70 (BP Location: Right arm, Patient Position: Sitting, BP Cuff Size: Large adult)   Pulse 78   Temp 36.6 °C (97.9 °F) (Temporal)   Ht 1.372 m (4' 6\")   Wt 105 kg (230 lb 6.4 oz)   SpO2 96%   BMI 55.55 kg/m²     Repeat /81  Pulse 67     General: Well developed, well nourished female, in no distress.  HEENT: NC/AT, PERRL, EOMI, no scleral icterus or conjunctival pallor, fair dentition/denture in, no nasal discharge or oral erythema or exudates.   Neck: Supple, No cervical or supraclavicular LAD  CV:RRR, no murmurs gallops or Rubs.   Pulm: LCAB, no crackles, rales, rhonchi, or wheezing  GI: Normal bowel sounds, abdomen soft, nontender, nondistended to deep or light palpation in all 4 quadrants.  MSK: Radial and dorsalis pedis pulses 2+ and equal bilaterally, respectively.  Strength 5 out of 5 in upper and lower extremities.  No lower extremity edema  Neuro: Patient is alert and oriented x3, no focal deficits  Psych: Appropriate mood and affect   "

## 2022-09-07 NOTE — PROGRESS NOTES
Established Patient    Patient Care Team:  Serena Clemons M.D. as PCP - General (Internal Medicine)  Preferred home care  as Respiratory Therapist (DME Supplier)    Christi Mcgarry is a 31 y.o. female who presents today with the following Chief Complaint(s): Follow up for Diagnoses of Prediabetes, Morbid obesity with BMI of 50.0-59.9, adult (HCC), Obstructive sleep apnea syndrome, and Gastroesophageal reflux disease without esophagitis were pertinent to this visit.    HPI:  Patient is a 31-year-old female with a past medical history of obesity, prediabetes, ADHD, Down syndrome, DELIA, GERD who presents to the clinic today with a caretaker for follow-up on her chronic medical conditions.    A1c 6.2 on 4/7/2022.  Patient currently on metformin  mg daily.  Patient compliant with medication.  States she experiences diarrhea daily from the medication even though she takes it with food.  Patient does not seem to mind side effect.    Patient's  weight has been stable.  Exercises for about 1.5 hours at the gym 3 times a week.  States she walks on a treadmill for 45 minutes followed by exercise bike for 45 minutes.  Patient states she does eat cookies, cakes and chips.  Drinks 1 soda daily.  Eats 1 small bag of hot Cheetos.  Patient states she avoids eating lots of bread or rice, but upon further questioning states she eats 3-4 tortillas daily.  Has seen nutrition in the past.  Currently denies any joint pain.  No fevers, chills, fatigue, chest pain, shortness of breath, abdominal pain or weakness.    History of DELIA.  AHI 9.8 from 2018.  Patient on auto CPAP nightly and compliant with CPAP use.    Takes omeprazole 20 mg daily for GERD.  Is currently experiencing heartburn due to running out of medication.  Patient states that the omeprazole controls her symptoms.  Patient requesting refill.        ROS:     General: No fevers, chills, night sweats, weight loss or gain  HEENT: No hearing changes, vision changes,  "eye pain, ear pain, nasal discharge, sore throat  Neck: No swelling in neck  Pulmonary: No shortness of breath, cough, sputum, or hemoptysis  Cardiovascular: No chest pain, palpitations, or LE swelling  GI: Diarrhea. No nausea, vomiting, constipation, abdominal pain, hematochezia or melena  : No dysuria or frequency  Neuro: No focal weakness, no general weakness, no headaches, no lightheadedness, no dizziness  Psych: No anxiety or depression    Past Medical History:   Diagnosis Date    ADHD (attention deficit hyperactivity disorder)     Chickenpox     Headache 5/25/2016    Insomnia 5/25/2016    Learning disabilities     Morbid obesity (HCC) 5/25/2016    Nocturnal enuresis 5/25/2016    Obesity     DEILA (obstructive sleep apnea) 5/25/2016    Prediabetes 5/25/2016    Sleep apnea     UTI (lower urinary tract infection) 5/25/2016     Social History     Tobacco Use    Smoking status: Never    Smokeless tobacco: Never   Vaping Use    Vaping Use: Never used   Substance Use Topics    Alcohol use: No    Drug use: No     Current Outpatient Medications   Medication Sig Dispense Refill    omeprazole (PRILOSEC) 20 MG delayed-release capsule Take 1 Capsule by mouth every day. 30 Capsule 11    metFORMIN ER (GLUCOPHAGE XR) 500 MG TABLET SR 24 HR Take 1 Tablet by mouth every day. 30 Tablet 5     No current facility-administered medications for this visit.       Physical Exam:  /81 (BP Location: Left arm)   Pulse 67   Temp 36.6 °C (97.9 °F) (Temporal)   Ht 1.372 m (4' 6\")   Wt 105 kg (230 lb 6.4 oz)   SpO2 96%   BMI 55.55 kg/m²   General: Well developed, well nourished female, in no distress.  HEENT: NC/AT, PERRL, EOMI, no scleral icterus or conjunctival pallor, fair dentition/denture in, no nasal discharge or oral erythema or exudates.   Neck: Supple, No cervical or supraclavicular LAD  CV:RRR, no murmurs gallops or Rubs, no JVD  Pulm: LCAB, no crackles, rales, rhonchi, or wheezing  GI: Normal bowel sounds, abdomen soft, " nontender, nondistended to deep or light palpation in all 4 quadrants, no HSM.  MSK: Radial and dorsalis pedis pulses 2+ and equal bilaterally, respectively.  Strength 5 out of 5 in upper and lower extremities.  No lower extremity edema  Neuro: Patient is alert and oriented x3, no focal deficits  Psych: Appropriate mood and affect       Assessment and Plan:   1. Prediabetes  A1c 6.2 on 4/7/2022.    Metformin  mg daily, compliant with medication.    Still experiencing diarrhea, but okay with side effect  -Discussed with patient to continue taking medication with food and diarrhea will likely resolve over time  -Will recheck A1c in 6 months  -Follow-up in 6 months     2. Morbid obesity with BMI of 50.0-59.9, adult (HCC)  Weight stable  Exercises 3 times a week  Has seen nutrition in the past  -Discussed with patient and caretaker small changes she can make in her diet gradually over time  -Discussed with patient to continue exercise  -Due to weight, discussed with patient and caretaker need for prevention of osteoarthritis in the future.    -We will obtain bilateral knee x-rays to evaluate for osteoarthritis.  If evidence of osteoarthritis, will notify patient and caretaker at [phone] start glucosamine conjoint tendon.    -Discussed referral to physical therapy to strengthen abductors of the hip and knees to slow progression of osteoarthritis.  - DX-KNEES-AP BILATERAL STANDING; Future  - Referral to Physical Therapy  - Patient identified as having weight management issue.  Appropriate orders and counseling given.  -We will recheck CMP, A1c and lipid in 6 months  -Recommended patient get flu vaccine and new COVID booster.  -Follow-up in 6 months    3. Obstructive sleep apnea syndrome  AHI 9.8 from 2018.   On auto CPAP nightly.  Compliant with CPAP   -Continue CPAP    4. Gastroesophageal reflux disease without esophagitis  Symptoms in the past controlled by omeprazole  Currently experiencing symptoms, ran  out of medication  Patient requesting refill of omeprazole  -Refilled omeprazole  -Follow-up in 6 months      Return in about 4 weeks (around 10/5/2022).    Patient Instructions   -Continue diet and exercise.  -Continue medications.   -Schedule Pap smear   -I refilled omeprazole   -Referral for physcial therapy placed. Someone from out office will call you in 2 weeks to schedule appointment. If you don't hear from us, please call the office  -Get knee xray done   -Recommend getting flu vaccine and Covid booster. Can get these at any local pharmacy   -It was nice visiting with you today!      Serena Clemons M.D. PGY-1  [facility] [employer]    This note was created using voice recognition software.  While every attempt is made to ensure accuracy of transcription, occasionally errors occur.  .

## 2022-09-07 NOTE — PATIENT INSTRUCTIONS
-Continue diet and exercise.  -Continue medications.   -Schedule Pap smear   -I refilled omeprazole   -Referral for physcial therapy placed. Someone from out office will call you in 2 weeks to schedule appointment. If you don't hear from us, please call the office  -Get knee xray done   -Recommend getting flu vaccine and Covid booster. Can get these at any local pharmacy   -It was nice visiting with you today!

## 2022-09-08 VITALS
HEIGHT: 55 IN | BODY MASS INDEX: 53.32 KG/M2 | HEART RATE: 67 BPM | SYSTOLIC BLOOD PRESSURE: 122 MMHG | TEMPERATURE: 97.9 F | WEIGHT: 230.4 LBS | OXYGEN SATURATION: 96 % | DIASTOLIC BLOOD PRESSURE: 81 MMHG

## 2022-09-08 PROBLEM — E66.01 MORBID OBESITY WITH BMI OF 50.0-59.9, ADULT (HCC): Status: ACTIVE | Noted: 2022-09-08

## 2022-09-23 DIAGNOSIS — M17.0 OSTEOARTHRITIS OF BOTH KNEES, UNSPECIFIED OSTEOARTHRITIS TYPE: ICD-10-CM

## 2022-09-23 DIAGNOSIS — E66.01 MORBID OBESITY WITH BMI OF 50.0-59.9, ADULT (HCC): ICD-10-CM

## 2022-09-30 ENCOUNTER — TELEPHONE (OUTPATIENT)
Dept: INTERNAL MEDICINE | Facility: OTHER | Age: 32
End: 2022-09-30
Payer: MEDICAID

## 2022-10-11 DIAGNOSIS — M17.0 OSTEOARTHRITIS OF BOTH KNEES, UNSPECIFIED OSTEOARTHRITIS TYPE: ICD-10-CM

## 2022-10-11 DIAGNOSIS — M25.562 PAIN IN BOTH KNEES, UNSPECIFIED CHRONICITY: ICD-10-CM

## 2022-10-11 DIAGNOSIS — M25.561 PAIN IN BOTH KNEES, UNSPECIFIED CHRONICITY: ICD-10-CM

## 2022-10-17 ENCOUNTER — APPOINTMENT (OUTPATIENT)
Dept: INTERNAL MEDICINE | Facility: OTHER | Age: 32
End: 2022-10-17
Payer: MEDICAID

## 2022-10-17 ENCOUNTER — TELEPHONE (OUTPATIENT)
Dept: INTERNAL MEDICINE | Facility: OTHER | Age: 32
End: 2022-10-17

## 2022-10-17 DIAGNOSIS — E66.01 MORBID OBESITY WITH BMI OF 50.0-59.9, ADULT (HCC): ICD-10-CM

## 2022-10-17 RX ORDER — OMEPRAZOLE 20 MG/1
20 CAPSULE, DELAYED RELEASE ORAL DAILY
Qty: 90 CAPSULE | Refills: 11 | Status: SHIPPED | OUTPATIENT
Start: 2022-10-17 | End: 2023-05-16 | Stop reason: SDUPTHER

## 2022-12-01 ENCOUNTER — APPOINTMENT (OUTPATIENT)
Dept: INTERNAL MEDICINE | Facility: OTHER | Age: 32
End: 2022-12-01
Payer: MEDICAID

## 2023-02-09 ENCOUNTER — OFFICE VISIT (OUTPATIENT)
Dept: INTERNAL MEDICINE | Facility: OTHER | Age: 33
End: 2023-02-09
Payer: MEDICAID

## 2023-02-09 VITALS
BODY MASS INDEX: 55.96 KG/M2 | HEART RATE: 65 BPM | TEMPERATURE: 97.2 F | WEIGHT: 241.8 LBS | HEIGHT: 55 IN | SYSTOLIC BLOOD PRESSURE: 113 MMHG | DIASTOLIC BLOOD PRESSURE: 68 MMHG | OXYGEN SATURATION: 99 %

## 2023-02-09 DIAGNOSIS — R73.03 PREDIABETES: ICD-10-CM

## 2023-02-09 DIAGNOSIS — R51.9 OCCIPITAL HEADACHE: ICD-10-CM

## 2023-02-09 DIAGNOSIS — E55.9 VITAMIN D DEFICIENCY: ICD-10-CM

## 2023-02-09 DIAGNOSIS — Z71.89 GRIEF COUNSELING: ICD-10-CM

## 2023-02-09 DIAGNOSIS — Z00.00 ENCOUNTER FOR SCREENING AND PREVENTATIVE CARE: ICD-10-CM

## 2023-02-09 PROCEDURE — 99214 OFFICE O/P EST MOD 30 MIN: CPT | Mod: GC | Performed by: STUDENT IN AN ORGANIZED HEALTH CARE EDUCATION/TRAINING PROGRAM

## 2023-02-09 RX ORDER — CYCLOBENZAPRINE HCL 5 MG
5 TABLET ORAL 3 TIMES DAILY PRN
Qty: 30 TABLET | Refills: 0 | Status: SHIPPED | OUTPATIENT
Start: 2023-02-09 | End: 2023-09-15

## 2023-02-09 ASSESSMENT — PATIENT HEALTH QUESTIONNAIRE - PHQ9: CLINICAL INTERPRETATION OF PHQ2 SCORE: 0

## 2023-02-09 NOTE — PROGRESS NOTES
Subjective:     CC:   Chief Complaint   Patient presents with    Annual Exam       HPI:   Christi Mcgarry is a 32 y.o. female who presents for annual exam    Patient has GYN provider: {YES/NO:20266}  Last Pap Smear: ***   H/O Abnormal Pap: {YES***/ NO /UNK:38654}  Last Mammogram: ***  Last Bone Density Test: ***  Last Colorectal Cancer Screening: ***  Last Tdap: ***  Received HPV series: {YES/NO/UNK/AGED OUT:62063}    Exercise: {EXERCISE:88855}  Diet: ***      {MENSTRUATING / MENOPAUSAL:52607}    OB History   No obstetric history on file.      She  has no history on file for sexual activity.    She  has a past medical history of ADHD (attention deficit hyperactivity disorder), Chickenpox, Headache (5/25/2016), Insomnia (5/25/2016), Learning disabilities, Morbid obesity (HCC) (5/25/2016), Nocturnal enuresis (5/25/2016), Obesity, DELIA (obstructive sleep apnea) (5/25/2016), Prediabetes (5/25/2016), Sleep apnea, and UTI (lower urinary tract infection) (5/25/2016).    She has no past medical history of Diabetes ([facility]).  She  has a past surgical history that includes tonsillectomy and adenoidectomy (4/15/2013).    Family History   Problem Relation Age of Onset    Sleep Apnea Neg Hx      Social History     Tobacco Use    Smoking status: Never    Smokeless tobacco: Never   Vaping Use    Vaping Use: Never used   Substance Use Topics    Alcohol use: No    Drug use: No       Patient Active Problem List    Diagnosis Date Noted    Morbid obesity with BMI of 50.0-59.9, adult ([facility]) 09/08/2022    Down syndrome 04/20/2021    Body mass index 50.0-59.9, adult ([facility]) 03/02/2020    Odynophagia 02/21/2018    Gastroesophageal reflux disease without esophagitis 07/26/2017    Prediabetes 05/25/2016    Vitamin D deficiency 05/25/2016    Impaired glucose tolerance 09/25/2015    Edema of lower extremity 03/13/2015    Obstructive sleep apnea syndrome 06/27/2014    Insomnia 06/27/2014    Morbid obesity (HCC) 06/27/2014    ADHD 06/27/2014  "    Current Outpatient Medications   Medication Sig Dispense Refill    omeprazole (PRILOSEC) 20 MG delayed-release capsule Take 1 Capsule by mouth every day. 90 Capsule 11    metFORMIN ER (GLUCOPHAGE XR) 500 MG TABLET SR 24 HR Take 1 Tablet by mouth every day. 30 Tablet 5     No current facility-administered medications for this visit.     No Known Allergies    Review of Systems ***  Constitutional: Negative for fever, chills and malaise/fatigue.   HENT: Negative for congestion.    Eyes: Negative for pain.   Respiratory: Negative for cough and shortness of breath.    Cardiovascular: Negative for chest pain and leg swelling.   Gastrointestinal: Negative for nausea, vomiting, abdominal pain and diarrhea.   Genitourinary: Negative for dysuria and hematuria.   Skin: Negative for rash.   Neurological: Negative for dizziness, focal weakness and headaches.   Endo/Heme/Allergies: Does not bruise/bleed easily.   Psychiatric/Behavioral: Negative for depression.  The patient is not nervous/anxious.      Objective:   BP (!) 145/79 (BP Location: Right arm, Patient Position: Sitting, BP Cuff Size: Adult)   Pulse 65   Temp 36.2 °C (97.2 °F) (Temporal)   Ht 1.372 m (4' 6\")   Wt 110 kg (241 lb 12.8 oz)   SpO2 99%   BMI 58.30 kg/m²     Wt Readings from Last 4 Encounters:   02/09/23 110 kg (241 lb 12.8 oz)   09/07/22 105 kg (230 lb 6.4 oz)   12/03/21 101 kg (223 lb)   08/19/21 101 kg (223 lb)       {Chaperone Offered? (Optional):26519}    Physical Exam:***  Constitutional: Well-developed and well-nourished. Not diaphoretic. No distress.   Skin: Skin is warm and dry. No rash noted.  Head: Atraumatic without lesions.  Eyes: Conjunctivae and extraocular motions are normal. Pupils are equal, round, and reactive to light. No scleral icterus.   Ears:  External ears unremarkable. Tympanic membranes clear and intact.  Nose: Nares patent. Septum midline. Turbinates without erythema nor edema. No discharge.   Mouth/Throat: Tongue normal. " Oropharynx is clear and moist. Posterior pharynx without erythema or exudates.  Neck: Supple, trachea midline. Normal range of motion. No thyromegaly present. No lymphadenopathy--cervical or supraclavicular.  Cardiovascular: Regular rate and rhythm, S1 and S2 without murmur, rubs, or gallops.    Respiratory: Effort normal. Clear to auscultation throughout. No adventitious sounds.   {BREAST EXAM (Optional):88303}  Abdomen: Soft, non tender, and without distention. Active bowel sounds in all four quadrants. No rebound, guarding, masses or HSM.  {PELVIC EXAM (Optional):85765}  Extremities: No cyanosis, clubbing, erythema, nor edema. Distal pulses intact and symmetric.   Musculoskeletal: All major joints AROM full in all directions without pain.  Neurological: Alert and oriented x 3. Grossly non-focal. Strength and sensation grossly intact. DTRs 2+/3 and symmetric.   Psychiatric:  Behavior, mood, and affect are appropriate.    Assessment and Plan:     There are no diagnoses linked to this encounter.    Health maintenance:  ***   Labs per orders  Immunizations per orders  Patient counseled about skin care, diet, supplements, and exercise.  Discussed  {GYNCOUNSEL:97280}     Follow-up: No follow-ups on file.

## 2023-02-09 NOTE — PATIENT INSTRUCTIONS
-Please have your knee x-rays done at your earliest convenience.  -Please have your labs done 1 week before you see Dr. Clemons.  -Please try to avoid ibuprofen. You can take 1000mg of tylenol 3 times a day for your pain.  -Take the muscle relaxer 3 times a day as needed.  -Work on getting your CPAP fixed.  -I sent a referral to the therapist. You will get a call to schedule the appointment.  -Follow up with Deonte for a preventative visit in first week of April.

## 2023-02-10 ASSESSMENT — ENCOUNTER SYMPTOMS
SHORTNESS OF BREATH: 0
DIARRHEA: 0
BLURRED VISION: 0
CONSTIPATION: 0
CHILLS: 0
FEVER: 0
HEADACHES: 1
HEMOPTYSIS: 0
SPUTUM PRODUCTION: 0
NAUSEA: 0
DEPRESSION: 0
COUGH: 0
PALPITATIONS: 0
DOUBLE VISION: 0
BLOOD IN STOOL: 0
VOMITING: 0

## 2023-02-10 NOTE — PROGRESS NOTES
Subjective:     CC: Headache    HPI:   Patient is a 32 year old female with a PMH of vitamin D deficiency, morbid obesity, ADHD, and Down syndrome who presents to clinic today for a follow-up visit. Patient is accompanied by her aunt who helped provide the history.    Patient reports that she recently developed a headache 2 days ago when she came back from work. Patient reported that her headache was located in the back of her head and was throbbing in nature. Patient reports that she took 4 tablets of ibuprofen at that time for her headache and it went away. She said that the headache came back 1-2x since then and lasted roughly 1 hour at a time. She denies any similar complaints in the past. She denied any trauma to her head before onset of symptoms. Patient says that her headache was not associated with any vision changes. The only thing that is new is a new bump over the back of her head.    Patient says that her CPAP has been broken recently and that she is in contact with the sleep clinic to get replacement parts.    She says that she has not been exercising recently due to the weather outside and that her diet has been worse recently.     Patient's mother  a few months ago and the patient is currently living with her sister. Patient's aunt says that the patient has been having a hard time dealing with her mother's death.    Patient is interested in working with physical therapy.    No other complaints at this time.      No problems updated.    Health Maintenance: Completed    ROS:  Review of Systems   Constitutional:  Negative for chills and fever.   HENT:  Negative for ear pain, hearing loss and tinnitus.    Eyes:  Negative for blurred vision and double vision.   Respiratory:  Negative for cough, hemoptysis, sputum production and shortness of breath.    Cardiovascular:  Negative for chest pain, palpitations and leg swelling.   Gastrointestinal:  Negative for blood in stool, constipation, diarrhea,  "melena, nausea and vomiting.   Genitourinary:  Negative for dysuria and urgency.   Skin:  Negative for itching and rash.   Neurological:  Positive for headaches.   Psychiatric/Behavioral:  Negative for depression and suicidal ideas.      Objective:     Exam:  /68 (BP Location: Left arm, Patient Position: Sitting, BP Cuff Size: Adult long)   Pulse 65   Temp 36.2 °C (97.2 °F) (Temporal)   Ht 1.372 m (4' 6\")   Wt 110 kg (241 lb 12.8 oz)   SpO2 99%   BMI 58.30 kg/m²  Body mass index is 58.3 kg/m².    Physical Exam  Constitutional:       General: She is not in acute distress.     Appearance: Normal appearance. She is obese. She is not ill-appearing, toxic-appearing or diaphoretic.   HENT:      Head: Normocephalic and atraumatic.      Mouth/Throat:      Mouth: Mucous membranes are moist.      Pharynx: Oropharynx is clear. No oropharyngeal exudate or posterior oropharyngeal erythema.   Eyes:      General: No scleral icterus.        Right eye: No discharge.         Left eye: No discharge.      Conjunctiva/sclera: Conjunctivae normal.   Cardiovascular:      Rate and Rhythm: Normal rate and regular rhythm.      Pulses: Normal pulses.      Heart sounds: Normal heart sounds. No murmur heard.    No friction rub. No gallop.   Pulmonary:      Effort: Pulmonary effort is normal. No respiratory distress.      Breath sounds: Normal breath sounds. No stridor. No wheezing or rhonchi.   Abdominal:      General: Abdomen is flat. Bowel sounds are normal. There is no distension.      Palpations: Abdomen is soft. There is no mass.      Tenderness: There is no abdominal tenderness.      Hernia: No hernia is present.   Musculoskeletal:         General: No swelling or tenderness.      Right lower leg: No edema.      Left lower leg: No edema.      Comments: No shoulder or cervical tenderness to palpation. Muscle tension on occipital portion of head.   Skin:     General: Skin is warm and dry.      Coloration: Skin is not pale.      " Findings: No erythema or rash.   Neurological:      General: No focal deficit present.      Mental Status: She is alert and oriented to person, place, and time. Mental status is at baseline.      Cranial Nerves: No cranial nerve deficit.      Motor: No weakness.         Labs: Please see results section for further information.    Assessment & Plan:   Patient is a 32 year old female with a PMH of vitamin D deficiency, morbid obesity, ADHD, and Down syndrome who presents to clinic today for a follow-up visit.    1. Occipital headache  - Likely due to poor CPAP compliance.   - Advised patient to continue to work to get CPAP fixed.  - Prescribed flexeril for muscle tension.  - Gave patient return precautions if her headache worsens.    2. Grief counseling  - Placed referral for psychology for therapy with patient's recent passing of mother.  - Referral to Psychology    3. Prediabetes  - Due for yearly check.  - HEMOGLOBIN A1C; Future    4. Encounter for screening and preventative care  - Ordered CBC and CMP for regular screening.  - Comp Metabolic Panel; Future  - CBC WITH DIFFERENTIAL; Future    5. Vitamin D deficiency  - Hx of vitamin D deficiency. Will recheck.  - VITAMIN D,25 HYDROXY (DEFICIENCY); Future      I spent a total of 45 minutes with record review, exam, communication with the patient, communication with other providers, and documentation of this encounter.      Return in about 7 weeks (around 4/2/2023).

## 2023-02-21 ENCOUNTER — OFFICE VISIT (OUTPATIENT)
Dept: INTERNAL MEDICINE | Facility: OTHER | Age: 33
End: 2023-02-21
Payer: MEDICAID

## 2023-02-21 VITALS
TEMPERATURE: 97.9 F | HEART RATE: 84 BPM | OXYGEN SATURATION: 95 % | HEIGHT: 55 IN | WEIGHT: 242.8 LBS | SYSTOLIC BLOOD PRESSURE: 137 MMHG | DIASTOLIC BLOOD PRESSURE: 83 MMHG | BODY MASS INDEX: 56.19 KG/M2

## 2023-02-21 DIAGNOSIS — R03.0 ELEVATED BLOOD PRESSURE READING: ICD-10-CM

## 2023-02-21 DIAGNOSIS — G47.33 OSA (OBSTRUCTIVE SLEEP APNEA): ICD-10-CM

## 2023-02-21 DIAGNOSIS — Z63.4 BEREAVEMENT: ICD-10-CM

## 2023-02-21 DIAGNOSIS — R07.89 ATYPICAL CHEST PAIN: ICD-10-CM

## 2023-02-21 PROCEDURE — 99213 OFFICE O/P EST LOW 20 MIN: CPT | Mod: GC

## 2023-02-21 SDOH — SOCIAL STABILITY - SOCIAL INSECURITY: DISSAPEARANCE AND DEATH OF FAMILY MEMBER: Z63.4

## 2023-02-21 NOTE — PATIENT INSTRUCTIONS
-Get labs prior to next appointment  -Monitor blood pressures 2 times a day  -If parts are not correct for CPAP, call your sleep clinic office   -Referral placed to [facility] for counseling. [facility]  -It was nice visiting with you today!

## 2023-02-21 NOTE — LETTER
Marietta Rhodes accompanied Christi Mcgarry to her doctor's appointment on 2/21/2023. They may return to work on 2/22/2023    If you have any questions or concerns, please don't hesitate to call.      Serena Clemons MD

## 2023-02-22 NOTE — PROGRESS NOTES
Established Patient    Patient Care Team:  Serena Clemons M.D. as PCP - General (Internal Medicine)  Preferred home care  as Respiratory Therapist (DME Supplier)    Christi Mcgarry is a 32 y.o. female who presents today with the following Chief Complaint(s): Follow up for Diagnoses of Elevated blood pressure reading, Atypical chest pain, Bereavement, and DELIA (obstructive sleep apnea) were pertinent to this visit.    HPI:  Patient is a 32-year-old female with a past medical history of obesity, prediabetes, ADHD, Down syndrome, DELIA, GERD who presents to the clinic today with her aunt, Marietta Rhodes for follow-up on ED visit on 2/20/2023 at [facility].    Patient was seen at the ED due to left-sided chest pain that woke her up from rest. She states the pain was sharp and non radiating. Did not have any fevers, chills, nausea, vomiting, acid reflux, lightheadedness, dizziness, shortness of breath, palpitations, abdominal pain or weakness. Work-up in the ED was unremarkable. EKG showed no signs of ischemic changes. CXR negative. CBC, CMP, D-dimer and troponins wnl. However, patient noted to have high blood pressure of 198/108. Patient has no hx of HTN. Currently has not had any chest pain since the ED visit.     Has sleep apnea and uses CPAP at night. Is requesting orders for replacement parts.     Patient still grieving the death of her mother. Patient still waiting on referral for grief counseling. Denies any SI or HI. No visual/auditory/tactile hallucinations.     ROS:     General: No fevers, chills, night sweats, weight loss or gain  HEENT: No hearing changes, vision changes, eye pain, ear pain, nasal discharge, sore throat  Neck: No swelling in neck  Pulmonary: No shortness of breath, cough, sputum, or hemoptysis  Cardiovascular: No chest pain, palpitations, or LE swelling  GI: No nausea, vomiting, diarrhea, constipation, abdominal pain, hematochezia or melena  : No dysuria or frequency  Neuro: No focal weakness,  "no general weakness, no headaches, no lightheadedness, no dizziness  Psych: No anxiety or depression    Past Medical History:   Diagnosis Date    ADHD (attention deficit hyperactivity disorder)     Chickenpox     Headache 5/25/2016    Insomnia 5/25/2016    Learning disabilities     Morbid obesity (HCC) 5/25/2016    Nocturnal enuresis 5/25/2016    Obesity     DELIA (obstructive sleep apnea) 5/25/2016    Prediabetes 5/25/2016    Sleep apnea     UTI (lower urinary tract infection) 5/25/2016     Social History     Tobacco Use    Smoking status: Never    Smokeless tobacco: Never   Vaping Use    Vaping Use: Never used   Substance Use Topics    Alcohol use: No    Drug use: No     Current Outpatient Medications   Medication Sig Dispense Refill    cyclobenzaprine (FLEXERIL) 5 mg tablet Take 1 Tablet by mouth 3 times a day as needed for Muscle Spasms or Moderate Pain. 30 Tablet 0    omeprazole (PRILOSEC) 20 MG delayed-release capsule Take 1 Capsule by mouth every day. 90 Capsule 11    metFORMIN ER (GLUCOPHAGE XR) 500 MG TABLET SR 24 HR Take 1 Tablet by mouth every day. 30 Tablet 5     No current facility-administered medications for this visit.       Physical Exam:  /83 (BP Location: Right arm, Patient Position: Sitting, BP Cuff Size: Large adult)   Pulse 84   Temp 36.6 °C (97.9 °F) (Temporal)   Ht 1.372 m (4' 6\")   Wt 110 kg (242 lb 12.8 oz)   SpO2 95%   BMI 58.54 kg/m²   General: Obese female, in no distress.  HEENT: NC/AT, PERRL, EOMI, no scleral icterus or conjunctival pallor  CV:RRR, no murmurs gallops or Rubs, no JVD  Pulm: LCAB, no crackles, rales, rhonchi, or wheezing  GI: Normal bowel sounds, abdomen soft, nontender, nondistended to deep or light palpation in all 4 quadrants, no HSM.  MSK: Radial  pulses 2+ and equal bilaterally.  Strength 5 out of 5 in upper and lower extremities.  No lower extremity edema  Neuro: Patient is alert and oriented x3, no focal deficits  Psych: Appropriate mood and affect "       Assessment and Plan:   1. Elevated blood pressure reading  BP likely elevated due to pain and stress  BP in clinic 137/83  -Discussed with patient to monitor blood pressures twice a day and record in log to bring to next appointment. Demonstrated how to take BP. Handout provided.   -If SBP  elevated above 160 more than 3 times patient to call to start BP medications    2. Atypical chest pain  Resolved  Work-up in ED unremarkable  Likely due to stress, anxiety or GERD     3. Bereavement  Patient still grieving loss of mother  No alarm symptoms  - Referral to Behavioral Health    4. DELIA (obstructive sleep apnea)  - Orders placed for CPAP parts, patient also instructed to call their sleep clinic office   - DME Mask and Supplies    Return in about 6 weeks (around 4/4/2023).    Patient Instructions   -Get labs prior to next appointment  -Monitor blood pressures 2 times a day  -If parts are not correct for CPAP, call your sleep clinic office   -Referral placed to behavioral health for counseling. Check Mychart for updates  -It was nice visiting with you today!      Serena Clemons M.D. PGY-2  [facility]    This note was created using voice recognition software.  While every attempt is made to ensure accuracy of transcription, occasionally errors occur.

## 2023-03-01 ENCOUNTER — OFFICE VISIT (OUTPATIENT)
Dept: INTERNAL MEDICINE | Facility: OTHER | Age: 33
End: 2023-03-01
Payer: MEDICAID

## 2023-03-01 VITALS
HEART RATE: 76 BPM | TEMPERATURE: 97.6 F | BODY MASS INDEX: 55.96 KG/M2 | DIASTOLIC BLOOD PRESSURE: 72 MMHG | HEIGHT: 55 IN | OXYGEN SATURATION: 95 % | WEIGHT: 241.8 LBS | SYSTOLIC BLOOD PRESSURE: 107 MMHG

## 2023-03-01 DIAGNOSIS — G47.33 OBSTRUCTIVE SLEEP APNEA SYNDROME: ICD-10-CM

## 2023-03-01 DIAGNOSIS — I10 EPISODE OF HYPERTENSION: ICD-10-CM

## 2023-03-01 DIAGNOSIS — R22.0 SCALP LUMP: ICD-10-CM

## 2023-03-01 DIAGNOSIS — E66.01 MORBID OBESITY WITH BMI OF 50.0-59.9, ADULT (HCC): ICD-10-CM

## 2023-03-01 DIAGNOSIS — R73.03 PREDIABETES: ICD-10-CM

## 2023-03-01 PROCEDURE — 99214 OFFICE O/P EST MOD 30 MIN: CPT | Performed by: INTERNAL MEDICINE

## 2023-03-01 NOTE — PROGRESS NOTES
Chief Complaint   Patient presents with    Hypertension Follow-up     Has been having high bp     Follow-Up     Needs a letter for bus transportation, her  recommend her to start on Ozempic        HPI: Christi Mcgarry is a 32 y.o. female with following past medical history  who presented to the clinic for the following.    PMH   #Prediabetes -, last A1C 6.2, on metformin  #Vitamin D deficiency   #Lower extremity edema  #Insomnia - not taking any medications  #Morbid obesity   #GERD - on omeprazole.   #Odynophagia  #Down syndrome   #ADHD - not on medications    Here for :-    #Obesity Class 3  -associated with DELIA , prediabetes , is on metformin, not tolerating well, has diarrhea even on lowest dose.  -Requesting to see if she would benefit from GLP-1 agonist.   -No known history of pancreatitis, medullary thyroid cancer.   -Has been to nutritionist in the past- 2021, however christi went only once and she does not remember the details. Christi does have a lot of bread tortillas and sodas     #Scalp abnormality   -Indentation on lower scalp area that care giver noticed recently, Christi unable to tell since when she has had that problem. Denies any pain       #DELIA -  Not on  CPAP yet , DME order was placed on last visit    #Intermittent spikes in blood pressure.   -Christi gets headaches on parietal region with watering of eyes, photophobia, no phonophobia, eye redness, nasal congestion and they last only for a short while and caregiver has noticed that her blood pressures spikes to 200s systolic. She had presented to the ER previously where work up included negative d dimer 0.45mg/l, CBC showed mild leukocytosis, 11.8, MCV : 80 with no anemia, high RDW 15.3%, normal renal function, potassium on lower side of normal 3.9, normal liver function, negative HCG, negative troponin = < 2.5ng/L chest xray showing low lung volumes with atelectasis on basis , EKG showed sinus rhythm, no axis deviation ,  normal QRS, AK interval, no LVH criteria met . Patient has been monitoring blood pressures at home and they have been good except for these episodes. She did have chest pain when she went into the ER however has had no chest pain after that. Caregiver also reports that christi has been under a lot of stress also after recent passing of her mother.   -Does have history of Downs syndrome.   Last Echo from 2009 was wnl - shoed no structural problems.   Caregiver states that she does not cook with a lot of salt generally.     #Letter for bus transportation  -Requesting for letter to provide for special transportation options given that Christi is unable to travel on her own, make complex decisions and may require medical attention given recent headaches and high blood pressures.   -Fran larkin is able to do all ADLs by her self, is reliant on all IADLs including meds on caregiver, however Christi is aware of all her meds and medical history except for some recollection issues.       Patient Active Problem List    Diagnosis Date Noted    Scalp lump 03/01/2023    Morbid obesity with BMI of 50.0-59.9, adult (HCC) 09/08/2022    Down syndrome 04/20/2021    Odynophagia 02/21/2018    Gastroesophageal reflux disease without esophagitis 07/26/2017    Prediabetes 05/25/2016    Vitamin D deficiency 05/25/2016    Impaired glucose tolerance 09/25/2015    Edema of lower extremity 03/13/2015    Obstructive sleep apnea syndrome 06/27/2014    Insomnia 06/27/2014    ADHD 06/27/2014       Current Outpatient Medications   Medication Sig Dispense Refill    Semaglutide (WEGOVY) 0.25 MG/0.5ML Solution Auto-injector Pen-injector Inject 0.5 mL under the skin every 7 days. 6 mL 1    cyclobenzaprine (FLEXERIL) 5 mg tablet Take 1 Tablet by mouth 3 times a day as needed for Muscle Spasms or Moderate Pain. 30 Tablet 0    omeprazole (PRILOSEC) 20 MG delayed-release capsule Take 1 Capsule by mouth every day. (Patient not taking: Reported on 3/1/2023)  "90 Capsule 11     No current facility-administered medications for this visit.       ROS: As per HPI. Additional pertinent systems as noted below.  Constitutional:  Negative for fever, chills   HENT:  Negative for ear pain, sore throat, runny nose   Eyes:  Negative for blurred vision and double vision . Positive as in hpi  Cardiovascular:  Negative for chest pain, palpitations   Respiratory:  Negative for cough, sputum production, shortness of breath   Gastrointestinal: Negative for abdominal pain, nausea, vomiting,  or blood in stools . Positive for diarrhea when  taking metformin   Neurologic: Negative  seizures, weakness. Positive for headache as in hpi. Positive for one episode of lightheadedness when attempting to stand up a few days ago.        /72 (BP Location: Right arm, Patient Position: Sitting, BP Cuff Size: Adult long)   Pulse 76   Temp 36.4 °C (97.6 °F) (Temporal)   Ht 1.372 m (4' 6\")   Wt 110 kg (241 lb 12.8 oz)   SpO2 95%   BMI 58.30 kg/m²     Physical Exam   Constitutional:  Well developed, well nourished. Not in acute distress   Head : Indentation on lower scalp area - occipital region - no inflammation   Cardiovascular:  Regular rate and rhythm, No pedal edema, Intact distal pulses   Respiratory: Clear to auscultation bilaterally, No use of accessory muscles       Note: I have reviewed all pertinent labs and diagnostic tests associated with this visit with specific comments listed under the assessment and plan below    Assessment and Plan    1. Episodes of hypertension  Likely due to insomnia , untreated Sleep apnea- may be leading to spikes on days when she is unable to get sleep vs Adrenal pathology like pheo  - given that it is episodic vs stress/anxiety vs migraines precipitating it. EKG not meeting LVH criteria, however low voltage in lateral precordial leads hence may be masked by body habitus.     Will not start any medications given that she has normal blood pressure and one " instance of possible orthostatic hypotension from description.   Provided information on cpap supplies.   Patient seeing therapist.   Will check for pheo   Conservative measures for headaches at this time     - METANEPHRINES, URINE RANDOM OR 24 HR; Future    2. Morbid obesity with BMI of 50.0-59.9, adult (HCC)  Thyroid function normal in the past  Does not have cushingoid features   Will put in a nutrition referral and also prescribe GLP-1 agonist   Especially given history of prediabetes, intolerance to metformin combined with obesity with other co morbidities including DELIA, episodic hypertension      3. Obstructive sleep apnea syndrome  Provided fax information regarding CPAP supplies     4. Prediabetes  Will trial GLP-1 also for above reasons.   Stop metformin     5. Scalp lump  Likely associated with obesity        Followup: Return in about 10 weeks (around 5/10/2023).        Signed by: Perfecto Crandall M.D.

## 2023-03-01 NOTE — LETTER
March 1, 2023    To Whom It May Concern:         This is confirmation that Christi Mcgarry attended her scheduled appointment with Perfecto Crandall M.D. on 3/01/23.  Christi requires help with transportation as she needs guidance on how to get home and needs help incase there is a medical emergency for at least 2 months.         If you have any questions please do not hesitate to call me at the phone number listed below.    Sincerely,          Perfecto Crandall M.D.  [phone]

## 2023-03-02 DIAGNOSIS — R73.03 PREDIABETES: ICD-10-CM

## 2023-03-02 DIAGNOSIS — E66.01 MORBID OBESITY WITH BMI OF 50.0-59.9, ADULT (HCC): ICD-10-CM

## 2023-03-20 NOTE — PATIENT INSTRUCTIONS
Atopic Dermatitis  Atopic dermatitis is a skin disorder that causes inflammation of the skin. This is the most common type of eczema. Eczema is a group of skin conditions that cause the skin to be itchy, red, and swollen. This condition is generally worse during the cooler winter months and often improves during the warm summer months. Symptoms can vary from person to person.  Atopic dermatitis usually starts showing signs in infancy and can last through adulthood. This condition cannot be passed from one person to another (non-contagious), but is more common in families. Atopic dermatitis may not always be present. When it is present, it is called a flare-up.  What are the causes?  The exact cause of this condition is not known. Flare-ups of the condition may be triggered by:  · Contact with something you are sensitive or allergic to.  · Stress.  · Certain foods.  · Extremely hot or cold weather.  · Harsh chemicals and soaps.  · Dry air.  · Chlorine.  What increases the risk?  This condition is more likely to develop in people who have a personal history or family history of eczema, allergies, asthma, or hay fever.  What are the signs or symptoms?  Symptoms of this condition include:  · Dry, scaly skin.  · Red, itchy rash.  · Itchiness, which can be severe. This may occur before the skin rash. This can make sleeping difficult.  · Skin thickening and cracking can occur over time.  How is this diagnosed?  This condition is diagnosed based on your symptoms, a medical history, and a physical exam.  How is this treated?  There is no cure for this condition, but symptoms can usually be controlled. Treatment focuses on:  · Controlling the itching and scratching. You may be given medicines, such as antihistamines or steroid creams.  · Limiting exposure to things that you are sensitive or allergic to (allergens).  · Recognizing situations that cause stress and developing a plan to manage stress.  If your atopic  dermatitis does not get better with medicines or is all over your body (widespread) , a treatment using a specific type of light (phototherapy) may be used.  Follow these instructions at home:  Skin care  · Keep your skin well-moisturized. This seals in moisture and help prevent dryness.  ¨ Use unscented lotions that have petroleum in them.  ¨ Avoid lotions that contain alcohol and water. They can dry the skin.  · Keep baths or showers short (less than 5 minutes) in warm water. Do not use hot water.  ¨ Use mild, unscented cleansers for bathing. Avoid soap and bubble bath.  ¨ Apply a moisturizer to your skin right after a bath or shower.  ·  Do not apply anything to your skin without checking with your health care provider.  General instructions  · Dress in clothes made of cotton or cotton blends. Dress lightly because heat increases itching.  · When washing your clothes, rinse your clothes twice so all of the soap is removed.  · Avoid any triggers that can cause a flare-up.  · Try to manage your stress.  · Keep your fingernails cut short.  · Avoid scratching. Scratching makes the rash and itching worse. It may also result in a skin infection (impetigo) due to a break in the skin caused by scratching.  · Take or apply over-the-counter and prescription medicines only as told by your health care provider.  · Keep all follow-up visits as told by your health care provider. This is important.  · Do not be around people who have cold sores or fever blisters. If you get the infection, it may cause your atopic dermatitis to worsen.  Contact a health care provider if:  · Your itching interferes with sleep.  · Your rash gets worse or is not better within one week of starting treatment.  · You have a fever.  · You have a rash flare-up after having contact with someone who has cold sores or fever blisters.  Get help right away if:  · You develop pus or soft yellow scabs in the rash area.  Summary  · This condition causes a red  rash and itchy, dry, scaly skin.  · Treatment focuses on controlling the itching and scratching, limiting exposure to things that you are sensitive or allergic to (allergens), and recognizing situations that cause stress and developing a plan to manage stress.  · Keep your skin well-moisturized.  · Keep baths or showers less than 5 minutes.  This information is not intended to replace advice given to you by your health care provider. Make sure you discuss any questions you have with your health care provider.  Document Released: 12/15/2001 Document Revised: 05/25/2017 Document Reviewed: 07/21/2014  [employer] Patient Education © 2017 [employer]       Opzelura Counseling:  I discussed with the patient the risks of Opzelura including but not limited to nasopharngitis, bronchitis, ear infection, eosinophila, hives, diarrhea, folliculitis, tonsillitis, and rhinorrhea.  Taken orally, this medication has been linked to serious infections; higher rate of mortality; malignancy and lymphoproliferative disorders; major adverse cardiovascular events; thrombosis; thrombocytopenia, anemia, and neutropenia; and lipid elevations.

## 2023-03-24 ENCOUNTER — TELEPHONE (OUTPATIENT)
Dept: INTERNAL MEDICINE | Facility: OTHER | Age: 33
End: 2023-03-24

## 2023-03-24 NOTE — TELEPHONE ENCOUNTER
Called Christi to let her know that wesgovy is not covered, however caretaker during appointment had mentioned that she has some resources to get discount. Caretaker not with Christi at this time.   Asked christi to pass on msg.   Will attempt to call again on monday

## 2023-04-03 RX ORDER — SEMAGLUTIDE 0.25 MG/.5ML
INJECTION, SOLUTION SUBCUTANEOUS
Qty: 1 EACH | Refills: 3 | Status: SHIPPED
Start: 2023-04-03 | End: 2023-07-31 | Stop reason: SDUPTHER

## 2023-04-29 NOTE — TELEPHONE ENCOUNTER
Called Christi again today, again not with caretaker/and.   Medicated with her that we shall talk about the GLP-1 agonist on the next upcoming visit on the 16th.

## 2023-05-16 ENCOUNTER — OFFICE VISIT (OUTPATIENT)
Dept: INTERNAL MEDICINE | Facility: OTHER | Age: 33
End: 2023-05-16
Payer: MEDICAID

## 2023-05-16 VITALS
HEIGHT: 55 IN | OXYGEN SATURATION: 97 % | SYSTOLIC BLOOD PRESSURE: 133 MMHG | BODY MASS INDEX: 56.56 KG/M2 | TEMPERATURE: 98 F | WEIGHT: 244.4 LBS | DIASTOLIC BLOOD PRESSURE: 57 MMHG | HEART RATE: 73 BPM

## 2023-05-16 DIAGNOSIS — Z71.89 GRIEF COUNSELING: ICD-10-CM

## 2023-05-16 DIAGNOSIS — K21.9 GASTROESOPHAGEAL REFLUX DISEASE WITHOUT ESOPHAGITIS: ICD-10-CM

## 2023-05-16 DIAGNOSIS — E66.01 MORBID OBESITY WITH BMI OF 50.0-59.9, ADULT (HCC): ICD-10-CM

## 2023-05-16 DIAGNOSIS — R73.03 PREDIABETES: ICD-10-CM

## 2023-05-16 PROCEDURE — 3075F SYST BP GE 130 - 139MM HG: CPT

## 2023-05-16 PROCEDURE — 99214 OFFICE O/P EST MOD 30 MIN: CPT | Mod: GC

## 2023-05-16 PROCEDURE — 3078F DIAST BP <80 MM HG: CPT

## 2023-05-16 RX ORDER — OMEPRAZOLE 20 MG/1
20 CAPSULE, DELAYED RELEASE ORAL DAILY
Qty: 90 CAPSULE | Refills: 11 | Status: SHIPPED | OUTPATIENT
Start: 2023-05-16 | End: 2023-09-15

## 2023-05-16 RX ORDER — HYDROXYZINE HYDROCHLORIDE 25 MG/1
25 TABLET, FILM COATED ORAL 3 TIMES DAILY PRN
Qty: 30 TABLET | Refills: 0 | Status: SHIPPED | OUTPATIENT
Start: 2023-05-16 | End: 2023-05-17

## 2023-05-16 RX ORDER — ORAL SEMAGLUTIDE 3 MG/1
1 TABLET ORAL DAILY
Qty: 30 TABLET | Refills: 1 | Status: SHIPPED | OUTPATIENT
Start: 2023-05-16 | End: 2023-07-31 | Stop reason: SDUPTHER

## 2023-05-16 NOTE — PATIENT INSTRUCTIONS
-Get labs prior to next visit  -Use atarax as needed for anxiety  -Check Mychart for behavioral health counseling scheduling instructions  -Conisder Rybelsus daily. Stop if experiencing any adverse side effects we discussed.  -Avoid foods that cause acid reflux  -Exercise at least 30 minutes a day  -It was nice visiting with you!

## 2023-05-16 NOTE — LETTER
May 16, 2023    To Whom It May Concern:         This is confirmation that Christi Maya Mcgarry attended her scheduled appointment with Serena Clemons M.D. on 5/16/23. Please excuse her from work when she needs time to deal with grief.          If you have any questions please do not hesitate to call me at the phone number listed below.    Sincerely,          Serena Clemons M.D.  [phone]

## 2023-05-16 NOTE — PROGRESS NOTES
"    Established Patient    Patient Care Team:  Serena Clemons M.D. as PCP - General (Internal Medicine)  Preferred home care  as Respiratory Therapist (DME Supplier)    Christi Mcgarry is a 32 y.o. female who presents today with the following Chief Complaint(s): Follow up for Diagnoses of Morbid obesity with BMI of 50.0-59.9, adult (HCC), Prediabetes, Gastroesophageal reflux disease without esophagitis, and Grief counseling were pertinent to this visit.    HPI:  Patient is a 32-year-old female with a past medical history of obesity, prediabetes, ADHD, Down syndrome, DELIA on CPAP, GERD who presents to the clinic today with her aunt, Marietta Rhodes for follow-up.    Last A1c 6.2 on 4/7/2022.  Patient was previously on metformin  mg daily and switched last visit to Wegovy to aid in weight loss at the suggestion of her . Insurance denied medication. Her weight has been stable. Does eat \"junk food\", but is cutting down. Does exercise for at least 30 minutes a day. 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 Was referred to nutrition last visit, but has not scheduled appointment yet. Has seen nutrition in the past, but unable to recall what was discussed.      GERD symptoms currently under control through avoiding trigger foods. Does eat tomatoes, onions, coffee and sodas. Has been having symptoms at least 1 time per week.  Denies any sore throat, cough, difficulty swallowing, chest pain, nausea, vomiting or abdominal pain.     Patient would like new referral for counseling as she didn't like counseling through telemedicine. Her mother passed away 7 months ago and she is having a hard time grieving. Has began to have \"anxiety attacks\" prior to bedtime thinking about her mother, which is interfering with her sleep. No visual/auditory/tactile hallucinations.     ROS:     General: No fevers, chills, night sweats, weight loss or gain  HEENT: No hearing changes, vision " "changes, eye pain, ear pain, nasal discharge, sore throat  Neck: No swelling in neck  Pulmonary: No shortness of breath, cough, sputum, or hemoptysis  Cardiovascular: No chest pain, palpitations, or LE swelling  GI: No nausea, vomiting, diarrhea, constipation, abdominal pain, hematochezia or melena  : No dysuria or frequency  Neuro: No focal weakness, no general weakness, no headaches, no lightheadedness, no dizziness  Psych: No anxiety or depression    Past Medical History:   Diagnosis Date    ADHD (attention deficit hyperactivity disorder)     Chickenpox     Headache 5/25/2016    Insomnia 5/25/2016    Learning disabilities     Morbid obesity (HCC) 5/25/2016    Nocturnal enuresis 5/25/2016    Obesity     DELIA (obstructive sleep apnea) 5/25/2016    Prediabetes 5/25/2016    Sleep apnea     UTI (lower urinary tract infection) 5/25/2016     Social History     Tobacco Use    Smoking status: Never    Smokeless tobacco: Never   Vaping Use    Vaping Use: Never used   Substance Use Topics    Alcohol use: No    Drug use: No     Current Outpatient Medications   Medication Sig Dispense Refill    hydrOXYzine HCl (ATARAX) 25 MG Tab Take 1 Tablet by mouth 3 times a day as needed for Itching. 30 Tablet 0    omeprazole (PRILOSEC) 20 MG delayed-release capsule Take 1 Capsule by mouth every day. 90 Capsule 11    Semaglutide (RYBELSUS) 3 MG Tab Take 1 mg by mouth every day. 30 Tablet 1    cyclobenzaprine (FLEXERIL) 5 mg tablet Take 1 Tablet by mouth 3 times a day as needed for Muscle Spasms or Moderate Pain. 30 Tablet 0    WEGOVY 0.25 MG/0.5ML Solution Auto-injector Pen-injector INYECTE 0.5 ML UNDER THE SKIN EVERY 7 DAYS (Patient not taking: Reported on 5/16/2023) 1 Each 3     No current facility-administered medications for this visit.       Physical Exam:  /57 (BP Location: Left arm, Patient Position: Sitting, BP Cuff Size: Adult long)   Pulse 73   Temp 36.7 °C (98 °F) (Temporal)   Ht 1.372 m (4' 6\")   Wt 111 kg (244 lb " 6.4 oz)   SpO2 97%   BMI 58.93 kg/m²   General: Well developed, well nourished female, in no distress.  HEENT: NC/AT, PERRL, EOMI, no scleral icterus or conjunctival pallor  CV:RRR, no murmurs gallops or Rubs  Pulm: LCAB, no crackles, rales, rhonchi, or wheezing  GI: Normal bowel sounds, abdomen soft, nontender, nondistended to deep or light palpation in all 4 quadrants, no HSM.  MSK: Radial pulses 2+ and equal bilaterally.  Strength 5 out of 5 in upper and lower extremities.  No lower extremity edema  Neuro: Patient is alert and oriented x3, no focal deficits  Psych: Appropriate mood and affect       Assessment and Plan:   1. Morbid obesity with BMI of 50.0-59.9, adult (HCC)  Weight stable  Discussed with patient and aunt to continue making dietary change and continue exercise  Discussed with patient and aunt to schedule appointment with nutrition   Will trial Rybelsus 3 mg daily to treat prediabetes and aid in weightloss  Will check labs:  - HEMOGLOBIN A1C; Future  - Lipid Profile; Future  - Comp Metabolic Panel; Future  - TSH+FREE T4  - Semaglutide (RYBELSUS) 3 MG Tab; Take 1 mg by mouth every day.  Dispense: 30 Tablet; Refill: 1    2. Prediabetes  A1c 6.2 on 4/7/2022  Prescribed Wegovy last visit and unable to take due to insurance denial  Will trial Rybelsus 3 mg daily to treat prediabetes and aid in weightloss  Recheck Hemoglobin A1c:  - Semaglutide (RYBELSUS) 3 MG Tab; Take 1 mg by mouth every day.  Dispense: 30 Tablet; Refill: 1  - HEMOGLOBIN A1C; Future    3. Gastroesophageal reflux disease without esophagitis  Controlled with lifestyle modifications and does experience symptoms at least 1x per week when she eats trigger foods  Continue lifestyle modifications.  Handout provided  Continue omeprazole 20 mg  Can consider discontinuing medication next visit if symptoms not occurring frequently  - omeprazole (PRILOSEC) 20 MG delayed-release capsule; Take 1 Capsule by mouth every day.  Dispense: 90 Capsule;  Refill: 11    4. Grief counseling  Patient still grieving loss of mother and now experiencing anxiety  Requesting new referral as she was not happy about telemedicine visits for counseling  Prescribed Atarax 25 mg prn for anxiety  - Referral to Behavioral Health  - hydrOXYzine HCl (ATARAX) 25 MG Tab; Take 1 Tablet by mouth 3 times a day as needed for Anxiety.  Dispense: 30 Tablet; Refill: 0    Return in about 6 weeks (around 6/27/2023).    Patient Instructions   -Get labs prior to next visit  -Use atarax as needed for anxiety  -Check Mychart for behavioral health counseling scheduling instructions  -Conisder Rybelsus daily. Stop if experiencing any adverse side effects we discussed.  -Avoid foods that cause acid reflux  -Exercise at least 30 minutes a day  -It was nice visiting with you!      Serena Clemons M.D. PGY-2  [facility]    This note was created using voice recognition software.  While every attempt is made to ensure accuracy of transcription, occasionally errors occur.

## 2023-05-17 RX ORDER — HYDROXYZINE HYDROCHLORIDE 25 MG/1
25 TABLET, FILM COATED ORAL 3 TIMES DAILY PRN
Qty: 30 TABLET | Refills: 0 | Status: SHIPPED | OUTPATIENT
Start: 2023-05-17 | End: 2023-09-15

## 2023-07-31 ENCOUNTER — OFFICE VISIT (OUTPATIENT)
Dept: INTERNAL MEDICINE | Facility: OTHER | Age: 33
End: 2023-07-31
Payer: MEDICAID

## 2023-07-31 VITALS
BODY MASS INDEX: 55.48 KG/M2 | HEART RATE: 66 BPM | SYSTOLIC BLOOD PRESSURE: 127 MMHG | HEIGHT: 56 IN | OXYGEN SATURATION: 98 % | DIASTOLIC BLOOD PRESSURE: 74 MMHG | WEIGHT: 246.6 LBS | TEMPERATURE: 98 F

## 2023-07-31 DIAGNOSIS — H81.10 BENIGN PAROXYSMAL VERTIGO, UNSPECIFIED LATERALITY: ICD-10-CM

## 2023-07-31 DIAGNOSIS — K21.9 GASTROESOPHAGEAL REFLUX DISEASE WITHOUT ESOPHAGITIS: ICD-10-CM

## 2023-07-31 DIAGNOSIS — R03.0 ELEVATED BLOOD PRESSURE READING: ICD-10-CM

## 2023-07-31 DIAGNOSIS — E66.01 MORBID OBESITY WITH BMI OF 50.0-59.9, ADULT (HCC): ICD-10-CM

## 2023-07-31 DIAGNOSIS — R73.03 PREDIABETES: ICD-10-CM

## 2023-07-31 PROCEDURE — 3078F DIAST BP <80 MM HG: CPT

## 2023-07-31 PROCEDURE — 3074F SYST BP LT 130 MM HG: CPT

## 2023-07-31 PROCEDURE — 99214 OFFICE O/P EST MOD 30 MIN: CPT | Mod: GC

## 2023-07-31 RX ORDER — ORAL SEMAGLUTIDE 3 MG/1
1 TABLET ORAL DAILY
Qty: 30 TABLET | Refills: 1 | Status: SHIPPED | OUTPATIENT
Start: 2023-07-31 | End: 2023-09-15

## 2023-07-31 RX ORDER — SEMAGLUTIDE 0.25 MG/.5ML
INJECTION, SOLUTION SUBCUTANEOUS
Qty: 1 EACH | Refills: 3 | Status: SHIPPED | OUTPATIENT
Start: 2023-07-31 | End: 2023-09-15

## 2023-07-31 NOTE — PROGRESS NOTES
"    Established Patient    Patient Care Team:  Serena Clemons M.D. as PCP - General (Internal Medicine)  Preferred home care  as Respiratory Therapist (DME Supplier)    Christi Mcgarry is a 32 y.o. female who presents today with the following Chief Complaint(s): Follow up for Diagnoses of Morbid obesity with BMI of 50.0-59.9, adult (HCC), Prediabetes, Benign paroxysmal vertigo, unspecified laterality, Gastroesophageal reflux disease without esophagitis, and Elevated blood pressure reading were pertinent to this visit.    HPI:  Patient is a 32-year-old female with a past medical history of obesity, prediabetes, ADHD, Down syndrome, DELIA on CPAP and GERD who presents to the clinic today with her aunt, Marietta Rhodes for follow-up on lab results.     Labs 5/31/2023:  Lipid panel:   HDL 38 LDL 75   CMP wnl with glucose 112  A1c 6.0  TSH 1.18 and FT4 1.0    Patient continues to improve her diet and work-out with the help of her aunt. Has been decreasing her \"junk food\" intake and has stopped eating after 8 pm. Her aunt is cooking healthy meals at home. Has increased her exercise to 45 minutes daily. Exercises by walking. Does also go swimming once a week. Unable to schedule an appointment with nutrition and no longer wishes to visit with them feeling it did not help in the past. Prescribed Ryblesus last visit, but has been having difficulty filling prescription.     GERD symptoms controlled with omeprazole 20 mg daily. Experiencing symptoms 1-2 times per week after eating acidic foods. Tries to avoid them at times.    Was seen at the ED 7/20/2023 for dizziness. [place] room spin when she moved her head or stood up. Patient reports blood work and CT head was negative. She was prescribed meclizine, however has been taking dramamine that was given to her by a cousin. Since then has had no symptoms.     BP in clinic 149/53 with repeat 127/74. Home blood pressures range from 120-130s/60s-70s. No history of " hypertension.    ROS:     General: No fevers, chills, night sweats, weight loss or gain  HEENT: No hearing changes, vision changes, eye pain, ear pain, nasal discharge, sore throat  Neck: No swelling in neck  Pulmonary: No shortness of breath, cough, sputum, or hemoptysis  Cardiovascular: No chest pain, palpitations, or LE swelling  GI: No nausea, vomiting, diarrhea, constipation, abdominal pain, hematochezia or melena  : No dysuria or frequency  Neuro: No focal weakness, no general weakness, no headaches, no lightheadedness, no dizziness  Psych: No anxiety or depression    Past Medical History:   Diagnosis Date    ADHD (attention deficit hyperactivity disorder)     Chickenpox     Headache 5/25/2016    Insomnia 5/25/2016    Learning disabilities     Morbid obesity (HCC) 5/25/2016    Nocturnal enuresis 5/25/2016    Obesity     DELIA (obstructive sleep apnea) 5/25/2016    Prediabetes 5/25/2016    Sleep apnea     UTI (lower urinary tract infection) 5/25/2016     Social History     Tobacco Use    Smoking status: Never    Smokeless tobacco: Never   Vaping Use    Vaping Use: Never used   Substance Use Topics    Alcohol use: No    Drug use: No     Current Outpatient Medications   Medication Sig Dispense Refill    Semaglutide (WEGOVY) 0.25 MG/0.5ML Solution Auto-injector Pen-injector INYECTE 0.5 ML UNDER THE SKIN EVERY 7 DAYS 1 Each 3    hydrOXYzine HCl (ATARAX) 25 MG Tab Take 1 Tablet by mouth 3 times a day as needed for Anxiety. 30 Tablet 0    omeprazole (PRILOSEC) 20 MG delayed-release capsule Take 1 Capsule by mouth every day. 90 Capsule 11    Semaglutide (RYBELSUS) 3 MG Tab Take 1 mg by mouth every day. 30 Tablet 1    cyclobenzaprine (FLEXERIL) 5 mg tablet Take 1 Tablet by mouth 3 times a day as needed for Muscle Spasms or Moderate Pain. 30 Tablet 0     No current facility-administered medications for this visit.       Physical Exam:  /74 (BP Location: Left arm, Patient Position: Sitting, BP Cuff Size: Large  "adult)   Pulse 66   Temp 36.7 °C (98 °F) (Temporal)   Ht 1.412 m (4' 7.59\")   Wt 112 kg (246 lb 9.6 oz)   SpO2 98%   BMI 56.10 kg/m²   General: Well developed, obese female, in no distress.  HEENT: NC/AT, PERRL, EOMI, no scleral icterus or conjunctival pallor  Neck: Supple, No cervical or supraclavicular LAD  CV:RRR, no murmurs gallops or Rubs, no JVD  Pulm: LCAB, no crackles, rales, rhonchi, or wheezing  GI: Normal bowel sounds, abdomen soft, nontender, distended (obese) to deep or light palpation in all 4 quadrants, no HSM.  MSK: Radial and dorsalis pedis pulses 2+ and equal bilaterally, respectively.  Strength 5 out of 5 in upper and lower extremities.  No lower extremity edema  Neuro: Patient is alert and oriented x3, no focal deficits  Psych: Appropriate mood and affect       Assessment and Plan:   1. Morbid obesity with BMI of 50.0-59.9, adult (HCC)  BMI 56.10  Weight stable   Continue making dietary changes and continue exercise  Difficulty obtaining previous prescriptions to aid in weightloss and treat prediabetes. Spoke with pharmacy about Rybelsus and Wegovy. Rybelsus needs a prior authorization, which we will complete once received from the pharmacy. Wegovy is not covered by insurance and currently back ordered.   Discussed with patient and her aunt that given her BMI and certain medical issues she does qualify for bariatric surgery. Patient agreeable to referral.   - Referral to Bariatric Surgery  - Semaglutide (WEGOVY) 0.25 MG/0.5ML Solution Auto-injector Pen-injector; INYECTE 0.5 ML UNDER THE SKIN EVERY 7 DAYS  Dispense: 1 Each; Refill: 3    2. Prediabetes  Most recent A1c 6.0, 5/31/2023  Continue making dietary changes and continue exercise  -See plan above  - Semaglutide (RYBELSUS) 3 MG Tab; Take 1 mg by mouth every day.  Dispense: 30 Tablet; Refill: 1    3. Benign paroxysmal vertigo, unspecified laterality  Controlled  Seen 7/20 at ED for dizziness. Diagnosed with BPPV and prescribed " meclizine.  Has been taking dramamine for symptoms.     4. Gastroesophageal reflux disease without esophagitis  Controlled with lifestyle modifications and does experience symptoms at least 1x per week when she eats trigger foods  Continue lifestyle modifications.   Continue omeprazole 20 mg    5. Elevated blood pressure reading  /53, repeat 127/74.  Home blood pressures 120s-130s/60s-70s  Continue to monitor blood pressures at home 1-2 weekly    Return in about 6 weeks (around 9/11/2023).    Patient Instructions   -Continue diet and exercise. Try to increase intensity of walking.  -Referral to bariatric surgery, please follow-up to schedule  -I will call and uptdate about medication for weight loss  -Continue omeprazole for acid reflux  -Continue watching blood pressure at home, check at least 1-2 times a week  -Schedule pap smear for next visit   -It was nice visiting with you today!      Serena Clemons M.D. PGY-3  [facility]    This note was created using voice recognition software.  While every attempt is made to ensure accuracy of transcription, occasionally errors occur.

## 2023-07-31 NOTE — PATIENT INSTRUCTIONS
-Continue diet and exercise. Try to increase intensity of walking.  -Referral to bariatric surgery, please follow-up to schedule  -I will call and uptdate about medication for weight loss  -Continue omeprazole for acid reflux  -Continue watching blood pressure at home, check at least 1-2 times a week  -Schedule pap smear for next visit   -It was nice visiting with you today!

## 2023-09-15 ENCOUNTER — OFFICE VISIT (OUTPATIENT)
Dept: INTERNAL MEDICINE | Facility: OTHER | Age: 33
End: 2023-09-15
Payer: MEDICAID

## 2023-09-15 VITALS
WEIGHT: 248.6 LBS | SYSTOLIC BLOOD PRESSURE: 120 MMHG | TEMPERATURE: 97.4 F | DIASTOLIC BLOOD PRESSURE: 71 MMHG | HEART RATE: 62 BPM | BODY MASS INDEX: 55.92 KG/M2 | HEIGHT: 56 IN | OXYGEN SATURATION: 94 %

## 2023-09-15 DIAGNOSIS — Z01.419 PAP TEST, AS PART OF ROUTINE GYNECOLOGICAL EXAMINATION: ICD-10-CM

## 2023-09-15 DIAGNOSIS — Z23 NEED FOR VACCINATION: ICD-10-CM

## 2023-09-15 PROCEDURE — 90471 IMMUNIZATION ADMIN: CPT | Performed by: INTERNAL MEDICINE

## 2023-09-15 PROCEDURE — 3078F DIAST BP <80 MM HG: CPT

## 2023-09-15 PROCEDURE — 99395 PREV VISIT EST AGE 18-39: CPT | Mod: EP,25,GC

## 2023-09-15 PROCEDURE — 90686 IIV4 VACC NO PRSV 0.5 ML IM: CPT | Performed by: INTERNAL MEDICINE

## 2023-09-15 PROCEDURE — 3074F SYST BP LT 130 MM HG: CPT

## 2023-09-15 NOTE — PROGRESS NOTES
Established Patient    Christi presents today with the following:    CC: Pap smear     HPI: 32 y.o. female for annual routine gynecologic exam  Chief Complaint   Patient presents with    Follow-Up    Gynecologic Exam       OB History   No obstetric history on file.      Last Pap:   Social History     Substance and Sexual Activity   Sexual Activity Not on file     H/O Abnormal Pap: No  She  reports that she has never smoked. She has never used smokeless tobacco.  Patient has GYN provider: No  /Para:  No  Gyn Surgery:  No  Current Contraceptive Method:  None. Not currently sexually active.  Last menstrual period:  2023.  Periods regular. heavy bleeding. Cramping is mild.   She does not take OTC analgesics for cramps.  No significant bloating/fluid retention, pelvic pain, or dyspareunia. No vaginal discharge  Post-menopausal bleeding: Not applicable   Urinary incontinence: None   Folate intake: Not applicable          Allergies: Patient has no known allergies.     ROS:    Menses every month with 28 days heavy bleeding.  Cramping is mild.   She does not take OTC analgesics for cramping  Reports none menopause symptoms of hot flashes, night sweats, sleep disruption, mood changes, vaginal dryness.   No significant bloating/fluid retention, pelvic pain, or dyspareunia. No vaginal discharge   No breast tenderness, mass, nipple discharge, changes in size or contour, or abnormal cyclic discomfort.  No urinary tract symptoms, no incontinence.   No abdominal pain, change in bowel habits, black or bloody stools.    No unusual headaches, no visual changes, menstrual migraines   No prolonged cough. No dyspnea or chest pain on exertion.  No depression, labile mood, anxiety ,libido changes, insomnia.  No new/concerning skin lesions, concerns.     Exercise: minimal exercise  Preventive Care:  Health Maintenance Topics with due status: Overdue       Topic Date Due    Hepatitis C Screening Never done    Chickenpox  "Vaccine (Varicella) Never done    COVID-19 Vaccine 09/17/2021    Cervical Cancer Screening 08/17/2023    Influenza Vaccine 09/01/2023         Patient Active Problem List    Diagnosis Date Noted    Scalp lump 03/01/2023    Morbid obesity with BMI of 50.0-59.9, adult (HCC) 09/08/2022    Down syndrome 04/20/2021    Odynophagia 02/21/2018    Gastroesophageal reflux disease without esophagitis 07/26/2017    Prediabetes 05/25/2016    Vitamin D deficiency 05/25/2016    Impaired glucose tolerance 09/25/2015    Edema of lower extremity 03/13/2015    Obstructive sleep apnea syndrome 06/27/2014    Insomnia 06/27/2014    ADHD 06/27/2014         Social History     Tobacco Use    Smoking status: Never    Smokeless tobacco: Never   Vaping Use    Vaping Use: Never used   Substance Use Topics    Alcohol use: No    Drug use: No         Current medicines (including changes today)  No current outpatient medications on file.     No current facility-administered medications for this visit.     She  has a past medical history of ADHD (attention deficit hyperactivity disorder), Chickenpox, Headache (5/25/2016), Insomnia (5/25/2016), Learning disabilities, Morbid obesity (HCC) (5/25/2016), Nocturnal enuresis (5/25/2016), Obesity, DELIA (obstructive sleep apnea) (5/25/2016), Prediabetes (5/25/2016), Sleep apnea, and UTI (lower urinary tract infection) (5/25/2016).    She has no past medical history of Diabetes ([facility]).  She  has a past surgical history that includes tonsillectomy and adenoidectomy (4/15/2013).     Family History:   Family History   Problem Relation Age of Onset    Sleep Apnea Neg Hx        Family History negative for : Breast, Colon, Lung, or female organ cancer, thyroid disease, CAD, Diabetes, osteoporosis.     OBJECTIVE:   /71 (BP Location: Right arm, Patient Position: Sitting, BP Cuff Size: Adult)   Pulse 62   Temp 36.3 °C (97.4 °F) (Temporal)   Ht 1.412 m (4' 7.59\")   Wt 113 kg (248 lb 9.6 oz)   SpO2 94%   BMI " 56.56 kg/m²   Body mass index is 56.56 kg/m².    Physical Exam:  General: no acute distress, sitting comfortably  HEAD AND NECK:  Ears normal.  Throat, oral cavity and tongue normal.    Neck supple. No adenopathy or masses in the neck or supraclavicular regions.  No carotid bruits. No thyromegaly.   CHEST:  Clear, good air entry, no wheezes or rales.   HEART:  Regular rate and rhythm.  S1 and S2 normal.   No edema or JVD.   ABDOMEN:  Soft without tenderness, guarding, mass or organomegaly.  No CVA tenderness or inguinal adenopathy.   EXTREMITIES:  Extremities, reflexes and peripheral pulses are normal. S  SKIN: color normal, vascularity normal, no edema, temperature normal   No rashes or suspicious skin lesions noted.  NEURO: Cranial nerves are normal. DTR's normal and symmetric.    PSYCH: cooperative, euthymic    Breast Exam: Performed with instruction during examination. No axillary lymphadenopathy, no skin changes, no dominant masses. No nipple retraction  Pelvic Exam -  Normal external genitalia with no lesions. Normal vaginal mucosa with normal rugation and no discharge. Cervix with no visible lesions. No cervical motion tenderness. Uterus is normal sized with no masses. No adnexal tenderness or enlargement appreciated. Sure Path Pap is obtained, vaginal swab is obtained and specimen(s) sent to lab  Rectal: deferred      Note: I have reviewed all pertinent labs and diagnostic tests associated with this visit with specific comments listed under the assessment and plan below    Assessment and Plan    1. Pap test, as part of routine gynecological examination  THINPREP PAP WITH HPV      2. Need for vaccination  Influenza Vaccine Quad Injection (PF)          #Pap test  -Will call patient with results  -If insufficient amount of cervical cells collected and HPV negative, will repeat Pap smear in 2 to 3 years       Followup: Return in about 3 months (around 12/15/2023).      Signed by: Serena Clemons  M.D.        Discussed  diet and exercise   Follow-up in 1 years for next Gyn exam and 5 years for next Pap.   Next office visit for recheck of chronic medical conditions is due in 2 months

## 2023-09-15 NOTE — LETTER
September 15, 2023    To Whom It May Concern:         This is confirmation that Christi Mcgarry attended her scheduled appointment with Serena Clemons M.D. on 9/15/23 with the assistance of her aunt, Marietta Rhodes. Please excuse Marietta Rhodes from work.          If you have any questions please do not hesitate to call me at the phone number listed below.    Sincerely,          Serena Clemons M.D.  [phone]

## 2023-09-15 NOTE — PATIENT INSTRUCTIONS
-Follow-up with bariatric surgery. We can visit after surgery.   -Will call you with results. It is normal to experience some spotting after Pap exam.   -It was nice visiting with you today!

## 2023-09-21 DIAGNOSIS — Z01.419 PAP TEST, AS PART OF ROUTINE GYNECOLOGICAL EXAMINATION: ICD-10-CM

## 2023-10-19 ENCOUNTER — HOSPITAL ENCOUNTER (OUTPATIENT)
Dept: RADIOLOGY | Facility: MEDICAL CENTER | Age: 33
End: 2023-10-19
Attending: SURGERY
Payer: MEDICAID

## 2023-10-19 DIAGNOSIS — Z01.818 PREOP EXAMINATION: ICD-10-CM

## 2023-10-19 DIAGNOSIS — I10 ESSENTIAL HYPERTENSION, MALIGNANT: ICD-10-CM

## 2023-10-19 DIAGNOSIS — K21.9 GASTROESOPHAGEAL REFLUX DISEASE, UNSPECIFIED WHETHER ESOPHAGITIS PRESENT: ICD-10-CM

## 2023-10-19 DIAGNOSIS — E66.01 MORBID OBESITY (HCC): ICD-10-CM

## 2023-10-19 PROCEDURE — 71046 X-RAY EXAM CHEST 2 VIEWS: CPT

## 2023-10-30 ENCOUNTER — HOSPITAL ENCOUNTER (OUTPATIENT)
Dept: LAB | Facility: MEDICAL CENTER | Age: 33
End: 2023-10-30
Attending: SURGERY
Payer: MEDICAID

## 2023-10-30 ENCOUNTER — HOSPITAL ENCOUNTER (OUTPATIENT)
Dept: RADIOLOGY | Facility: MEDICAL CENTER | Age: 33
End: 2023-10-30
Attending: SURGERY
Payer: MEDICAID

## 2023-10-30 DIAGNOSIS — I10 ESSENTIAL HYPERTENSION, MALIGNANT: ICD-10-CM

## 2023-10-30 DIAGNOSIS — E66.01 MORBID OBESITY (HCC): ICD-10-CM

## 2023-10-30 DIAGNOSIS — Z01.818 PREOP EXAMINATION: ICD-10-CM

## 2023-10-30 DIAGNOSIS — K21.9 GASTROESOPHAGEAL REFLUX DISEASE, UNSPECIFIED WHETHER ESOPHAGITIS PRESENT: ICD-10-CM

## 2023-10-30 LAB
EST. AVERAGE GLUCOSE BLD GHB EST-MCNC: 137 MG/DL
HBA1C MFR BLD: 6.4 % (ref 4–5.6)
TSH SERPL DL<=0.005 MIU/L-ACNC: 0.99 UIU/ML (ref 0.38–5.33)

## 2023-10-30 PROCEDURE — 74240 X-RAY XM UPR GI TRC 1CNTRST: CPT

## 2023-10-30 PROCEDURE — 84443 ASSAY THYROID STIM HORMONE: CPT

## 2023-10-30 PROCEDURE — 83036 HEMOGLOBIN GLYCOSYLATED A1C: CPT

## 2023-10-30 PROCEDURE — 36415 COLL VENOUS BLD VENIPUNCTURE: CPT

## 2023-11-14 ENCOUNTER — HOSPITAL ENCOUNTER (OUTPATIENT)
Dept: CARDIOLOGY | Facility: MEDICAL CENTER | Age: 33
End: 2023-11-14
Attending: SURGERY
Payer: MEDICAID

## 2023-11-14 LAB — EKG IMPRESSION: NORMAL

## 2023-11-14 PROCEDURE — 93010 ELECTROCARDIOGRAM REPORT: CPT | Performed by: INTERNAL MEDICINE

## 2023-11-14 PROCEDURE — 93005 ELECTROCARDIOGRAM TRACING: CPT | Performed by: SURGERY

## 2023-12-05 ENCOUNTER — APPOINTMENT (OUTPATIENT)
Dept: ADMISSIONS | Facility: MEDICAL CENTER | Age: 33
DRG: 621 | End: 2023-12-05
Attending: SURGERY
Payer: MEDICAID

## 2023-12-13 ENCOUNTER — PRE-ADMISSION TESTING (OUTPATIENT)
Dept: ADMISSIONS | Facility: MEDICAL CENTER | Age: 33
DRG: 621 | End: 2023-12-13
Attending: SURGERY
Payer: MEDICAID

## 2023-12-13 NOTE — OR NURSING
PAT interview completed. Pt does not read or write. Pt's primary language is Nauruan, however speaks english and denied a . The patient is aware at anytime if needed a  can be arranged.

## 2023-12-15 ENCOUNTER — OFFICE VISIT (OUTPATIENT)
Dept: INTERNAL MEDICINE | Facility: OTHER | Age: 33
End: 2023-12-15
Payer: MEDICAID

## 2023-12-15 ENCOUNTER — PRE-ADMISSION TESTING (OUTPATIENT)
Dept: ADMISSIONS | Facility: MEDICAL CENTER | Age: 33
DRG: 621 | End: 2023-12-15
Attending: SURGERY
Payer: MEDICAID

## 2023-12-15 VITALS
HEIGHT: 56 IN | SYSTOLIC BLOOD PRESSURE: 126 MMHG | OXYGEN SATURATION: 98 % | HEART RATE: 65 BPM | TEMPERATURE: 97 F | BODY MASS INDEX: 55.2 KG/M2 | DIASTOLIC BLOOD PRESSURE: 70 MMHG | WEIGHT: 245.4 LBS | RESPIRATION RATE: 16 BRPM

## 2023-12-15 DIAGNOSIS — G44.89 OTHER HEADACHE SYNDROME: ICD-10-CM

## 2023-12-15 DIAGNOSIS — E66.01 MORBID OBESITY WITH BMI OF 50.0-59.9, ADULT (HCC): ICD-10-CM

## 2023-12-15 DIAGNOSIS — Z01.812 PRE-OPERATIVE LABORATORY EXAMINATION: ICD-10-CM

## 2023-12-15 DIAGNOSIS — R73.03 PREDIABETES: ICD-10-CM

## 2023-12-15 LAB
ALBUMIN SERPL BCP-MCNC: 3.8 G/DL (ref 3.2–4.9)
ALBUMIN/GLOB SERPL: 1.1 G/DL
ALP SERPL-CCNC: 81 U/L (ref 30–99)
ALT SERPL-CCNC: 31 U/L (ref 2–50)
ANION GAP SERPL CALC-SCNC: 10 MMOL/L (ref 7–16)
AST SERPL-CCNC: 18 U/L (ref 12–45)
BASOPHILS # BLD AUTO: 0.4 % (ref 0–1.8)
BASOPHILS # BLD: 0.04 K/UL (ref 0–0.12)
BILIRUB SERPL-MCNC: 0.3 MG/DL (ref 0.1–1.5)
BUN SERPL-MCNC: 10 MG/DL (ref 8–22)
CALCIUM ALBUM COR SERPL-MCNC: 8.9 MG/DL (ref 8.5–10.5)
CALCIUM SERPL-MCNC: 8.7 MG/DL (ref 8.5–10.5)
CHLORIDE SERPL-SCNC: 103 MMOL/L (ref 96–112)
CO2 SERPL-SCNC: 25 MMOL/L (ref 20–33)
CREAT SERPL-MCNC: 0.67 MG/DL (ref 0.5–1.4)
EOSINOPHIL # BLD AUTO: 0.12 K/UL (ref 0–0.51)
EOSINOPHIL NFR BLD: 1.3 % (ref 0–6.9)
ERYTHROCYTE [DISTWIDTH] IN BLOOD BY AUTOMATED COUNT: 46.2 FL (ref 35.9–50)
GFR SERPLBLD CREATININE-BSD FMLA CKD-EPI: 119 ML/MIN/1.73 M 2
GLOBULIN SER CALC-MCNC: 3.4 G/DL (ref 1.9–3.5)
GLUCOSE SERPL-MCNC: 112 MG/DL (ref 65–99)
HCG SERPL QL: NEGATIVE
HCT VFR BLD AUTO: 41.7 % (ref 37–47)
HGB BLD-MCNC: 13.1 G/DL (ref 12–16)
IMM GRANULOCYTES # BLD AUTO: 0.06 K/UL (ref 0–0.11)
IMM GRANULOCYTES NFR BLD AUTO: 0.6 % (ref 0–0.9)
INR PPP: 1.1 (ref 0.87–1.13)
LYMPHOCYTES # BLD AUTO: 2.56 K/UL (ref 1–4.8)
LYMPHOCYTES NFR BLD: 27.2 % (ref 22–41)
MCH RBC QN AUTO: 24.8 PG (ref 27–33)
MCHC RBC AUTO-ENTMCNC: 31.4 G/DL (ref 32.2–35.5)
MCV RBC AUTO: 78.8 FL (ref 81.4–97.8)
MONOCYTES # BLD AUTO: 0.53 K/UL (ref 0–0.85)
MONOCYTES NFR BLD AUTO: 5.6 % (ref 0–13.4)
NEUTROPHILS # BLD AUTO: 6.1 K/UL (ref 1.82–7.42)
NEUTROPHILS NFR BLD: 64.9 % (ref 44–72)
NRBC # BLD AUTO: 0 K/UL
NRBC BLD-RTO: 0 /100 WBC (ref 0–0.2)
PLATELET # BLD AUTO: 361 K/UL (ref 164–446)
PMV BLD AUTO: 10.2 FL (ref 9–12.9)
POTASSIUM SERPL-SCNC: 3.8 MMOL/L (ref 3.6–5.5)
PROT SERPL-MCNC: 7.2 G/DL (ref 6–8.2)
PROTHROMBIN TIME: 14.4 SEC (ref 12–14.6)
RBC # BLD AUTO: 5.29 M/UL (ref 4.2–5.4)
SODIUM SERPL-SCNC: 138 MMOL/L (ref 135–145)
WBC # BLD AUTO: 9.4 K/UL (ref 4.8–10.8)

## 2023-12-15 PROCEDURE — 85610 PROTHROMBIN TIME: CPT

## 2023-12-15 PROCEDURE — 84703 CHORIONIC GONADOTROPIN ASSAY: CPT

## 2023-12-15 PROCEDURE — 3078F DIAST BP <80 MM HG: CPT

## 2023-12-15 PROCEDURE — 99214 OFFICE O/P EST MOD 30 MIN: CPT | Mod: GC

## 2023-12-15 PROCEDURE — 80053 COMPREHEN METABOLIC PANEL: CPT

## 2023-12-15 PROCEDURE — 36415 COLL VENOUS BLD VENIPUNCTURE: CPT

## 2023-12-15 PROCEDURE — 85025 COMPLETE CBC W/AUTO DIFF WBC: CPT

## 2023-12-15 PROCEDURE — 3074F SYST BP LT 130 MM HG: CPT

## 2023-12-15 NOTE — LETTER
December 15, 2023    To Whom It May Concern:         This is confirmation that Christi Mcgarry attended her scheduled appointment with Serena Clemons M.D. on 12/15/23. She was in attendance with her aunt, Marietta Rhodes.          If you have any questions please do not hesitate to call me at the phone number listed below.    Sincerely,          Serena Clemons M.D.  [phone]

## 2023-12-15 NOTE — PROGRESS NOTES
Established Patient    Patient Care Team:  Serena Clemons M.D. as PCP - General (Internal Medicine)  Preferred home care  as Respiratory Therapist (DME Supplier)    Christi Mcgarry is a 33 y.o. female who presents today with the following Chief Complaint(s): Follow up for Diagnoses of Other headache syndrome, Morbid obesity with BMI of 50.0-59.9, adult (HCC), and Prediabetes were pertinent to this visit.    HPI:  Patient is a 32-year-old female with a past medical history of obesity, prediabetes, ADHD, Down syndrome, DELIA on CPAP and GERD who presents to the clinic today with her aunt, Marietta Rhodes for follow-up and acute issue of headache.     Was referred to bariatric surgery for morbid obesity.  Current BMI 55.83.  Was seen in clinic December 13 and has surgery planned on 12/2022, robotic versus laparoscopic sleeve gastrectomy.   Got labs drawn today for preop workup.  Most recent A1c 6.4.  Currently on a strict preop diet, which includes protein shakes, vegetables and lots of liquids.  Instructed not to take any medications, which she has been off of her metformin and omeprazole.  Compliant with her diet and has good support at home.  Has scheduled follow-up on January 3 and 24 with surgery.  Since being on this diet, has been experiencing headaches every day.  Describes the headaches as dull aches that occur on the top and back of her head.  Denies any fevers, chills, lightheadedness, dizziness, blurry vision, eye pain, hearing loss, auras, nausea, vomiting or abdominal pain.     ROS:     General: No fevers, chills, night sweats, weight loss or gain  HEENT: No hearing changes, vision changes, eye pain, ear pain, nasal discharge, sore throat  Neck: No swelling in neck  Pulmonary: No shortness of breath, cough, sputum, or hemoptysis  Cardiovascular: No chest pain, palpitations, or LE swelling  GI: No nausea, vomiting, diarrhea, constipation, abdominal pain, hematochezia or melena  : No dysuria or  "frequency  Neuro: Headaches. No focal weakness, no general weakness, no lightheadedness, no dizziness  Psych: No anxiety or depression    Past Medical History:   Diagnosis Date    ADHD (attention deficit hyperactivity disorder)     Anxiety     Chickenpox     Depression     Pt lost mother October 2023    Headache 05/25/2016    Insomnia 05/25/2016    Learning disabilities     unable read or write    Morbid obesity (HCC) 05/25/2016    Nocturnal enuresis 05/25/2016    Obesity     DELIA (obstructive sleep apnea) 05/25/2016    Prediabetes 05/25/2016    Sleep apnea     UTI (lower urinary tract infection) 05/25/2016     Social History     Tobacco Use    Smoking status: Never    Smokeless tobacco: Never   Vaping Use    Vaping Use: Never used   Substance Use Topics    Alcohol use: No    Drug use: No     Comment: pt denies     No current outpatient medications on file.     No current facility-administered medications for this visit.       Physical Exam:  /70 (BP Location: Left arm, Patient Position: Sitting, BP Cuff Size: Adult)   Pulse 65   Temp 36.1 °C (97 °F) (Temporal)   Resp 16   Ht 1.412 m (4' 7.59\")   Wt 111 kg (245 lb 6.4 oz)   LMP 11/30/2023 (Approximate)   SpO2 98%   BMI 55.83 kg/m²   General: Well developed, well nourished female, in no distress.  HEENT: NC/AT, PERRL, EOMI, no scleral icterus or conjunctival pallor  CV:RRR, no murmurs gallops or Rubs  Pulm: LCAB, no crackles, rales, rhonchi, or wheezing  GI: Normal bowel sounds, abdomen soft, nontender, distended (obese) to deep or light palpation in all 4 quadrants, no HSM.  MSK:  No lower extremity edema  Neuro: Patient is alert and oriented x3, no focal deficits  Psych: Appropriate mood and affect       Assessment and Plan:   1. Other headache syndrome  Likely secondary to post-op restrictive diet  No alarm features  Patient instructed no medications by bariatric surgeon  Discussed other supportive measures such as staying hydrated, applying warm or " cold compress to the area, massaging the area and drinking small sip of caffeine    2. Morbid obesity with BMI of 50.0-59.9, adult (HCC)  BMI 55.83  Weight stable   Continue dietary changes and continue exercise  Followed by bariatric surgery and has planned surgery on 12/22 with follow-up on 1/3 and 1/24    3. Prediabetes  A1c 6.4  Continue dietary changes and continue exercise  Can consider discontinuing metformin after surgery    Return in about 1 month (around 1/15/2024), or Visit early if needed.    Patient Instructions   -For headache, use warm or cold compresses to the area  -Stay hydrated  -Massage the area  -Can consider small amount of caffeine  -Follow-up with bariatric surgery  -It was nice visiting with you today!      Serena Clemons M.D. PGY-3  [facility]    This note was created using voice recognition software.  While every attempt is made to ensure accuracy of transcription, occasionally errors occur.

## 2023-12-15 NOTE — PATIENT INSTRUCTIONS
-For headache, use warm or cold compresses to the area  -Stay hydrated  -Massage the area  -Can consider small amount of caffeine  -Follow-up with bariatric surgery  -It was nice visiting with you today!

## 2023-12-22 ENCOUNTER — ANESTHESIA (OUTPATIENT)
Dept: SURGERY | Facility: MEDICAL CENTER | Age: 33
DRG: 621 | End: 2023-12-22
Payer: MEDICAID

## 2023-12-22 ENCOUNTER — HOSPITAL ENCOUNTER (INPATIENT)
Facility: MEDICAL CENTER | Age: 33
LOS: 2 days | DRG: 621 | End: 2023-12-24
Attending: SURGERY | Admitting: SURGERY
Payer: MEDICAID

## 2023-12-22 ENCOUNTER — ANESTHESIA EVENT (OUTPATIENT)
Dept: SURGERY | Facility: MEDICAL CENTER | Age: 33
DRG: 621 | End: 2023-12-22
Payer: MEDICAID

## 2023-12-22 LAB — HCG SERPL QL: NEGATIVE

## 2023-12-22 PROCEDURE — 88342 IMHCHEM/IMCYTCHM 1ST ANTB: CPT

## 2023-12-22 PROCEDURE — A9270 NON-COVERED ITEM OR SERVICE: HCPCS | Performed by: ANESTHESIOLOGY

## 2023-12-22 PROCEDURE — 700102 HCHG RX REV CODE 250 W/ 637 OVERRIDE(OP): Performed by: ANESTHESIOLOGY

## 2023-12-22 PROCEDURE — 160031 HCHG SURGERY MINUTES - 1ST 30 MINS LEVEL 5: Performed by: SURGERY

## 2023-12-22 PROCEDURE — 36415 COLL VENOUS BLD VENIPUNCTURE: CPT

## 2023-12-22 PROCEDURE — 700111 HCHG RX REV CODE 636 W/ 250 OVERRIDE (IP): Performed by: ANESTHESIOLOGY

## 2023-12-22 PROCEDURE — 502714 HCHG ROBOTIC SURGERY SERVICES: Performed by: SURGERY

## 2023-12-22 PROCEDURE — 700111 HCHG RX REV CODE 636 W/ 250 OVERRIDE (IP): Performed by: SURGERY

## 2023-12-22 PROCEDURE — 700105 HCHG RX REV CODE 258: Performed by: ANESTHESIOLOGY

## 2023-12-22 PROCEDURE — C1729 CATH, DRAINAGE: HCPCS | Performed by: SURGERY

## 2023-12-22 PROCEDURE — 0DB64Z3 EXCISION OF STOMACH, PERCUTANEOUS ENDOSCOPIC APPROACH, VERTICAL: ICD-10-PCS | Performed by: SURGERY

## 2023-12-22 PROCEDURE — 160036 HCHG PACU - EA ADDL 30 MINS PHASE I: Performed by: SURGERY

## 2023-12-22 PROCEDURE — 700105 HCHG RX REV CODE 258: Performed by: SURGERY

## 2023-12-22 PROCEDURE — 84703 CHORIONIC GONADOTROPIN ASSAY: CPT

## 2023-12-22 PROCEDURE — 160042 HCHG SURGERY MINUTES - EA ADDL 1 MIN LEVEL 5: Performed by: SURGERY

## 2023-12-22 PROCEDURE — 0FB04ZX EXCISION OF LIVER, PERCUTANEOUS ENDOSCOPIC APPROACH, DIAGNOSTIC: ICD-10-PCS | Performed by: SURGERY

## 2023-12-22 PROCEDURE — 700111 HCHG RX REV CODE 636 W/ 250 OVERRIDE (IP): Mod: JZ | Performed by: ANESTHESIOLOGY

## 2023-12-22 PROCEDURE — 88313 SPECIAL STAINS GROUP 2: CPT | Mod: 91

## 2023-12-22 PROCEDURE — 88307 TISSUE EXAM BY PATHOLOGIST: CPT

## 2023-12-22 PROCEDURE — 770001 HCHG ROOM/CARE - MED/SURG/GYN PRIV*

## 2023-12-22 PROCEDURE — A9270 NON-COVERED ITEM OR SERVICE: HCPCS | Performed by: SURGERY

## 2023-12-22 PROCEDURE — 700102 HCHG RX REV CODE 250 W/ 637 OVERRIDE(OP): Performed by: SURGERY

## 2023-12-22 PROCEDURE — 160035 HCHG PACU - 1ST 60 MINS PHASE I: Performed by: SURGERY

## 2023-12-22 PROCEDURE — 160009 HCHG ANES TIME/MIN: Performed by: SURGERY

## 2023-12-22 PROCEDURE — 160048 HCHG OR STATISTICAL LEVEL 1-5: Performed by: SURGERY

## 2023-12-22 PROCEDURE — 700101 HCHG RX REV CODE 250: Performed by: ANESTHESIOLOGY

## 2023-12-22 PROCEDURE — 700101 HCHG RX REV CODE 250: Performed by: SURGERY

## 2023-12-22 PROCEDURE — 160002 HCHG RECOVERY MINUTES (STAT): Performed by: SURGERY

## 2023-12-22 RX ORDER — HYDROMORPHONE HYDROCHLORIDE 1 MG/ML
0.2 INJECTION, SOLUTION INTRAMUSCULAR; INTRAVENOUS; SUBCUTANEOUS
Status: DISCONTINUED | OUTPATIENT
Start: 2023-12-22 | End: 2023-12-22 | Stop reason: HOSPADM

## 2023-12-22 RX ORDER — HYDROMORPHONE HYDROCHLORIDE 1 MG/ML
0.1 INJECTION, SOLUTION INTRAMUSCULAR; INTRAVENOUS; SUBCUTANEOUS
Status: DISCONTINUED | OUTPATIENT
Start: 2023-12-22 | End: 2023-12-22 | Stop reason: HOSPADM

## 2023-12-22 RX ORDER — OXYCODONE HCL 5 MG/5 ML
5 SOLUTION, ORAL ORAL
Status: COMPLETED | OUTPATIENT
Start: 2023-12-22 | End: 2023-12-22

## 2023-12-22 RX ORDER — KETOROLAC TROMETHAMINE 30 MG/ML
15 INJECTION, SOLUTION INTRAMUSCULAR; INTRAVENOUS EVERY 6 HOURS
Status: DISCONTINUED | OUTPATIENT
Start: 2023-12-22 | End: 2023-12-24 | Stop reason: HOSPADM

## 2023-12-22 RX ORDER — DEXAMETHASONE SODIUM PHOSPHATE 4 MG/ML
4 INJECTION, SOLUTION INTRA-ARTICULAR; INTRALESIONAL; INTRAMUSCULAR; INTRAVENOUS; SOFT TISSUE EVERY 6 HOURS
Status: DISCONTINUED | OUTPATIENT
Start: 2023-12-22 | End: 2023-12-24 | Stop reason: HOSPADM

## 2023-12-22 RX ORDER — SODIUM CHLORIDE, SODIUM LACTATE, POTASSIUM CHLORIDE, CALCIUM CHLORIDE 600; 310; 30; 20 MG/100ML; MG/100ML; MG/100ML; MG/100ML
INJECTION, SOLUTION INTRAVENOUS CONTINUOUS
Status: ACTIVE | OUTPATIENT
Start: 2023-12-22 | End: 2023-12-22

## 2023-12-22 RX ORDER — HYDROMORPHONE HYDROCHLORIDE 1 MG/ML
0.4 INJECTION, SOLUTION INTRAMUSCULAR; INTRAVENOUS; SUBCUTANEOUS
Status: DISCONTINUED | OUTPATIENT
Start: 2023-12-22 | End: 2023-12-22 | Stop reason: HOSPADM

## 2023-12-22 RX ORDER — ROCURONIUM BROMIDE 10 MG/ML
INJECTION, SOLUTION INTRAVENOUS PRN
Status: DISCONTINUED | OUTPATIENT
Start: 2023-12-22 | End: 2023-12-22 | Stop reason: SURG

## 2023-12-22 RX ORDER — HYDROMORPHONE HYDROCHLORIDE 2 MG/ML
INJECTION, SOLUTION INTRAMUSCULAR; INTRAVENOUS; SUBCUTANEOUS PRN
Status: DISCONTINUED | OUTPATIENT
Start: 2023-12-22 | End: 2023-12-22 | Stop reason: SURG

## 2023-12-22 RX ORDER — METOCLOPRAMIDE HYDROCHLORIDE 5 MG/ML
10 INJECTION INTRAMUSCULAR; INTRAVENOUS EVERY 6 HOURS PRN
Status: DISCONTINUED | OUTPATIENT
Start: 2023-12-22 | End: 2023-12-24 | Stop reason: HOSPADM

## 2023-12-22 RX ORDER — BUPIVACAINE HYDROCHLORIDE AND EPINEPHRINE 5; 5 MG/ML; UG/ML
INJECTION, SOLUTION EPIDURAL; INTRACAUDAL; PERINEURAL
Status: DISCONTINUED | OUTPATIENT
Start: 2023-12-22 | End: 2023-12-22 | Stop reason: HOSPADM

## 2023-12-22 RX ORDER — DIPHENHYDRAMINE HYDROCHLORIDE 50 MG/ML
12.5 INJECTION INTRAMUSCULAR; INTRAVENOUS
Status: DISCONTINUED | OUTPATIENT
Start: 2023-12-22 | End: 2023-12-22 | Stop reason: HOSPADM

## 2023-12-22 RX ORDER — GABAPENTIN 300 MG/1
300 CAPSULE ORAL ONCE
Status: COMPLETED | OUTPATIENT
Start: 2023-12-22 | End: 2023-12-22

## 2023-12-22 RX ORDER — DEXMEDETOMIDINE HYDROCHLORIDE 100 UG/ML
INJECTION, SOLUTION INTRAVENOUS PRN
Status: DISCONTINUED | OUTPATIENT
Start: 2023-12-22 | End: 2023-12-22 | Stop reason: SURG

## 2023-12-22 RX ORDER — OXYCODONE HCL 5 MG/5 ML
10 SOLUTION, ORAL ORAL
Status: COMPLETED | OUTPATIENT
Start: 2023-12-22 | End: 2023-12-22

## 2023-12-22 RX ORDER — ONDANSETRON 2 MG/ML
4 INJECTION INTRAMUSCULAR; INTRAVENOUS
Status: COMPLETED | OUTPATIENT
Start: 2023-12-22 | End: 2023-12-22

## 2023-12-22 RX ORDER — DEXAMETHASONE SODIUM PHOSPHATE 4 MG/ML
INJECTION, SOLUTION INTRA-ARTICULAR; INTRALESIONAL; INTRAMUSCULAR; INTRAVENOUS; SOFT TISSUE PRN
Status: DISCONTINUED | OUTPATIENT
Start: 2023-12-22 | End: 2023-12-22 | Stop reason: SURG

## 2023-12-22 RX ORDER — HALOPERIDOL 5 MG/ML
1 INJECTION INTRAMUSCULAR
Status: DISCONTINUED | OUTPATIENT
Start: 2023-12-22 | End: 2023-12-22 | Stop reason: HOSPADM

## 2023-12-22 RX ORDER — METHOCARBAMOL 500 MG/1
1000 TABLET, FILM COATED ORAL EVERY 8 HOURS
Status: DISCONTINUED | OUTPATIENT
Start: 2023-12-23 | End: 2023-12-24 | Stop reason: HOSPADM

## 2023-12-22 RX ORDER — HEPARIN SODIUM 5000 [USP'U]/ML
7500 INJECTION, SOLUTION INTRAVENOUS; SUBCUTANEOUS EVERY 8 HOURS
Status: DISCONTINUED | OUTPATIENT
Start: 2023-12-23 | End: 2023-12-24 | Stop reason: HOSPADM

## 2023-12-22 RX ORDER — PANTOPRAZOLE SODIUM 40 MG/10ML
40 INJECTION, POWDER, LYOPHILIZED, FOR SOLUTION INTRAVENOUS 2 TIMES DAILY
Status: DISCONTINUED | OUTPATIENT
Start: 2023-12-22 | End: 2023-12-24 | Stop reason: HOSPADM

## 2023-12-22 RX ORDER — EPHEDRINE SULFATE 50 MG/ML
5 INJECTION, SOLUTION INTRAVENOUS
Status: DISCONTINUED | OUTPATIENT
Start: 2023-12-22 | End: 2023-12-22 | Stop reason: HOSPADM

## 2023-12-22 RX ORDER — LIDOCAINE HYDROCHLORIDE 20 MG/ML
INJECTION, SOLUTION EPIDURAL; INFILTRATION; INTRACAUDAL; PERINEURAL PRN
Status: DISCONTINUED | OUTPATIENT
Start: 2023-12-22 | End: 2023-12-22 | Stop reason: SURG

## 2023-12-22 RX ORDER — ACETAMINOPHEN 500 MG
1000 TABLET ORAL EVERY 6 HOURS PRN
Status: DISCONTINUED | OUTPATIENT
Start: 2023-12-27 | End: 2023-12-24 | Stop reason: HOSPADM

## 2023-12-22 RX ORDER — SIMETHICONE 125 MG
125 TABLET,CHEWABLE ORAL 2 TIMES DAILY
Status: DISCONTINUED | OUTPATIENT
Start: 2023-12-22 | End: 2023-12-24 | Stop reason: HOSPADM

## 2023-12-22 RX ORDER — ONDANSETRON 2 MG/ML
INJECTION INTRAMUSCULAR; INTRAVENOUS PRN
Status: DISCONTINUED | OUTPATIENT
Start: 2023-12-22 | End: 2023-12-22 | Stop reason: SURG

## 2023-12-22 RX ORDER — ACETAMINOPHEN 500 MG
1000 TABLET ORAL ONCE
Status: COMPLETED | OUTPATIENT
Start: 2023-12-22 | End: 2023-12-22

## 2023-12-22 RX ORDER — SODIUM CHLORIDE, SODIUM LACTATE, POTASSIUM CHLORIDE, CALCIUM CHLORIDE 600; 310; 30; 20 MG/100ML; MG/100ML; MG/100ML; MG/100ML
INJECTION, SOLUTION INTRAVENOUS CONTINUOUS
Status: DISCONTINUED | OUTPATIENT
Start: 2023-12-22 | End: 2023-12-24 | Stop reason: HOSPADM

## 2023-12-22 RX ORDER — METHOCARBAMOL 500 MG/1
1000 TABLET, FILM COATED ORAL EVERY 8 HOURS
Status: DISCONTINUED | OUTPATIENT
Start: 2023-12-22 | End: 2023-12-22

## 2023-12-22 RX ORDER — OXYCODONE HCL 5 MG/5 ML
10 SOLUTION, ORAL ORAL
Status: DISCONTINUED | OUTPATIENT
Start: 2023-12-22 | End: 2023-12-24 | Stop reason: HOSPADM

## 2023-12-22 RX ORDER — ONDANSETRON 2 MG/ML
4 INJECTION INTRAMUSCULAR; INTRAVENOUS EVERY 6 HOURS
Status: DISCONTINUED | OUTPATIENT
Start: 2023-12-22 | End: 2023-12-24 | Stop reason: HOSPADM

## 2023-12-22 RX ORDER — SODIUM CHLORIDE, SODIUM LACTATE, POTASSIUM CHLORIDE, CALCIUM CHLORIDE 600; 310; 30; 20 MG/100ML; MG/100ML; MG/100ML; MG/100ML
INJECTION, SOLUTION INTRAVENOUS
Status: DISCONTINUED | OUTPATIENT
Start: 2023-12-22 | End: 2023-12-22 | Stop reason: SURG

## 2023-12-22 RX ORDER — SCOLOPAMINE TRANSDERMAL SYSTEM 1 MG/1
1 PATCH, EXTENDED RELEASE TRANSDERMAL
Status: DISCONTINUED | OUTPATIENT
Start: 2023-12-22 | End: 2023-12-22 | Stop reason: HOSPADM

## 2023-12-22 RX ORDER — PROMETHAZINE HYDROCHLORIDE 25 MG/1
25 SUPPOSITORY RECTAL EVERY 4 HOURS PRN
Status: DISCONTINUED | OUTPATIENT
Start: 2023-12-22 | End: 2023-12-24 | Stop reason: HOSPADM

## 2023-12-22 RX ORDER — HYDROMORPHONE HYDROCHLORIDE 1 MG/ML
0.5 INJECTION, SOLUTION INTRAMUSCULAR; INTRAVENOUS; SUBCUTANEOUS
Status: DISCONTINUED | OUTPATIENT
Start: 2023-12-22 | End: 2023-12-24 | Stop reason: HOSPADM

## 2023-12-22 RX ORDER — ACETAMINOPHEN 500 MG
1000 TABLET ORAL EVERY 6 HOURS
Status: DISCONTINUED | OUTPATIENT
Start: 2023-12-22 | End: 2023-12-24 | Stop reason: HOSPADM

## 2023-12-22 RX ORDER — LIDOCAINE HYDROCHLORIDE 40 MG/ML
SOLUTION TOPICAL PRN
Status: DISCONTINUED | OUTPATIENT
Start: 2023-12-22 | End: 2023-12-22 | Stop reason: SURG

## 2023-12-22 RX ORDER — OXYCODONE HCL 5 MG/5 ML
5 SOLUTION, ORAL ORAL
Status: DISCONTINUED | OUTPATIENT
Start: 2023-12-22 | End: 2023-12-24 | Stop reason: HOSPADM

## 2023-12-22 RX ORDER — HEPARIN SODIUM 5000 [USP'U]/ML
5000 INJECTION, SOLUTION INTRAVENOUS; SUBCUTANEOUS
Status: COMPLETED | OUTPATIENT
Start: 2023-12-22 | End: 2023-12-22

## 2023-12-22 RX ORDER — MIDAZOLAM HYDROCHLORIDE 1 MG/ML
INJECTION INTRAMUSCULAR; INTRAVENOUS PRN
Status: DISCONTINUED | OUTPATIENT
Start: 2023-12-22 | End: 2023-12-22 | Stop reason: SURG

## 2023-12-22 RX ORDER — SODIUM CHLORIDE, SODIUM LACTATE, POTASSIUM CHLORIDE, CALCIUM CHLORIDE 600; 310; 30; 20 MG/100ML; MG/100ML; MG/100ML; MG/100ML
INJECTION, SOLUTION INTRAVENOUS CONTINUOUS
Status: DISCONTINUED | OUTPATIENT
Start: 2023-12-22 | End: 2023-12-22 | Stop reason: HOSPADM

## 2023-12-22 RX ORDER — CEFAZOLIN SODIUM 1 G/3ML
INJECTION, POWDER, FOR SOLUTION INTRAMUSCULAR; INTRAVENOUS PRN
Status: DISCONTINUED | OUTPATIENT
Start: 2023-12-22 | End: 2023-12-22 | Stop reason: SURG

## 2023-12-22 RX ORDER — GABAPENTIN 300 MG/1
300 CAPSULE ORAL 3 TIMES DAILY
Status: DISCONTINUED | OUTPATIENT
Start: 2023-12-22 | End: 2023-12-24 | Stop reason: HOSPADM

## 2023-12-22 RX ORDER — HYDRALAZINE HYDROCHLORIDE 20 MG/ML
5 INJECTION INTRAMUSCULAR; INTRAVENOUS
Status: DISCONTINUED | OUTPATIENT
Start: 2023-12-22 | End: 2023-12-22 | Stop reason: HOSPADM

## 2023-12-22 RX ORDER — OXYCODONE HCL 10 MG/1
10 TABLET, FILM COATED, EXTENDED RELEASE ORAL ONCE
Status: COMPLETED | OUTPATIENT
Start: 2023-12-22 | End: 2023-12-22

## 2023-12-22 RX ADMIN — DEXAMETHASONE SODIUM PHOSPHATE 4 MG: 4 INJECTION INTRA-ARTICULAR; INTRALESIONAL; INTRAMUSCULAR; INTRAVENOUS; SOFT TISSUE at 16:42

## 2023-12-22 RX ADMIN — DEXAMETHASONE SODIUM PHOSPHATE 4 MG: 4 INJECTION INTRA-ARTICULAR; INTRALESIONAL; INTRAMUSCULAR; INTRAVENOUS; SOFT TISSUE at 23:31

## 2023-12-22 RX ADMIN — LIDOCAINE HYDROCHLORIDE 100 MG: 20 INJECTION, SOLUTION EPIDURAL; INFILTRATION; INTRACAUDAL at 16:40

## 2023-12-22 RX ADMIN — DEXMEDETOMIDINE 20 MCG: 100 INJECTION, SOLUTION INTRAVENOUS at 17:28

## 2023-12-22 RX ADMIN — PROPOFOL 100 MG: 10 INJECTION, EMULSION INTRAVENOUS at 17:12

## 2023-12-22 RX ADMIN — SUGAMMADEX 200 MG: 100 INJECTION, SOLUTION INTRAVENOUS at 17:37

## 2023-12-22 RX ADMIN — ONDANSETRON 4 MG: 2 INJECTION INTRAMUSCULAR; INTRAVENOUS at 18:32

## 2023-12-22 RX ADMIN — DEXMEDETOMIDINE 20 MCG: 100 INJECTION, SOLUTION INTRAVENOUS at 17:11

## 2023-12-22 RX ADMIN — LIDOCAINE HYDROCHLORIDE 4 ML: 40 SOLUTION TOPICAL at 16:40

## 2023-12-22 RX ADMIN — OXYCODONE HYDROCHLORIDE 5 MG: 5 SOLUTION ORAL at 23:30

## 2023-12-22 RX ADMIN — SCOPOLAMINE 1 PATCH: 1.5 PATCH, EXTENDED RELEASE TRANSDERMAL at 13:58

## 2023-12-22 RX ADMIN — CEFAZOLIN 3 G: 1 INJECTION, POWDER, FOR SOLUTION INTRAMUSCULAR; INTRAVENOUS at 16:42

## 2023-12-22 RX ADMIN — METHOCARBAMOL 1000 MG: 100 INJECTION, SOLUTION INTRAMUSCULAR; INTRAVENOUS at 18:28

## 2023-12-22 RX ADMIN — FENTANYL CITRATE 100 MCG: 50 INJECTION, SOLUTION INTRAMUSCULAR; INTRAVENOUS at 16:40

## 2023-12-22 RX ADMIN — PROPOFOL 200 MG: 10 INJECTION, EMULSION INTRAVENOUS at 16:40

## 2023-12-22 RX ADMIN — ROCURONIUM BROMIDE 50 MG: 50 INJECTION, SOLUTION INTRAVENOUS at 16:40

## 2023-12-22 RX ADMIN — ONDANSETRON 4 MG: 2 INJECTION INTRAMUSCULAR; INTRAVENOUS at 20:26

## 2023-12-22 RX ADMIN — OXYCODONE HYDROCHLORIDE 10 MG: 10 TABLET, FILM COATED, EXTENDED RELEASE ORAL at 15:26

## 2023-12-22 RX ADMIN — GABAPENTIN 300 MG: 300 CAPSULE ORAL at 15:26

## 2023-12-22 RX ADMIN — KETOROLAC TROMETHAMINE 15 MG: 30 INJECTION, SOLUTION INTRAMUSCULAR; INTRAVENOUS at 20:26

## 2023-12-22 RX ADMIN — SODIUM CHLORIDE, POTASSIUM CHLORIDE, SODIUM LACTATE AND CALCIUM CHLORIDE: 600; 310; 30; 20 INJECTION, SOLUTION INTRAVENOUS at 16:33

## 2023-12-22 RX ADMIN — ONDANSETRON 4 MG: 2 INJECTION INTRAMUSCULAR; INTRAVENOUS at 17:37

## 2023-12-22 RX ADMIN — HYDROMORPHONE HYDROCHLORIDE 1 MG: 2 INJECTION INTRAMUSCULAR; INTRAVENOUS; SUBCUTANEOUS at 16:59

## 2023-12-22 RX ADMIN — ROCURONIUM BROMIDE 50 MG: 50 INJECTION, SOLUTION INTRAVENOUS at 17:15

## 2023-12-22 RX ADMIN — SODIUM CHLORIDE, POTASSIUM CHLORIDE, SODIUM LACTATE AND CALCIUM CHLORIDE: 600; 310; 30; 20 INJECTION, SOLUTION INTRAVENOUS at 15:27

## 2023-12-22 RX ADMIN — HYDROMORPHONE HYDROCHLORIDE 1 MG: 2 INJECTION INTRAMUSCULAR; INTRAVENOUS; SUBCUTANEOUS at 16:47

## 2023-12-22 RX ADMIN — OXYCODONE HYDROCHLORIDE 10 MG: 5 SOLUTION ORAL at 18:59

## 2023-12-22 RX ADMIN — SODIUM CHLORIDE, POTASSIUM CHLORIDE, SODIUM LACTATE AND CALCIUM CHLORIDE: 600; 310; 30; 20 INJECTION, SOLUTION INTRAVENOUS at 20:35

## 2023-12-22 RX ADMIN — ACETAMINOPHEN 1000 MG: 500 TABLET ORAL at 20:25

## 2023-12-22 RX ADMIN — MIDAZOLAM HYDROCHLORIDE 2 MG: 1 INJECTION, SOLUTION INTRAMUSCULAR; INTRAVENOUS at 16:34

## 2023-12-22 RX ADMIN — SIMETHICONE 125 MG: 125 TABLET, CHEWABLE ORAL at 20:26

## 2023-12-22 RX ADMIN — HEPARIN SODIUM 5000 UNITS: 5000 INJECTION, SOLUTION INTRAVENOUS; SUBCUTANEOUS at 15:27

## 2023-12-22 RX ADMIN — ACETAMINOPHEN 1000 MG: 500 TABLET, FILM COATED ORAL at 15:26

## 2023-12-22 RX ADMIN — DEXMEDETOMIDINE 20 MCG: 100 INJECTION, SOLUTION INTRAVENOUS at 17:37

## 2023-12-22 ASSESSMENT — COGNITIVE AND FUNCTIONAL STATUS - GENERAL
MOBILITY SCORE: 22
DAILY ACTIVITIY SCORE: 24
SUGGESTED CMS G CODE MODIFIER MOBILITY: CJ
MOVING TO AND FROM BED TO CHAIR: A LITTLE
SUGGESTED CMS G CODE MODIFIER DAILY ACTIVITY: CH
MOVING FROM LYING ON BACK TO SITTING ON SIDE OF FLAT BED: A LITTLE

## 2023-12-22 ASSESSMENT — PAIN DESCRIPTION - PAIN TYPE
TYPE: ACUTE PAIN
TYPE: ACUTE PAIN;SURGICAL PAIN
TYPE: ACUTE PAIN;SURGICAL PAIN
TYPE: ACUTE PAIN
TYPE: ACUTE PAIN;SURGICAL PAIN

## 2023-12-22 ASSESSMENT — LIFESTYLE VARIABLES
ALCOHOL_USE: NO
HOW MANY TIMES IN THE PAST YEAR HAVE YOU HAD 5 OR MORE DRINKS IN A DAY: 0
ON A TYPICAL DAY WHEN YOU DRINK ALCOHOL HOW MANY DRINKS DO YOU HAVE: 0
TOTAL SCORE: 0
EVER FELT BAD OR GUILTY ABOUT YOUR DRINKING: NO
CONSUMPTION TOTAL: NEGATIVE
AVERAGE NUMBER OF DAYS PER WEEK YOU HAVE A DRINK CONTAINING ALCOHOL: 0
EVER HAD A DRINK FIRST THING IN THE MORNING TO STEADY YOUR NERVES TO GET RID OF A HANGOVER: NO
HAVE YOU EVER FELT YOU SHOULD CUT DOWN ON YOUR DRINKING: NO
HAVE PEOPLE ANNOYED YOU BY CRITICIZING YOUR DRINKING: NO
DOES PATIENT WANT TO STOP DRINKING: NO

## 2023-12-22 ASSESSMENT — FIBROSIS 4 INDEX: FIB4 SCORE: 0.29

## 2023-12-22 NOTE — PROGRESS NOTES
[facility]    Allergies reviewed.     Patient denies any outpatient antibiotics in the last 30 days.     Patient is not taking anticoagulants.    Preferred pharmacy for this visit - [facility] ([phone])

## 2023-12-22 NOTE — ANESTHESIA PREPROCEDURE EVALUATION
Case: [id number] Date/Time: 12/22/23 1515    Procedure: ROBOTIC SLEEVE GASTRECTOMY    Pre-op diagnosis: MORBID OBESITY    Location: [place] [address] / SURGERY [facility]    Surgeons: Bill Collins M.D.            Relevant Problems   ANESTHESIA   (positive) Obstructive sleep apnea syndrome      GI   (positive) Gastroesophageal reflux disease without esophagitis       Physical Exam    Airway   Mallampati: II  TM distance: >3 FB  Neck ROM: full       Cardiovascular   Rhythm: regular  Rate: normal     Dental - normal exam           Pulmonary   (+) decreased breath sounds     Abdominal   (+) obese     Neurological - normal exam                   Anesthesia Plan    ASA 3 (trisomy 21)   ASA physical status 3 criteria: morbid obesity - BMI greater than or equal to 40 and other (comment)    Plan - general       Airway plan will be ETT          Induction: intravenous    Postoperative Plan: Postoperative administration of opioids is intended.    Pertinent diagnostic labs and testing reviewed    Informed Consent:    Anesthetic plan and risks discussed with patient and sibling.    Use of blood products discussed with: patient and sibling whom consented to blood products.

## 2023-12-23 LAB
ANION GAP SERPL CALC-SCNC: 14 MMOL/L (ref 7–16)
BUN SERPL-MCNC: 13 MG/DL (ref 8–22)
CALCIUM SERPL-MCNC: 8.4 MG/DL (ref 8.5–10.5)
CHLORIDE SERPL-SCNC: 105 MMOL/L (ref 96–112)
CO2 SERPL-SCNC: 17 MMOL/L (ref 20–33)
CREAT SERPL-MCNC: 0.52 MG/DL (ref 0.5–1.4)
ERYTHROCYTE [DISTWIDTH] IN BLOOD BY AUTOMATED COUNT: 48.1 FL (ref 35.9–50)
GFR SERPLBLD CREATININE-BSD FMLA CKD-EPI: 126 ML/MIN/1.73 M 2
GLUCOSE SERPL-MCNC: 142 MG/DL (ref 65–99)
HCT VFR BLD AUTO: 42.8 % (ref 37–47)
HGB BLD-MCNC: 13.1 G/DL (ref 12–16)
MCH RBC QN AUTO: 24.9 PG (ref 27–33)
MCHC RBC AUTO-ENTMCNC: 30.6 G/DL (ref 32.2–35.5)
MCV RBC AUTO: 81.2 FL (ref 81.4–97.8)
PLATELET # BLD AUTO: 235 K/UL (ref 164–446)
PMV BLD AUTO: 11.2 FL (ref 9–12.9)
POTASSIUM SERPL-SCNC: 4.5 MMOL/L (ref 3.6–5.5)
RBC # BLD AUTO: 5.27 M/UL (ref 4.2–5.4)
SODIUM SERPL-SCNC: 136 MMOL/L (ref 135–145)
WBC # BLD AUTO: 12.7 K/UL (ref 4.8–10.8)

## 2023-12-23 PROCEDURE — 85027 COMPLETE CBC AUTOMATED: CPT

## 2023-12-23 PROCEDURE — 700105 HCHG RX REV CODE 258: Performed by: SURGERY

## 2023-12-23 PROCEDURE — A9270 NON-COVERED ITEM OR SERVICE: HCPCS | Performed by: STUDENT IN AN ORGANIZED HEALTH CARE EDUCATION/TRAINING PROGRAM

## 2023-12-23 PROCEDURE — 700111 HCHG RX REV CODE 636 W/ 250 OVERRIDE (IP): Performed by: SURGERY

## 2023-12-23 PROCEDURE — 36415 COLL VENOUS BLD VENIPUNCTURE: CPT

## 2023-12-23 PROCEDURE — A9270 NON-COVERED ITEM OR SERVICE: HCPCS | Performed by: SURGERY

## 2023-12-23 PROCEDURE — C9113 INJ PANTOPRAZOLE SODIUM, VIA: HCPCS | Performed by: SURGERY

## 2023-12-23 PROCEDURE — 700102 HCHG RX REV CODE 250 W/ 637 OVERRIDE(OP): Performed by: STUDENT IN AN ORGANIZED HEALTH CARE EDUCATION/TRAINING PROGRAM

## 2023-12-23 PROCEDURE — 700102 HCHG RX REV CODE 250 W/ 637 OVERRIDE(OP): Performed by: SURGERY

## 2023-12-23 PROCEDURE — 80048 BASIC METABOLIC PNL TOTAL CA: CPT

## 2023-12-23 PROCEDURE — 770001 HCHG ROOM/CARE - MED/SURG/GYN PRIV*

## 2023-12-23 RX ORDER — POLYETHYLENE GLYCOL 3350 17 G/17G
1 POWDER, FOR SOLUTION ORAL DAILY
Status: DISCONTINUED | OUTPATIENT
Start: 2023-12-23 | End: 2023-12-24 | Stop reason: HOSPADM

## 2023-12-23 RX ADMIN — METHOCARBAMOL 1000 MG: 500 TABLET ORAL at 02:17

## 2023-12-23 RX ADMIN — HEPARIN SODIUM 7500 UNITS: 5000 INJECTION, SOLUTION INTRAVENOUS; SUBCUTANEOUS at 15:10

## 2023-12-23 RX ADMIN — ACETAMINOPHEN 1000 MG: 500 TABLET ORAL at 23:20

## 2023-12-23 RX ADMIN — ACETAMINOPHEN 1000 MG: 500 TABLET ORAL at 16:53

## 2023-12-23 RX ADMIN — GABAPENTIN 300 MG: 300 CAPSULE ORAL at 05:15

## 2023-12-23 RX ADMIN — METHOCARBAMOL 1000 MG: 500 TABLET ORAL at 23:20

## 2023-12-23 RX ADMIN — ONDANSETRON 4 MG: 2 INJECTION INTRAMUSCULAR; INTRAVENOUS at 16:56

## 2023-12-23 RX ADMIN — KETOROLAC TROMETHAMINE 15 MG: 30 INJECTION, SOLUTION INTRAMUSCULAR; INTRAVENOUS at 23:20

## 2023-12-23 RX ADMIN — PANTOPRAZOLE SODIUM 40 MG: 40 INJECTION, POWDER, FOR SOLUTION INTRAVENOUS at 17:03

## 2023-12-23 RX ADMIN — ONDANSETRON 4 MG: 2 INJECTION INTRAMUSCULAR; INTRAVENOUS at 11:47

## 2023-12-23 RX ADMIN — SODIUM CHLORIDE, POTASSIUM CHLORIDE, SODIUM LACTATE AND CALCIUM CHLORIDE: 600; 310; 30; 20 INJECTION, SOLUTION INTRAVENOUS at 17:02

## 2023-12-23 RX ADMIN — GABAPENTIN 300 MG: 300 CAPSULE ORAL at 11:49

## 2023-12-23 RX ADMIN — KETOROLAC TROMETHAMINE 15 MG: 30 INJECTION, SOLUTION INTRAMUSCULAR; INTRAVENOUS at 11:47

## 2023-12-23 RX ADMIN — DEXAMETHASONE SODIUM PHOSPHATE 4 MG: 4 INJECTION INTRA-ARTICULAR; INTRALESIONAL; INTRAMUSCULAR; INTRAVENOUS; SOFT TISSUE at 11:49

## 2023-12-23 RX ADMIN — ACETAMINOPHEN 1000 MG: 500 TABLET ORAL at 05:14

## 2023-12-23 RX ADMIN — POLYETHYLENE GLYCOL 3350 1 PACKET: 17 POWDER, FOR SOLUTION ORAL at 09:23

## 2023-12-23 RX ADMIN — SIMETHICONE 125 MG: 125 TABLET, CHEWABLE ORAL at 16:55

## 2023-12-23 RX ADMIN — OXYCODONE HYDROCHLORIDE 10 MG: 5 SOLUTION ORAL at 02:32

## 2023-12-23 RX ADMIN — ONDANSETRON 4 MG: 2 INJECTION INTRAMUSCULAR; INTRAVENOUS at 05:15

## 2023-12-23 RX ADMIN — SIMETHICONE 125 MG: 125 TABLET, CHEWABLE ORAL at 05:15

## 2023-12-23 RX ADMIN — GABAPENTIN 300 MG: 300 CAPSULE ORAL at 16:55

## 2023-12-23 RX ADMIN — DEXAMETHASONE SODIUM PHOSPHATE 4 MG: 4 INJECTION INTRA-ARTICULAR; INTRALESIONAL; INTRAMUSCULAR; INTRAVENOUS; SOFT TISSUE at 16:56

## 2023-12-23 RX ADMIN — METHOCARBAMOL 1000 MG: 500 TABLET ORAL at 16:53

## 2023-12-23 RX ADMIN — SODIUM CHLORIDE, POTASSIUM CHLORIDE, SODIUM LACTATE AND CALCIUM CHLORIDE: 600; 310; 30; 20 INJECTION, SOLUTION INTRAVENOUS at 05:18

## 2023-12-23 RX ADMIN — ONDANSETRON 4 MG: 2 INJECTION INTRAMUSCULAR; INTRAVENOUS at 23:20

## 2023-12-23 RX ADMIN — ACETAMINOPHEN 1000 MG: 500 TABLET ORAL at 11:48

## 2023-12-23 RX ADMIN — HEPARIN SODIUM 7500 UNITS: 5000 INJECTION, SOLUTION INTRAVENOUS; SUBCUTANEOUS at 23:20

## 2023-12-23 RX ADMIN — PANTOPRAZOLE SODIUM 40 MG: 40 INJECTION, POWDER, FOR SOLUTION INTRAVENOUS at 05:15

## 2023-12-23 RX ADMIN — KETOROLAC TROMETHAMINE 15 MG: 30 INJECTION, SOLUTION INTRAMUSCULAR; INTRAVENOUS at 05:14

## 2023-12-23 RX ADMIN — DEXAMETHASONE SODIUM PHOSPHATE 4 MG: 4 INJECTION INTRA-ARTICULAR; INTRALESIONAL; INTRAMUSCULAR; INTRAVENOUS; SOFT TISSUE at 05:15

## 2023-12-23 RX ADMIN — HEPARIN SODIUM 7500 UNITS: 5000 INJECTION, SOLUTION INTRAVENOUS; SUBCUTANEOUS at 05:14

## 2023-12-23 RX ADMIN — DEXAMETHASONE SODIUM PHOSPHATE 4 MG: 4 INJECTION INTRA-ARTICULAR; INTRALESIONAL; INTRAMUSCULAR; INTRAVENOUS; SOFT TISSUE at 23:21

## 2023-12-23 RX ADMIN — KETOROLAC TROMETHAMINE 15 MG: 30 INJECTION, SOLUTION INTRAMUSCULAR; INTRAVENOUS at 16:57

## 2023-12-23 ASSESSMENT — PATIENT HEALTH QUESTIONNAIRE - PHQ9
1. LITTLE INTEREST OR PLEASURE IN DOING THINGS: NOT AT ALL
SUM OF ALL RESPONSES TO PHQ9 QUESTIONS 1 AND 2: 0
2. FEELING DOWN, DEPRESSED, IRRITABLE, OR HOPELESS: NOT AT ALL

## 2023-12-23 NOTE — OR NURSING
1849: pt's brother Bill phoned and updated on pt status in Recovery and POC.   1852: family at bedside in PACU at pt request.

## 2023-12-23 NOTE — DISCHARGE INSTRUCTIONS
[facility]  Post Weight-Loss Surgery Discharge Instructions      1. DIET: Follow the diet progression detailed in your post-op booklet. Progressing as instructed will make for a smooth transition after surgery and prevent any discomfort, associated with eating foods before your stomach is ready, or eating too much. Drinking water for hydration and protein shakes are much more important than food intake in the first week or two after surgery. Drink enough water to keep your urine pale yellow. Increase water intake if your urine is a darker yellow or if have burning with urination. Occasional nausea and vomiting in the immediate post-operative period are normal. Please take your anti-nausea medication (e.g. “Zofran” or ondansetron) as instructed and continue to stay hydrated.    2. SUPPLEMENTS: Start your supplements 1-2 weeks after surgery. You may need to cut some supplements in half, or crush them, initially. Follow the guidelines from your supplement handout given to you during your pre-op class.     3. ACTIVITIES: After discharge from the hospital, you may resume full routine activities. However, there should be no heavy lifting (greater than 15 pounds) and no strenuous activities until after your follow-up visit. Otherwise, routine activities of daily living are acceptable. More movement and walking after surgery will speed up your recovery process.    4. DRIVING: You may drive whenever you are off narcotic pain medications and are able to perform the activities needed to drive, i.e. turning, bending, twisting, etc, without significant pain.    5. BATHING AND WOUND CARE: You may get your incisions wet 24 hours after surgery. You may shower, but do not submerge in a bath, hot tub, or swimming pool for at least two weeks. If present, dressings may come off after 24 hours. Your incisions are covered with special skin glue - it should peel off on its own within the next few weeks. If you have  steri-strips covering your incisions, they will fall off over the next few weeks. You may notice some clear or slightly bloody drainage from your wounds and there may be some mild redness or bruising around your incision sites. This is normal.    6. BOWEL FUNCTION: Constipation is common after this operation, especially with narcotic pain medications. The combination of pain medication, dehydration, and decreased activity levels can cause constipation in otherwise normal patients. If you feel this is occurring, take a stool softener (Colace, Senna, etc.) or a laxative (Milk of Magnesia, Miralax, etc.) until the problem has resolved. Diarrhea, to a lesser degree, in the first few days after surgery can also be normal. You may notice that it is dark in color or see blood, which is also normal, and should subside in the first week post-op.    7. HOME MEDICATION/PAIN MEDICATION: You may resume all home medications as prescribed, except for your diabetic medications. Please reach out to your Primary Care Physician or Endocrinologist to discuss readjusting your diabetic medications. You have been given a prescription for pain medication preoperatively. Please take these as directed. It is important to remember not to take medications on an empty stomach as this may cause nausea. For minimal discomfort you may use liquid Tylenol 650 mg every 4 hours. DO NOT exceed 4,000 mg of Tylenol in 24 hours. DO NOT use non-steroidal anti-inflammatory medication such as: Aspirin, Ibuprofen, Advil, Motrin, Aleve; or steroid medications. These medications can cause ulcers after weight loss surgery.    8. CALL OUR OFFICE - [phone] - IF YOU HAVE:     1. Fevers to more than 101.5 F   2. Leg pain or swelling  3. Drainage or fluid from incision that may be foul smelling, increased tenderness or soreness at the wound, or the wound edges are no longer together, redness or  swelling at the incision site.   4. Night sweats   5. Shaking or  chills   6. Persistent nausea or vomiting for over 24 hours. Use your anti-nausea medication as prescribed.   7. Call 911 if sudden onset of chest pain or shortness of breath that does not improve with 5-10 minutes of rest.    9. FOLLOW UP APPOINTMENT: Contact our office at [phone] for a follow-up appointment within 2 weeks following your procedure. Our office hours are Monday-Thursday, 7.30 am-4.30 pm and from 8.00 am-3.00 pm on Friday. Please try to call during these hours when we are better able to assist you.    If you have any additional questions, please do not hesitate to call our office and speak to the staff or provider on call.    Office Address:  [facility]  [address] [place] [address]  [place] [address]    Thank you for this opportunity to provide excellent surgical care for you today.          Post-operative Nutrition Guidelines for Hernandez-en-Y Gastric Bypass, Vertical Sleeve Gastrectomy and Duodenal Switch        Hospital Guidelines    Immediately After surgery    Sleeve Gastrectomy    Day of surgery  Sips of water evening after surgery.    Day 1 1 oz (30 ml) of Optisource every hour and 1 oz (30 ml) of water every 15 minutes as needed    Discharge   Begin diet progression as recommended in your packet     (Clear and Full liquids)      Gastric Bypass & Duodenal Switch    Day of Surgery Sips of water evening after surgery.    Post Op Day 1 1 oz (30 ml) water every hour progress to Optisource in the evening, 1 oz every hour and 1 oz of water every 15 minutes as needed    Post Op Day 2 1 oz (30 ml) of Optisource every hour 1 oz (30 ml) of water every 15 minutes as needed        Discharge Begin diet progression as recommended in your packet    (Clear and Full liquids)    Note: 1 oz = 2 Tablespoons          Gastric Bypass / Sleeve / Duodenal Switch Surgery Nutrition Plan: Post-Op Week 1 and 2    Clear and Full Liquid Diet   Water  Protein Shakes  Beef or chicken broth  “Bone Broth” is  higher in protein  Broth based soups (strained)  Low fat cream soups (strained)  Sugar free popsicles, Jell-O, pudding  Light yogurt (no chunks)  Light Greek yogurt (no chunks)    Soupy hot cereals (no sugar or butter)  Oatmeal, cream of wheat, grits  Skim or 1 % milk  Fairlife Milk, fat free  Unsweetened soymilk or almond milk  Black decaf coffee or decaf/green tea   Gatorade G2, PowerAde Zero  Sugar free drinks   Crystal light, Propel    No carbonated beverages    Points to Remember:    Your primary goal is to stay hydrated and increasing your protein intake to meet your needs.  Goal: 64 ounces of fluids daily (most foods are included at this stage)  Drink 1 oz. of liquid every 10 minutes  Taking in fluids frequently will help you stay well hydrated. Be sure to drink slowly. Take tiny sips, Do Not Gulp. You will not feel thirsty. You may need to set a timer or keep a log.  Avoid concentrated sources of sugar (regular Jell-O, pudding, popsicle, etc.) to prevent Dumping Syndrome. Symptoms of Dumping Syndrome include abdominal pain, gas, cramping, diarrhea, nausea, and sweating.  Sugar substitutes such as Splenda, Sweet & Low, Equal, Stevia, and Monk Fruit are acceptable.  Meet your protein needs daily:  You will need a protein supplement while on the clear and full liquid diet stage.  Protein is important for healing, to fight infection, to preserve muscle mass, and to promote healthy weight loss.   Start your supplements  Must be in a chewable or liquid form for 2 months after surgery.  Start your Multivitamin, Calcium Citrate, and Vitamin B12.    Sample Liquid Meal Plan    Time Food   8 to 9 a.m. 6 ounces of protein drink   9 to 10 a.m. 2 ounces of PowerAde Zero  4 ounces water   10 to 11 a.m.                                      4 ounces of protein drink  2 ounces sugar free Jell-O   11 a.m. to 12 noon 6 ounces Gatorade G2   12 noon to 1 p.m. 4 ounces of protein drink  2 ounces sugar free pudding   1 to 2 p.m. 3  ounces of sugar free popsicle   3 ounces of beef broth   2 to 3 p.m. 4 ounces of protein drink  2 ounces water   3 to 4 p.m.  2 ounces of yogurt  4 ounces sugar free drink   4 to 5 p.m. 4 ounces of PowerAde Zero  2 ounces of chicken broth   5 to 6 p.m. 3 ounces of strained soup  3 ounces water   6 to 7 p.m. 4 ounces of protein drink   *Remember to eat and drink everything very slowly, taking tiny sips and small bites of food. Not eating or drinking slowly may result in vomiting or even lead to perforation, leaks or other complications        Gastric Bypass / Sleeve / Duodenal Switch Surgery Nutrition Plan: Week 3, 4, and 5    Pureed Diet  Diet Overview:    You will need to blenderize most of your food.  Allowable foods include:  All foods from post-op liquid diet  Blenderized canned tuna or chicken packed in water  Pureed baked chicken, turkey, or fish (be careful of bones)  Boiled eggs or scrambled eggs, mashed or pureed  Blended canned beans or low fat refried beans  Low fat cottage cheese (small curd)  Pureed cooked or canned vegetables and fruit   Baby fruits and vegetables (stage 1 or 2)    Points to Remember:    Continue to stay hydrated and meet your protein needs.  Eat 6 small meals per day.   Listen to your body; it is ok if you are only tolerating 2-3 meals the first few days.  Limit meals to 4-5 tablespoons of food; otherwise, you may have nausea, diarrhea, vomiting, gas, and stomach pain.  Allow 30 minutes between eating and drinking. It is okay to take tiny sips with your meals to help keep the food moist and easy to swallow.  Eat slowly, taking at least 20-30 minutes to eat a meal or snack. Use a small bowl or plate, baby/children's spoon, or seafood fork to remind you to eat slowly.   Each bite of food should be pea sized and should be chewed 20-30 times each bite.  Set your fork or spoon down between bites can help you eat slower.  Eat protein foods first to get the protein your body needs prior to  feeling full  Include protein rich foods (meat, milk, cheese, yogurt, egg) with each meal and snack.      Sample Pureed Menu    Time Food   7 to 7:30 a.m.   Breakfast 1 scrambled egg   2 Tbsp. oatmeal   8 to 9 a.m. 8 ounces water   10 to 10:30 a.m.     Snack                                   8 ounces protein shake   11 a.m. to 12 Noon 8 ounces Crystal Light   12:30 to 1 PM        Lunch 3 Tbsp. blended Tuna salad  2 Tbsp. pureed fruit   1:30 to 2:30 p.m. 12 ounces Gatorade G2   3:30 to 4 p.m.        Snack ¼ cup low fat cottage cheese   4 to 5 p.m.  8 ounces water   5 to 5:30 p.m. 4 ounces Crystal Light   6 to 6:30 p.m.        Dinner 3 Tbsp. puree chicken  2 Tbsp. puree vegetables   7 to 8:30 8 ounces Gatorade G2   9 to 9:30                 Snack ¼ cup light Greek yogurt   10 to 11 8 ounces water     Gastric Bypass / Sleeve / Duodenal Switch Surgery Nutrition Plan: Week 6    Soft Diet  During this stage of the nutrition plan, you will be consuming solid foods that are well cooked. You may now begin to slowly introduce solid foods into your diet. It is important to avoid foods that are hard to digest because they may cause discomfort or blockage in the stomach pouch. It may help to introduce one new food at a time to help you identify foods you may not tolerate.    Foods to include:  Baked fish, chicken, and turkey (DO NOT eat any poultry skin)  Lean ground beef or turkey (needs to be soft)  Dried beans, peas, and lentils (well cooked)  Creamy peanut butter  Vegetables (canned or well cooked)  Canned fruit (unsweetened, in juice, or 'lite' version- do not drink the juice)    Allowed foods in small amounts:  Cereal, crackers, baked potato (no skin)  Toasted bread, saltine crackers, or Vanessa toast    Points to Remember:  Eat 6 small meals per day.  No more than 6-8 Tbsp. or 3-4 ounces of food at one time.  Liquids in between meals. You may continue to take small sips with your meals.  Protein supplements may still be  used as a meal or snack when necessary.  Meet your protein and fluid needs to help prevent infection and dehydration.  Remember to continue eating slowly and chewing well.      Sample Meal Plan: Soft Diet    Meal Day 1 Day 2 Day 3   Breakfast +  Multivitamin  Vit B12 1 scrambled Egg  2 oz. Grits   1 egg omelet  1 oz. low fat cheese  ½ slice of toast High Protein oatmeal  (2 Tbsp. powder peanut butter, ½ cup skim milk, 2 Tbsp. oatmeal)    Snack +  Calcium Protein Shake   Protein Bar 6-8 ounces skim milk or Fairlife skim milk   Lunch    2 oz. Cottage cheese  1 oz. light canned peaches   2 oz. Tuna Salad  3-4 cooked baby carrots 2 oz. Egg salad  3 Crackers   Snack +  Calcium 2 oz. turkey cheese roll  ½ slice of toast 1 oz. Low fat cheese  ¼ cup applesauce 2-3 oz. low fat turkey chili   Dinner +  Multivitamin   2 oz. Baked Fish  1-2 oz. Mashed potato   2 oz. Lean ground beef  1 oz. Black beans  1 oz. Cantaloupe 2 oz. Baked Chicken  1-2 oz. mash sweet potato   Snack 2 oz. Light Greek yogurt  ? small banana 2 Tbsp. Peanut butter   2-3 saltine crackers Protein Shake     Meal Day 4 Day 5 Day 6   Breakfast +  Multivitamin  Vit B12 1 scrambled Egg  2 oz. Oatmeal 2 Tbsp. Peanut butter  ½ English muffin toasted   1 scrambled Egg  ¼ cup cereal + ¼ cup milk      Snack +  Calcium 2 oz. Three Bean Salad Protein shake 2 oz. Light Greek yogurt  ? small banana   Lunch    2 oz. Tuna Salad  1 oz. watermelon  3 Crackers 2 oz. Cottage Cheese  1 oz. canned peaches   ¼ cup Chicken Salad  1-2 oz. mash sweet potato   Snack +  Calcium Protein Shake   2 oz. Sugar free pudding + 2 Tbsp. protein powder Protein shake    Dinner +  Multivitamin 2 oz. ground Turkey  2 Tbsp. mixed vegetables   2 oz. Baked Fish  2 Tbsp. green beans 2 ounces baked Chicken  3-4 cooked baby carrots   Snack 1 oz. Low Fat cheese   3 oz. Light Greek yogurt 1 oz. Low fat cheese  2 Tbsp. applesauce     *Drink water and low calorie liquids between meals to prevent  dehydration  *Drink water and low calorie liquids between meals to prevent dehydration      Gastric Bypass / Sleeve / Duodenal Switch Surgery Nutrition Plan: Week 7    Regular Diet  Slowly increase the toughness and texture of foods as your body allows.  Vegetables will need to be cooked for the first few months, but not as soft.    Points to Remember:  Continue eating 6 meals daily with no more than 3-4 ounces of food per meal.  Gradually add new foods, to find which foods you tolerate best.  Allow 30 minutes between meals and liquids. Aim for 64 oz. of liquid daily.  Continue meeting your protein needs daily.  Continue taking Multivitamin, Calcium Citrate, and Vitamin B12 as recommended.  The following foods are common intolerances:  Nuts and seeds  Skins of potato, cucumber, eggplant, and apples  Membrane between sections of oranges and grapefruits  Celery, asparagus stems, string beans  Untoasted bread, soft or under cooked pasta and  rice  Steak and pork  Avoid foods high in calories, sugar, and fat such as:  Fried foods  Foods with gravy or sauce  Desserts  Sugar containing beverages (juice, soda, sweet tea)  Chips  Long Term Essentials:  Ask yourself “Should” rather than “Could” you eat something  Do not allow comfort foods in the home or where you will see them  Find comfort outside of food  Be careful what you read online  Stay active, aim for 150 minutes of physical activity per week   5 days of 30 minutes of walking, exercise, swimming  Avoid straws  No Carbonation  Monitor portion sizes       Protein sources  Food One Serving   Lean beef 1 ounce   Pork tenderloin 1 ounce   Chicken breast 1 ounce   Turkey breast 1 ounce   Luncheon meat (turkey breast, roast beef, lean ham) 1 ounce (be sure to read the label)   Creamy peanut butter 2 tablespoons   Egg 1 large   Egg beaters ¼ cup   Egg whites 2-3   Fish  1 ounce   Shrimp 5-6   Canned tuna or salmon 1 ounce or ¼ cup   Low-fat cheese 1 ounce (1” cube)    Low-fat or fat free cottage cheese ½ cup   Tofu ¼ cup   Beans   Canned or dry  (does not include green beans) ½ cup   Milk (skim or 1%) 1 cup   Greek yogurt ½ cup   Low sugar yogurt 1 cup   Fairlife milk (skim or 1%) ½ cup   Soymilk 1 cup   One serving provides 7 grams of protein   *Read the Nutrition Facts label on selected food packages to exact amounts of protein is provided by a product.     Measuring portion sizes of foods can be very beneficial to determining your total protein intake.  A deck of cards or the palm of your hand is approximately 3 oz. of protein rich food.  Use a measuring cup to measure serving sizes in this group are listed in cups. Note most of our protein comes from meats, poultry, fish, eggs, cheese, beans, milk, and dairy products.      Potential Problems and Suggested Dietary Modifications    Potential Problem Dietary Modifications   Constipation Meet fluid needs daily, aiming for 64+ ounces per day.  Increase fiber intake slowly from fruits, vegetables, beans, and whole grains pending on diet stage.  Consider adding a chewable or powdered fiber supplements such as Benefiber or Citrucel. Limit psyllium (Metamucil) based supplements.  Exercise daily.   Nausea and Vomiting Eat slowly, taking 20-30 minutes per meal.  Chew food well, 20-30 times per bite.  Avoid overeating. Stay within recommended food volume guidelines.  Separate eating and drinking by 30-60 min.  Follow diet progression as instructed. Do not advance to solid food until instructed.  Meet fluid needs daily, aiming for 64+ ounces per day.    Diarrhea Limit sugar intake to no more than 15 grams per meal.  Avoid sugar alcohols (i.e. sorbitol, mannitol, xylitol).  Avoid high fat and greasy foods.  Try eliminating dairy as lactose intolerance sometimes occurs after bariatric surgery.   Difficulty Swallowing Eat slowly, taking 20-30 minutes per meal.  Chew food well, 20-30 times per bite or food is liquefied in mouth.  Avoid  overeating. Stay within recommended food volume guidelines.  Avoid tough or rubbery meats, crunchy vegetables, and sticky foods like untoasted bread.   Burping Eat slowly, taking 20-30 minutes per meal.  Chew foods longer to avoid swallowing air.  Avoid carbonated beverages, chewing gum, and sipping through straws or sport bottles tops.         Vitamin and Mineral Needs    Vitamin deficiencies are common among patients that do not take their vitamins. The only way to tell if you are absorbing your vitamins properly is getting your lab work completed regularly. (See [facility] packet for recommended labs)    Multivitamin  Bariatric vitamins are preferred.  The reasons include:  Formulated based on research to meet your specific needs after surgery.  Higher quality and more likely to contain what is on the label compared to an over the counter vitamin.  Easy to read directions (you take 1 serving as directed)  Better absorbed if taken in 2-3 doses spread throughout the day.    If you choose an over the counter multivitamin, remember:  Each serving must contain 18 mg iron, 400 mcg folic acid, thiamine, and zinc  Additional thiamine and zinc will be needed  Take 2 SERVINGS per day (if 1 serving = 2 pills, you will need 4 pills total)  Look for regular adult. No 50+, silver, men, or gummy vitamins.  Calcium Citrate  3238-5931 mg daily of Calcium Citrate.  Split calcium into 2-3 doses (400-600 mg) throughout the day.  Read Serving Size carefully; If 1 serving = 2 pills, you may need 6 pills per day.  Take at least 2 hours before or after other vitamins with iron  May also be called “Tricalcium Citrate”  Beware: Calcium Carbonate is the most common form found in stores, but is poorly absorbed after surgery!  Vitamin B12  Daily sublingual tablet or liquid 500-1000 mcg/day, dissolves under your tongue  Monthly intramuscular injections are adequate substitutes.  Vitamin D3:    Total daily needs are 3000 IU.    Add  the amount provided by your multivitamin and calcium supplements.  An additional Vitamin D3 may be required based on your labs and the vitamins you choose.    Combination bariatric supplements: multivitamin and calcium  Take recommended dose spread throughout the day  If 4 tablets are recommended daily, take 1 at a time to increase absorption  May need additional supplements; such as calcium citrate, iron, and thiamine    Multivitamin and Calcium Supplement Suggestions*   Multivitamins Dose Location   Bariatric Advantage Multi EA Chewable  2 per day [email or web address]    Bariatric Advantage Ultra Multi Capsules  (with or without iron) 3 per day [email or web address]    Bariatric Advantage Chewy Bites  2 per day  Separate iron needed [email or web address]    Celebrate Multi-Complete 2 per day [email or web address]    Celebrate Multivitamins Chewable 2 per day  Separate iron needed [email or web address]    Opurity 1 per day [email or web address]    Calcium Citrate Dose Location   Bariatric Advantage Calcium Citrate Chewable 3 per day [email or web address]    Bariatric Advantage Chewy Bites   (250 mg or 500 mg) 500 mg- 3 per day  250 mg-5 per day [email or web address]    Bluebonnet Liquid CALCIUM Magnesium Citrate Plus Vitamin D3 2 Tbsp. per day Health food stores   Building Blocks Calcium Citrate 2 per day  (1200 mg) [email or web address]    Calcet Citrate Creamy Bites 3 per day  (1500 mg) Drugstores   Citracal Petites Tablets 6 tablets per day  (1200 mg) Drugstores   Celebrate Calcium Plus Chewable  (250 mg or 500 mg) 500 mg- 3 per day  250 mg-5 per day [email or web address]    Celebrate Calcium Soft Chews 3 per day  (1500 mg) [email or web address]    Opurity Calcium Citrate Plus 4 per day  (1200 mg) [email or web address]    [place] Light Calcium Citrate Chew 4 per day  (1300 mg) [employer] food Progression Labs     All-in-One Multivitamin and Calcium Dose Location   [facility]  Bariatric Vitamin &  Mineral 4 per day; Additional 500 mg Calcium Citrate needed [employer]   Bariatric Fusion Vitamin & Mineral 4 per day [email or web address]    Bariatric Choice All-in-one 4 per day [email or web address]    *Please note: we do not require the use of any particular vendor or product. This is not meant to be an exhaustive list since other products may meet your need. Please consult with your medical team if you have any questions.     Protein Supplement Suggestions     Drinks Kcal Protein Sugar Fat Where To Buy   Atkins Lift Protein Drink 90 20 0 0 Grocery Stores   Bariatric Advantage Meal Replacements, 1 packet 160 27 0.5 2 [email or web address]  [phone]   HEALTH -discount code   Body Fortress Whey Isolate Protein, 1 scoop 140 30 3 4 [employer]   Boost Glucose Control, 8 oz. 190 16 4 7 Grocery Stores   Quest Protein Powder, Vanilla 1 scoop 100 22 <1 0 Grocery Stores  Note: Some flavors contain sugar alcohols   Celebrate Meal Replacement, 2 scoops 150 27 4 0.5 [email or web address]  [phone]     OhYeah! Shake, 14 oz. 220 32 3 9 Grocery Stores   EAS AdvantEDGE Carb Control, 11 oz. 100 17 0 3 Grocery Stores   Nature's Best Isopure Zero Carb, 20 oz.  160 40 0 0 [employer]NC, The Vitamin Shoppe   Muscle Milk Genuine, Vanilla Creme, 14 ounce 160 25 0 5 Grocery Stores   Optisource, 8 oz. 200 24 0 6 www.[email or web address] or  [phone] ([place])   Premier Protein 160 30 1 3 Grocery Stores     Slim Fast Advanced Nutrition, 11 oz. 180 20 1 9 Grocery Stores   Syntrax [place], 1 scoop 100 23 0 0 wwwInnovus Pharma  [phone]   Unjury Powder, 1 scoop or 1 packet 80 20 0 0 www.[email or web address]   [phone]   Beneprotein powder (unflavored), 1 scoop 25 6 0 0 www.[email or web address] [phone] ([place])   * If you are adding a protein powder to Milk, remember to add the sugar and protein content to what is provided by the shake.   Skim Milk, 8 oz. 90 8 12 0 Regular of lactose free   [facility], Skim Milk, 8  80 13 6 0     Soymilk, 8 oz. 80 7 1 4 Unsweetened   [place] Milk, 8 oz. 30-40 1 0 2.5 Unsweetened   Please note: we do not require the use of any particular vendor or product. This is not meant to be an exhaustive list since other products may meet your need. Please consult with your medical team if you have any questions.     Multivitamin  -High potency formula containing at -least 100-200% of most vitamins and minerals  -Take chewable/liquid for 2 months  -Iron: total of 36 mg/d (divide dose)  -Folic Acid: 400-1000 mcg/d  -Selenium  -Zinc, Copper    Formulas must contain:    Calcium Supplement  -7711-4875 mg calcium citrate   (Divide in doses of 500-600 mg)  -400-800 IU vitamin D  -Take chewable/liquid for 2 months  -Separate from iron by 2 hours    Protein Shake Ideas  PB&J  4 oz. strawberry light Greek yogurt  4 oz. milk  1 scoop adrián. protein powder  1 Teaspoon powdered peanut butter    Vanilla Latte (or Mocha)  8 ounces milk  1 Teaspoon Decaf Instant Coffee  1 pack stevia or Splenda  1 scoop Vanilla protein powder  (Or chocolate protein for Mocha)    *Banana  4 ounces low fat yogurt  4 ounces milk  1 small banana  1 scoop vanilla protein powder  Blend all ingredients until smooth   *Add 1-2 Teaspoon powder peanut butter to make peanut butter banana shake    Macario Spice   8 ounces water  1 scoop vanilla protein powder  1 Macario Tea bag (herbal tea)  1 packet Splenda   Heat water, then steep Macario tea for 5 minutes, allow to cool and add protein powder    Mint Chocolate  Recipes adapted from [employer].  Unless specifically stated, milk is per taste preference.  Examples include Skim Milk, Unsweetnened Soy Milk, Fairlife Milk, [place] Milk, and Skim Lactaid Milk.   Protein content depends on protein powder and choice of milk.  No canned coconut milk.  Mix ingredients in shaker cup or  unless otherwise stated.  May substitute ready-made protein drinks in place of milk and protein powder for most recipes.  8 ounces milk  1 scoop  chocolate protein powder  1-2 drops of peppermint extract    Hot Chocolate  8 ounces milk  1 scoop chocolate protein powder  Heat milk until luke-warm (do not overheat) and then mix protein powder    Strawberry Lemonade  1 scoop strawberry protein powder  ½ Lemonade Crystal Light single serve packet  8 ounces water    Pumpkin Spice  8 ounces of milk  1 scoop vanilla protein powder  1 Tablespoon Canned Pumpkin  ¼ teaspoon pumpkin spice (or to taste)  2 packets Splenda    Peach   4 ounces light Strawberry Greek yogurt  4 ounces milk  1 scoop vanilla protein powder  ¼-cup canned/frozen peaches (packed in juice-strained)  Blend all ingredients until smooth    Creamsicle  8 ounces water  ½ packet orange crystal light  1 scoop Vanilla protein powder

## 2023-12-23 NOTE — CARE PLAN
The patient is Stable - Low risk of patient condition declining or worsening    Shift Goals  Clinical Goals: pain control, monitor surgical site, ambulation  Patient Goals: pain control, ambulation  Family Goals: update on POC    Progress made toward(s) clinical / shift goals:    Problem: Pain - Standard  Goal: Alleviation of pain or a reduction in pain to the patient’s comfort goal  Outcome: Progressing     Problem: Knowledge Deficit - Standard  Goal: Patient and family/care givers will demonstrate understanding of plan of care, disease process/condition, diagnostic tests and medications  Outcome: Progressing     Problem: Fall Risk  Goal: Patient will remain free from falls  Outcome: Progressing

## 2023-12-23 NOTE — CARE PLAN
The patient is Stable - Low risk of patient condition declining or worsening    Shift Goals  Clinical Goals: pain management, monitor surgical site, nausea control  Patient Goals: rest  Family Goals: update on POC    Progress made toward(s) clinical / shift goals:  Pt medicated per MAR for pain and nausea, resting. Pt tolerating small sips. IVF in place. Family and pt updated on POC. Will continue to monitor.     Patient is not progressing towards the following goals:

## 2023-12-23 NOTE — OR NURSING
Pt on 4 L oxy-mask at this time as pt breaths thru her mouth and did not tolerate NC.  Pt uses CPAP at night.  X 4 laparoscopic abdominal sites intact with Dermabond.  X 1 site small amount of serosanguineous fluid, 2x2/Tegaderm in place.  Ice packs to abdomen.  Surgical pain tolerable per pt, 4/10.  VSS, afebrile, RUSSO, A/O x4.     Glasses in place.   Family has pt's belongings and CPAP.   Oxygen tank 75% full.     Bedside handoff to Janet CHAO.

## 2023-12-23 NOTE — PROGRESS NOTES
"[facility]  Progress Note      POD #1, s/p robotic sleeve gastrectomy and wedge liver biopsy (segment III).    Subjective:     Reports feeling reasonably well. Notes mild to moderate amount of incisional/abdominal pain (most notably at the RUQ incision), some epigastric/substernal chest pain & tightness, mild dysphagia and early satiety. Otherwise, no recent history of fevers/chills, vomiting/hematemesis, odynophagia, CP/SOB, bloating/belching, worsening abdominal pain, dysuria/hematuria, BRBPR/melena, or constipation/diarrhea. No other symptoms or complaints at this time. Tolerating sips of clear liquids and water, ambulating in the hallways w/o difficulties and voiding spontaneously.    Objective:    /52   Pulse 68   Temp 36.6 °C (97.9 °F) (Temporal)   Resp (!) 25   Ht 1.372 m (4' 6\")   Wt 107 kg (236 lb 8.9 oz)   SpO2 94%     I/O last 3 completed shifts:  In: 2121.5 [P.O.:125; I.V.:1996.5]  Out: 25     Current Facility-Administered Medications   Medication Dose    polyethylene glycol/lytes (Miralax) Packet 1 Packet  1 Packet    lactated ringers infusion      Pharmacy Consult Request ...Pain Management Review 1 Each  1 Each    acetaminophen (Tylenol) tablet 1,000 mg  1,000 mg    Followed by    [START ON 12/27/2023] acetaminophen (Tylenol) tablet 1,000 mg  1,000 mg    ketorolac (Toradol) injection 15 mg  15 mg    oxyCODONE (Roxicodone) oral solution 5 mg  5 mg    Or    oxyCODONE (Roxicodone) oral solution 10 mg  10 mg    Or    HYDROmorphone (Dilaudid) injection 0.5 mg  0.5 mg    ondansetron (Zofran) syringe/vial injection 4 mg  4 mg    promethazine (Phenergan) suppository 25 mg  25 mg    simethicone (Mylicon) chewable tablet 125 mg  125 mg    benzocaine-menthol (Cepacol) lozenge 1 Lozenge  1 Lozenge    heparin injection 7,500 Units  7,500 Units    pantoprazole (Protonix) injection 40 mg  40 mg    dexamethasone (Decadron) injection 4 mg  4 mg    gabapentin (Neurontin) capsule 300 mg  300 " mg    metoclopramide (Reglan) injection 10 mg  10 mg    methocarbamol (Robaxin) tablet 1,000 mg  1,000 mg     Physical Exam:     Gen - comfortable, NAD, A&O x 3  HEENT - anicteric, PERRLA  CV - regular rate and rhythm  Abd - soft, + min TTP @ epigastrium and RUQ, no rebound/guarding, no distention, no palp masses or bulges  Inc - all lap incisions are C/D/I - no evidence of infection or bulges; + min bruising at all incisions  Ext - no clubbing, cyanosis or edema bilaterally  Skin - no rashes/lesions, no open wounds, no jaundice    Labs:    Recent Labs     12/23/23  0145   WBC 12.7*   RBC 5.27   HEMOGLOBIN 13.1   HEMATOCRIT 42.8   MCV 81.2*   MCH 24.9*   MCHC 30.6*   RDW 48.1   PLATELETCT 235   MPV 11.2     Recent Labs     12/23/23  0145   SODIUM 136   POTASSIUM 4.5   CHLORIDE 105   CO2 17*   GLUCOSE 142*   BUN 13     Imaging:    No results found.    Assessment/Plan:    Morbid obesity (BMI of 57 kg/m2)  HTN, DELIA on CPAP, and GERD  Fatty liver disease (visual evidence) - biopsied    Now, POD #1, s/p robotic sleeve gastrectomy and wedge liver biopsy (segment III).    Postoperative recovery is progressing as expected. Pain and nausea are well controlled. Tolerating sips of clear liquids and water, ambulating in the hallways w/o difficulties and voiding spontaneously.    Will continue to encourage increased PO intake + clear protein shakes throughout the day. In addition, will encourage ambulation in the hallways and use of ICS while resting in bed. Patient may be stable for discharge home tomorrow AM, with close follow up in the Surgery clinic (appointment has been scheduled already), if continues to recover well.    Thank you for giving us this opportunity to care for this patient.    Bill Collins MD MPH FACS [facility]  Bariatric and Gastroesophageal Surgery  [facility]  Clinical Assistant Professor of Surgery  [facility]

## 2023-12-23 NOTE — PROGRESS NOTES
Report received from RN, assumed care at 0800  Pt is A0X4, and responds appropriately   Pt declines any SOB, chest pain, new onset of numbness/ tingling  Pt rates pain at 8/10, on a scale of 1-10, pt medicated per MAR  Pt is voiding adequately and without hesitancy  Last BM PTA  Pt ambulates with a standby assist  Pt is tolerating a bariatric clears diet, pt denies any nausea/vomiting  Plan of care discussed, all questions answered. Explained importance of calling before getting OOB and pt verbalizes understanding. Explained importance of oral care. Call light is within reach, treaded slipper socks on, bed in lowest/ locked position, hourly rounding in place, all needs met at this time

## 2023-12-23 NOTE — ANESTHESIA PROCEDURE NOTES
Airway    Date/Time: 12/22/2023 4:40 PM    Performed by: Patrick Gilbert M.D.  Authorized by: Patrick Gilbert M.D.    Location:  OR  Urgency:  Elective  Difficult Airway: No    Indications for Airway Management:  Anesthesia      Spontaneous Ventilation: absent    Sedation Level:  Deep  Preoxygenated: Yes    Patient Position:  Sniffing  Mask Difficulty Assessment:  0 - not attempted  Final Airway Type:  Endotracheal airway  Final Endotracheal Airway:  ETT  Cuffed: Yes    Technique Used for Successful ETT Placement:  Direct laryngoscopy  Devices/Methods Used in Placement:  Intubating stylet    Insertion Site:  Oral  Blade Type:  Rose  Laryngoscope Blade/Videolaryngoscope Blade Size:  2  ETT Size (mm):  6.5  Measured from:  Teeth  ETT to Teeth (cm):  21  Placement Verified by: auscultation and capnometry    Cormack-Lehane Classification:  Grade I - full view of glottis  Number of Attempts at Approach:  1   Care was taken to not hyperextend or flex her neck.

## 2023-12-23 NOTE — OP REPORT
[facility]    OPERATIVE REPORT         DATE OF OPERATION: 12/22/2023    SURGEON: Bill Collins MD MPH FACS [facility]    ASSISTANT(S): Memo Hernandez PA-C    ANESTHESIOLOGIST(S): Patrick Gilbert MD    ANESTHESIA: General with local anesthetic    PREOPERATIVE DIAGNOSES:   Morbid obesity (BMI of 57 kg/m2)  HTN, DELIA on CPAP, and GERD    POSTOPERATIVE DIAGNOSES:   Morbid obesity (BMI of 57 kg/m2)  HTN, DELIA on CPAP, and GERD  Fatty liver disease (visual evidence) - biopsied    OPERATION(S) PERFORMED:   Robotic sleeve gastrectomy  Robotic wedge liver biopsy (segment III)    ESTIMATED BLOOD LOSS: 10 ml    SPECIMEN(S):   Sleeve gastrectomy  Wedge liver biopsy (segment III)    COMPLICATIONS: None    BACKGROUND: The patient is a 33 y.o. female with a diagnosis of morbid obesity, whose current Body mass index is 57.04 kg/m². Her comorbidities include: HTN, DELIA on CPAP, and GERD. The patient has failed multiple attempts at non-surgical weight loss. Thus, she presents to undergo a robotic vs laparoscopic sleeve gastrectomy, and possible hiatal cruroplasty, for treatment of morbid obesity.     We discussed the risks of surgery including infection, bleeding, need for transfusion, blood clot formation, pulmonary embolus, pneumonia, leak or perforation, recurrence of symptoms, and death. In addition, the risks of general anesthetic were discussed, including MI, CVA, reaction to anesthetic medications, or sudden death. The patient understands all of the above risks and all questions were answered to her satisfaction.    OPERATIVE FINDINGS: During this procedure, patient was noted to have a significantly enlarged, fatty-appearing, liver. Thus, a wedge liver biopsy was performed for further evaluation. There was no evidence of a significant hiatal hernia and gastric anatomy was normal, otherwise.    OPERATIVE PROCEDURE: Following obtaining informed consent, the patient was brought to the operating room and placed  "in the supine position. A preoperative dose of antibiotics was administered, as well as, subcutaneous heparin in the preoperative holding area. General anesthesia was smoothly induced. The abdomen was prepped and draped in the usual sterile fashion. Following a formal time-out and site verification, the procedure was undertaken.    A small skin incision was created in the left upper quadrant, at the \"Scott's point\", and a Veress needle was inserted without incident. The saline drip test was negative. Pneumoperitoneum was created with CO2 to 15 mmHg pressure without any physiologic derangements. A 5 mm blunt tipped working port was placed approximately 14 cm below the xyphoid and to the left of umbilicus. It was placed without incident and a 0 degree 5 mm laparoscope was introduced into the abdomen with its camera attached. No injuries were noted from initial or subsequent trocar placement. Four robotic trocars - three 8 mm and one 12 mm - were placed in the bilateral upper quadrants, all under direct vision. The patient was placed in a reverse Trendelenburg position. The da Renetta Xi robotic system was then successfully docked above the patient's epigastrium and the remainder of this operation was performed with assistance of the robot.    Initial diagnostic laparoscopy demonstrated evidence of a significantly enlarged, fatty-appearing, liver. Thus, a wedge liver biopsy was performed in the segment III of left liver lobe with robotic monopolar scissors. Hemostasis was ensured with bipolar energy device and the specimen was extracted through our 12 mm trocar and sent off for further evaluation to Pathology. Next, beginning 5 cm proximal to the pylorus and staying along the greater curvature, the Vessel sealer was used to transect the greater omentum and short gastric vessels up to the gastroesophageal junction and angle of His. All retroperitoneal attachments to the posterior wall of stomach were also transected by " using the Vessel sealer. Special care was undertaken to avoid the patient's left gastric artery and splenic vessels. Exploration of the gastroesophageal junction was then undertaken. The right and left crura were identified. No significant hiatal hernia was found.    Robotic SureForm EL 60-mm stapling device was then inserted after a 40-Citizen of [place] bougie was passed into the stomach. The sleeve gastrectomy was initiated with a blue cartridge load, starting ~5 cm proximal to the pylorus and proceeding along the greater curvature. The stomach was sequentially stapled with additional white loads, progressing around the 40-Citizen of [place] bougie tube, until the sleeve was completely free from the remnant stomach. The staple line was inspected and found to be intact. The previously transected omentum was then buttressed to several sites along the staple line by using a 2-0 Vicryl suture. A leak test was performed by instilling saline around the staple line and insufflating the stomach with ~ 200 ml of air via the orogastric tube. The leak test was negative. The EndoCatch bag was then inserted around the remnant stomach, which was placed into the bag and removed from the abdominal cavity. The fascia at the 12-mm trocar was then reapproximated by using a laparoscopic suture passer and 0-Vicryl suture. The skin at all trocar sites was reapproximated using 4-0 Monocryl. Local anesthetic was injected into all trocar sites. The abdomen was cleaned and dried. Dermabond sterile skin glue was applied to all incisions. The patient was then awakened from anesthesia and transferred to the Post Anesthesia Care Unit in a stable condition. At the conclusion of the case, the sponge, needle, and instrument counts were correct x 2.    Dr. Collins was present and scrubbed throughout the entirety of this case.

## 2023-12-23 NOTE — ANESTHESIA TIME REPORT
Anesthesia Start and Stop Event Times       Date Time Event    12/22/2023 1633 Anesthesia Start     1650 Ready for Procedure     1759 Anesthesia Stop          Responsible Staff  12/22/23      Name Role Begin End    Patrick Gilbert M.D. Anesth 1633 1755          Overtime Reason:  no overtime (within assigned shift)    Comments:

## 2023-12-23 NOTE — PROGRESS NOTES
4 Eyes Skin Assessment Completed by ZHANNA Vasquez and ZHANNA Preston.    Head WDL  Ears WDL  Nose WDL  Mouth WDL  Neck WDL  Breast/Chest WDL  Shoulder Blades WDL  Spine WDL  (R) Arm/Elbow/Hand WDL, PIV  (L) Arm/Elbow/Hand WDL  Abdomen x4 lap sites, right most with DIP  Groin WDL  Scrotum/Coccyx/Buttocks WDL  (R) Leg WDL  (L) Leg WDL  (R) Heel/Foot/Toe WDL  (L) Heel/Foot/Toe WDL          Devices In Places Blood Pressure Cuff, Pulse Ox, SCD's, and Oxy Mask      Interventions In Place Pillows    Possible Skin Injury No    Pictures Uploaded Into Epic N/A  Wound Consult Placed N/A  RN Wound Prevention Protocol Ordered No

## 2023-12-23 NOTE — DIETARY
NUTRITION SERVICES: BMI - Pt with BMI >40 (=Body mass index is 57.04 kg/m².), morbid obesity. Weight loss counseling not appropriate in acute care setting. Pt s/p robotic sleeve gastrectomy and wedge liver biopsy  anticipate F/u planned with MD/RD post D/c. RECOMMEND - Referral to outpatient nutrition services for weight management, or F/u with MD/RD after D/C.

## 2023-12-23 NOTE — PROGRESS NOTES
"Pt  admitted to room 409 via transport in [facility] from PACU at 2000.  Pt pain reported at 5 on a scale of 0-10. Oriented to room call light and smoking policy.  Reviewed plan of care (equipment, incentive spirometer, sequential compression devices, medications, activity, diet, fall precautions, skin care, and pain) with patient and family. Welcome packet given and reviewed with pt , all questions answered. Education provided on oral hygiene program.     /58   Pulse 97   Temp 36.4 °C (97.6 °F) (Temporal)   Resp 20   Ht 1.372 m (4' 6\")   Wt 107 kg (236 lb 8.9 oz)   SpO2 95%   BMI 57.04 kg/m²       Educated patient regarding pharmacologic and non pharmacologic modalities for pain management. Oriented to room call light and smoking policy.  Reviewed plan of care (equipment, incentive spirometer, sequential compression devices, medications, activity, diet, fall precautions, skin care, and pain) with patient and family. Welcome packet given and reviewed with patient, all questions answered. Education provided on oral hygiene program.    AA&Ox4. Denies CP/SOB.  See 2 RN skin note  Tolerating bariatric clear diet. Denies N/V.  - void. - BM. Last BM PTA  Pt ambulates SBA.  All needs met at this time. Call light within reach. Pt calls appropriately. Bed low and locked, non skid socks in place. Hourly rounding in place.  "

## 2023-12-24 ENCOUNTER — APPOINTMENT (OUTPATIENT)
Dept: RADIOLOGY | Facility: MEDICAL CENTER | Age: 33
End: 2023-12-24
Attending: STUDENT IN AN ORGANIZED HEALTH CARE EDUCATION/TRAINING PROGRAM
Payer: MEDICAID

## 2023-12-24 ENCOUNTER — HOSPITAL ENCOUNTER (EMERGENCY)
Facility: MEDICAL CENTER | Age: 33
End: 2023-12-25
Attending: STUDENT IN AN ORGANIZED HEALTH CARE EDUCATION/TRAINING PROGRAM
Payer: MEDICAID

## 2023-12-24 VITALS
OXYGEN SATURATION: 94 % | TEMPERATURE: 97.9 F | DIASTOLIC BLOOD PRESSURE: 55 MMHG | RESPIRATION RATE: 20 BRPM | WEIGHT: 236.55 LBS | HEART RATE: 49 BPM | HEIGHT: 55 IN | SYSTOLIC BLOOD PRESSURE: 122 MMHG | BODY MASS INDEX: 54.74 KG/M2

## 2023-12-24 DIAGNOSIS — G89.18 POST-OP PAIN: ICD-10-CM

## 2023-12-24 LAB
ERYTHROCYTE [DISTWIDTH] IN BLOOD BY AUTOMATED COUNT: 46.9 FL (ref 35.9–50)
GLUCOSE BLD STRIP.AUTO-MCNC: 141 MG/DL (ref 65–99)
HCT VFR BLD AUTO: 37.9 % (ref 37–47)
HGB BLD-MCNC: 11.9 G/DL (ref 12–16)
MCH RBC QN AUTO: 24.7 PG (ref 27–33)
MCHC RBC AUTO-ENTMCNC: 31.4 G/DL (ref 32.2–35.5)
MCV RBC AUTO: 78.8 FL (ref 81.4–97.8)
PLATELET # BLD AUTO: 307 K/UL (ref 164–446)
PMV BLD AUTO: 10.4 FL (ref 9–12.9)
RBC # BLD AUTO: 4.81 M/UL (ref 4.2–5.4)
WBC # BLD AUTO: 14.3 K/UL (ref 4.8–10.8)

## 2023-12-24 PROCEDURE — 99283 EMERGENCY DEPT VISIT LOW MDM: CPT

## 2023-12-24 PROCEDURE — 36415 COLL VENOUS BLD VENIPUNCTURE: CPT

## 2023-12-24 PROCEDURE — 700111 HCHG RX REV CODE 636 W/ 250 OVERRIDE (IP): Performed by: SURGERY

## 2023-12-24 PROCEDURE — 85027 COMPLETE CBC AUTOMATED: CPT

## 2023-12-24 PROCEDURE — 700102 HCHG RX REV CODE 250 W/ 637 OVERRIDE(OP): Performed by: STUDENT IN AN ORGANIZED HEALTH CARE EDUCATION/TRAINING PROGRAM

## 2023-12-24 PROCEDURE — 700102 HCHG RX REV CODE 250 W/ 637 OVERRIDE(OP): Performed by: SURGERY

## 2023-12-24 PROCEDURE — C9113 INJ PANTOPRAZOLE SODIUM, VIA: HCPCS | Performed by: SURGERY

## 2023-12-24 PROCEDURE — 82962 GLUCOSE BLOOD TEST: CPT

## 2023-12-24 PROCEDURE — A9270 NON-COVERED ITEM OR SERVICE: HCPCS | Performed by: SURGERY

## 2023-12-24 PROCEDURE — A9270 NON-COVERED ITEM OR SERVICE: HCPCS | Performed by: STUDENT IN AN ORGANIZED HEALTH CARE EDUCATION/TRAINING PROGRAM

## 2023-12-24 RX ADMIN — ACETAMINOPHEN 1000 MG: 500 TABLET ORAL at 04:26

## 2023-12-24 RX ADMIN — SIMETHICONE 125 MG: 125 TABLET, CHEWABLE ORAL at 04:26

## 2023-12-24 RX ADMIN — ONDANSETRON 4 MG: 2 INJECTION INTRAMUSCULAR; INTRAVENOUS at 04:27

## 2023-12-24 RX ADMIN — PANTOPRAZOLE SODIUM 40 MG: 40 INJECTION, POWDER, FOR SOLUTION INTRAVENOUS at 04:27

## 2023-12-24 RX ADMIN — HEPARIN SODIUM 7500 UNITS: 5000 INJECTION, SOLUTION INTRAVENOUS; SUBCUTANEOUS at 04:27

## 2023-12-24 RX ADMIN — DEXAMETHASONE SODIUM PHOSPHATE 4 MG: 4 INJECTION INTRA-ARTICULAR; INTRALESIONAL; INTRAMUSCULAR; INTRAVENOUS; SOFT TISSUE at 04:27

## 2023-12-24 RX ADMIN — GABAPENTIN 300 MG: 300 CAPSULE ORAL at 04:26

## 2023-12-24 RX ADMIN — POLYETHYLENE GLYCOL 3350 1 PACKET: 17 POWDER, FOR SOLUTION ORAL at 04:33

## 2023-12-24 RX ADMIN — METHOCARBAMOL 1000 MG: 500 TABLET ORAL at 04:26

## 2023-12-24 RX ADMIN — KETOROLAC TROMETHAMINE 15 MG: 30 INJECTION, SOLUTION INTRAMUSCULAR; INTRAVENOUS at 04:27

## 2023-12-24 ASSESSMENT — PAIN DESCRIPTION - PAIN TYPE
TYPE: ACUTE PAIN
TYPE: ACUTE PAIN

## 2023-12-24 ASSESSMENT — FIBROSIS 4 INDEX: FIB4 SCORE: 0.34

## 2023-12-24 NOTE — DISCHARGE SUMMARY
Discharge Summary    CHIEF COMPLAINT ON ADMISSION  No chief complaint on file.      Reason for Admission  Morbid obesity (HCC)     Admission Date  12/22/2023    CODE STATUS  Full Code    HPI & HOSPITAL COURSE  This is a 33 y.o. female here POD #2  robotic sleeve gastrectomy and wedge liver biopsy   No notes on file    Therefore, she is discharged in good and stable condition to home with close outpatient follow-up.    The patient met 2-midnight criteria for an inpatient stay at the time of discharge.    Discharge Date  12/24/2023    FOLLOW UP ITEMS POST DISCHARGE      DISCHARGE DIAGNOSES  Active Problems:    * No active hospital problems. *  Resolved Problems:    * No resolved hospital problems. *      FOLLOW UP  Future Appointments   Date Time Provider Department Center   3/1/2024 10:30 AM BREANA Price M.D.  [address] [facility] [address]  [facility] [address]  [phone]    Follow up  See instructions for new address      MEDICATIONS ON DISCHARGE     Medication List      You have not been prescribed any medications.         Allergies  No Known Allergies    DIET  Orders Placed This Encounter   Procedures    Diet Order Diet: Clear Liquid; Miscellaneous modifications: (optional): Bariatric; Tray Modifications (optional): No Straws     Standing Status:   Standing     Number of Occurrences:   1     Order Specific Question:   Diet:     Answer:   Clear Liquid [10]     Order Specific Question:   Miscellaneous modifications: (optional)     Answer:   Bariatric [3]     Order Specific Question:   Tray Modifications (optional)     Answer:   No Straws       ACTIVITY  As tolerated.  20-lb lifting restriction    CONSULTATIONS      PROCEDURES      LABORATORY  Lab Results   Component Value Date    SODIUM 136 12/23/2023    POTASSIUM 4.5 12/23/2023    CHLORIDE 105 12/23/2023    CO2 17 (L) 12/23/2023    GLUCOSE 142 (H) 12/23/2023    BUN 13 12/23/2023    CREATININE 0.52 12/23/2023        Lab  Results   Component Value Date    WBC 14.3 (H) 12/24/2023    HEMOGLOBIN 11.9 (L) 12/24/2023    HEMATOCRIT 37.9 12/24/2023    PLATELETCT 307 12/24/2023        Total time of the discharge process exceeds 30 minutes.

## 2023-12-24 NOTE — CARE PLAN
The patient is Stable - Low risk of patient condition declining or worsening    Shift Goals  Clinical Goals: Pulmonary hygiene  Patient Goals: Rest  Family Goals: update on POC    Progress made toward(s) clinical / shift goals:  Patient on 2L nasal cannula when sleeping. Patient on room air when awake.    Patient is not progressing towards the following goals:

## 2023-12-24 NOTE — PROGRESS NOTES
Bedside report received from parker RN, assumed care at 1900.  Assessment complete.  A&O x 4. Patient calls appropriately.  Patient ambulates with standby assist.   Patient denies pain at this time  Denies N&V. Tolerating bariatric clear liquid diet.  Surgical lap sites x4, dermabond.  + void, - flatus, - BM.  Patient denies SOB. On 2.5L nasal cannula.  Patient calm, pleasant, and cooperative.  Review plan with of care with patient. Call light and personal belongings within reach. Hourly rounding in place. All needs met at this time.

## 2023-12-24 NOTE — PROGRESS NOTES
Bedside report received from night shift nurse. Assumed care at 0645.   Pt A&Ox4  Tolerating bariatric clears diet, denies n/v. Hypoactive bowel sounds, passing flatus, LBM PTA. IV access through 18g RFA that is running LR @ 75 mL/hr.  4x lap sites w/ DB, CDI.   Saturating >90% on RA.  Pt ambulates SBA.  Pain is controlled through medication orders. Updated on plan of care. Safety education provided. Bed locked in low. Call light within reach. Rounding in place.

## 2023-12-25 VITALS
HEIGHT: 55 IN | DIASTOLIC BLOOD PRESSURE: 63 MMHG | WEIGHT: 226.85 LBS | HEART RATE: 47 BPM | SYSTOLIC BLOOD PRESSURE: 141 MMHG | BODY MASS INDEX: 52.5 KG/M2 | RESPIRATION RATE: 18 BRPM | OXYGEN SATURATION: 93 % | TEMPERATURE: 97.7 F

## 2023-12-25 LAB
ALBUMIN SERPL BCP-MCNC: 3.7 G/DL (ref 3.2–4.9)
ALBUMIN/GLOB SERPL: 1.1 G/DL
ALP SERPL-CCNC: 74 U/L (ref 30–99)
ALT SERPL-CCNC: 46 U/L (ref 2–50)
ANION GAP SERPL CALC-SCNC: 13 MMOL/L (ref 7–16)
AST SERPL-CCNC: 32 U/L (ref 12–45)
BASOPHILS # BLD AUTO: 0.2 % (ref 0–1.8)
BASOPHILS # BLD: 0.04 K/UL (ref 0–0.12)
BILIRUB SERPL-MCNC: 0.3 MG/DL (ref 0.1–1.5)
BUN SERPL-MCNC: 17 MG/DL (ref 8–22)
CALCIUM ALBUM COR SERPL-MCNC: 8.7 MG/DL (ref 8.5–10.5)
CALCIUM SERPL-MCNC: 8.5 MG/DL (ref 8.5–10.5)
CHLORIDE SERPL-SCNC: 107 MMOL/L (ref 96–112)
CO2 SERPL-SCNC: 18 MMOL/L (ref 20–33)
CREAT SERPL-MCNC: 0.62 MG/DL (ref 0.5–1.4)
EOSINOPHIL # BLD AUTO: 0.01 K/UL (ref 0–0.51)
EOSINOPHIL NFR BLD: 0.1 % (ref 0–6.9)
ERYTHROCYTE [DISTWIDTH] IN BLOOD BY AUTOMATED COUNT: 50.1 FL (ref 35.9–50)
GFR SERPLBLD CREATININE-BSD FMLA CKD-EPI: 121 ML/MIN/1.73 M 2
GLOBULIN SER CALC-MCNC: 3.4 G/DL (ref 1.9–3.5)
GLUCOSE SERPL-MCNC: 92 MG/DL (ref 65–99)
HCT VFR BLD AUTO: 40.2 % (ref 37–47)
HGB BLD-MCNC: 12.2 G/DL (ref 12–16)
IMM GRANULOCYTES # BLD AUTO: 0.09 K/UL (ref 0–0.11)
IMM GRANULOCYTES NFR BLD AUTO: 0.5 % (ref 0–0.9)
LYMPHOCYTES # BLD AUTO: 4.34 K/UL (ref 1–4.8)
LYMPHOCYTES NFR BLD: 24.8 % (ref 22–41)
MCH RBC QN AUTO: 24.9 PG (ref 27–33)
MCHC RBC AUTO-ENTMCNC: 30.3 G/DL (ref 32.2–35.5)
MCV RBC AUTO: 82.2 FL (ref 81.4–97.8)
MONOCYTES # BLD AUTO: 1.15 K/UL (ref 0–0.85)
MONOCYTES NFR BLD AUTO: 6.6 % (ref 0–13.4)
NEUTROPHILS # BLD AUTO: 11.88 K/UL (ref 1.82–7.42)
NEUTROPHILS NFR BLD: 67.8 % (ref 44–72)
NRBC # BLD AUTO: 0 K/UL
NRBC BLD-RTO: 0 /100 WBC (ref 0–0.2)
PLATELET # BLD AUTO: 328 K/UL (ref 164–446)
PMV BLD AUTO: 10.7 FL (ref 9–12.9)
POTASSIUM SERPL-SCNC: 4.3 MMOL/L (ref 3.6–5.5)
PROT SERPL-MCNC: 7.1 G/DL (ref 6–8.2)
RBC # BLD AUTO: 4.89 M/UL (ref 4.2–5.4)
SODIUM SERPL-SCNC: 138 MMOL/L (ref 135–145)
WBC # BLD AUTO: 17.5 K/UL (ref 4.8–10.8)

## 2023-12-25 PROCEDURE — 700102 HCHG RX REV CODE 250 W/ 637 OVERRIDE(OP): Mod: UD | Performed by: STUDENT IN AN ORGANIZED HEALTH CARE EDUCATION/TRAINING PROGRAM

## 2023-12-25 PROCEDURE — 80053 COMPREHEN METABOLIC PANEL: CPT

## 2023-12-25 PROCEDURE — 700117 HCHG RX CONTRAST REV CODE 255: Mod: UD | Performed by: STUDENT IN AN ORGANIZED HEALTH CARE EDUCATION/TRAINING PROGRAM

## 2023-12-25 PROCEDURE — A9270 NON-COVERED ITEM OR SERVICE: HCPCS | Mod: UD | Performed by: STUDENT IN AN ORGANIZED HEALTH CARE EDUCATION/TRAINING PROGRAM

## 2023-12-25 PROCEDURE — 85025 COMPLETE CBC W/AUTO DIFF WBC: CPT

## 2023-12-25 PROCEDURE — 74177 CT ABD & PELVIS W/CONTRAST: CPT

## 2023-12-25 RX ORDER — ACETAMINOPHEN 325 MG/1
650 TABLET ORAL ONCE
Status: COMPLETED | OUTPATIENT
Start: 2023-12-25 | End: 2023-12-25

## 2023-12-25 RX ADMIN — IOHEXOL 95 ML: 350 INJECTION, SOLUTION INTRAVENOUS at 01:45

## 2023-12-25 RX ADMIN — ACETAMINOPHEN 650 MG: 325 TABLET, FILM COATED ORAL at 00:32

## 2023-12-25 NOTE — ED PROVIDER NOTES
"ED Provider Note    CHIEF COMPLAINT  Chief Complaint   Patient presents with    Post-Op Complications     Pt just discharged today, had gastrectomy on Dec 22nd. C/p pain to one of her incision site by her umbilicus. Small amount of redness noted & some dried serous fluid, not open currently. Surrounding area soft, pt states it feels tender \"on the inside\" by her site. Denies N/V or abdominal pain - sx controlled since d/c.       EXTERNAL RECORDS REVIEWED  Outpatient Notes patient had a robotic sleeve gastrectomy performed by  on 12/22/2023.  No complications noted.  Patient was discharged from the hospital this morning.    HPI/ROS  LIMITATION TO HISTORY   Select: : None      Christi Mcgarry is a 33 y.o. female who presents to the emergency department for evaluation of epigastric fullness and concern for infection at her recent surgical wound sites.  Patient was discharged this morning after a sleeve gastrectomy performed a few days ago.  She states she was feeling well at the time of discharge but became concerned when she noted some slight redness surrounding her incision site to her anterior abdomen as well as intermittent gurgling and pain in her epigastric area.  She denies nausea vomiting diarrhea fevers chills or additional symptoms.  Pain is currently rated mild.    PAST MEDICAL HISTORY   has a past medical history of ADHD (attention deficit hyperactivity disorder), Anxiety, Chickenpox, Depression, Headache (05/25/2016), Insomnia (05/25/2016), Learning disabilities, Morbid obesity (HCC) (05/25/2016), Nocturnal enuresis (05/25/2016), Obesity, DELIA (obstructive sleep apnea) (05/25/2016), Prediabetes (05/25/2016), Sleep apnea, and UTI (lower urinary tract infection) (05/25/2016).    SURGICAL HISTORY   has a past surgical history that includes tonsillectomy and adenoidectomy (4/15/2013).    FAMILY HISTORY  Family History   Problem Relation Age of Onset    Sleep Apnea Neg Hx        SOCIAL " "HISTORY  Social History     Tobacco Use    Smoking status: Never    Smokeless tobacco: Never   Vaping Use    Vaping Use: Never used   Substance and Sexual Activity    Alcohol use: No    Drug use: No     Comment: pt denies    Sexual activity: Not on file       CURRENT MEDICATIONS  Home Medications    **Home medications have not yet been reviewed for this encounter**         ALLERGIES  No Known Allergies    PHYSICAL EXAM  VITAL SIGNS: BP (!) 141/63   Pulse (!) 47   Temp 36.5 °C (97.7 °F) (Temporal)   Resp 18   Ht 1.372 m (4' 6\")   Wt 103 kg (226 lb 13.7 oz)   LMP 11/30/2023 (Approximate)   SpO2 93%   BMI 54.70 kg/m²    Constitutional: No acute distress  HEENT: Atraumatic, normocephalic, pupils are equal round reactive to light, nose normal, mouth shows moist mucous membranes  Neck: Supple, no JVD, no tracheal deviation  Cardiovascular: Regular rate and rhythm, no murmur, rub or gallop, 2+ pulses peripherally x4  Thorax & Lungs: No respiratory distress, no wheezes, rales or rhonchi, no chest wall tenderness.  GI: Soft, minimal abdominal distention, 2 surgical incision sites appear with sutures intact, minimal dried blood overlying with no active bleeding, no purulent discharge, no surrounding erythema.  Very small area of ecchymosis associated with the anterior area.  Minimal epigastric discomfort without specific tenderness or guarding.  Noted no rebound.  Skin: Warm, dry, no acute rash or lesion  Musculoskeletal: Moving all extremities, no acute deformity, no edema, no tenderness  Neurologic: A&Ox3, at baseline mentation, cranial nerves II through XII are grossly intact, no sensory deficit, no ataxia  Psychiatric: Appropriate affect for situation at this time      DIAGNOSTIC STUDIES / PROCEDURES    LABS  Labs Reviewed   CBC WITH DIFFERENTIAL - Abnormal; Notable for the following components:       Result Value    WBC 17.5 (*)     MCH 24.9 (*)     MCHC 30.3 (*)     RDW 50.1 (*)     Neutrophils (Absolute) 11.88 " (*)     Monos (Absolute) 1.15 (*)     All other components within normal limits   COMP METABOLIC PANEL - Abnormal; Notable for the following components:    Co2 18 (*)     All other components within normal limits   ESTIMATED GFR         RADIOLOGY  I have independently interpreted the diagnostic imaging associated with this visit and am waiting the final reading from the radiologist.   My preliminary interpretation is as follows: No evidence of intra-abdominal abscess, bowel obstruction, bowel perforation    Radiologist interpretation:   CT-ABDOMEN-PELVIS WITH   Final Result      1.  No acute abnormality within the abdomen or pelvis      2.  Hepatic steatosis and hepatomegaly      3.  Gastric postoperative changes      4.  Nonobstructive right renal 5 mm calculus            COURSE & MEDICAL DECISION MAKING    ED Observation Status? No; Patient does not meet criteria for ED Observation.     INITIAL ASSESSMENT, COURSE AND PLAN  Care Narrative:     Patient presents emergency department the same day as discharge following a bariatric robotic sleeve gastrectomy performed 2 days ago.  Clinically patient is well-appearing, somewhat hypertensive but otherwise with stable vitals, afebrile and with a quite benign abdominal examination.  Surgical sites appear to be healing very well without evidence of infection.  Suspect pain is secondary to intra-abdominal insufflation from procedure however will rule out procedural complication with CT abdomen pelvis with oral and IV contrast.  Will obtain basic labs.  Will treat symptoms with acetaminophen per patient request.    ED workup remarkable for leukocytosis to 17.5 which I suspect is reactive in the setting of her procedure, no anemia and unremarkable complete metabolic panel with no NARAYAN, electrolyte abnormality.  Acidosis possibly in the setting of dehydration and will p.o. challenge patient.  CT scan with no acute intra-abdominal pathology.    On recheck after patient received  acetaminophen she reports her symptoms have resolved and she is feeling very well.  Repeat abdominal examination unremarkable.  Vitals remained stable.  Patient is tolerating p.o. with no difficulty.  I encouraged her to call her surgical team to schedule a follow-up appointment over the next few days.  Return precautions discussed all questions answered and she was discharged she will condition.      ADDITIONAL PROBLEM LIST  None    DISPOSITION AND DISCUSSIONS  I have discussed management of the patient with the following physicians and ELANA's: None    Discussion of management with other QHP or appropriate source(s): None     FINAL IMPRESSION  1. Post-op pain        PRESCRIPTIONS  There are no discharge medications for this patient.      FOLLOW UP  Serena Clemons M.D.  [address] [facility] [address]  [phone]    Schedule an appointment as soon as possible for a visit       Your surgery team.  Call for follow-up              -DISCHARGE-           Electronically signed by: Zachery Yuan M.D., 12/24/2023 11:57 PM

## 2023-12-25 NOTE — ED TRIAGE NOTES
"Christi Mcgarry  33 y.o.  female  Chief Complaint   Patient presents with    Post-Op Complications     Pt just discharged today, had gastrectomy on Dec 22nd. C/p pain to one of her incision site by her umbilicus. Small amount of redness noted & some dried serous fluid, not open currently. Surrounding area soft, pt states it feels tender \"on the inside\" by her site. Denies N/V or abdominal pain - sx controlled since d/c.     Pt ambulatory into triage with brother & cousin. Patient educated on triage process, to alert staff if any changes in condition.    "

## 2023-12-25 NOTE — DISCHARGE INSTRUCTIONS
Please call your surgical team for a follow-up appointment.    Return to the emergency room with any fever, more severe pain, recurrent vomiting, vomiting blood or if you are otherwise feeling worse.

## 2023-12-26 LAB — PATHOLOGY CONSULT NOTE: NORMAL

## 2024-01-07 ASSESSMENT — PAIN SCALES - GENERAL: PAIN_LEVEL: 0

## 2024-01-08 NOTE — ANESTHESIA POSTPROCEDURE EVALUATION
Patient: Christi Mcgarry    Procedure Summary       Date: 12/22/23 Room / Location: [place] [address] / SURGERY [facility]    Anesthesia Start: 1633 Anesthesia Stop: 1759    Procedures:       ROBOTIC SLEEVE GASTRECTOMY (Abdomen)      BIOPSY, LIVER (Abdomen) Diagnosis: (MORBID OBESITY)    Surgeons: Bill Collins M.D. Responsible Provider: Patrick Gilbert M.D.    Anesthesia Type: general ASA Status: 3            Final Anesthesia Type: general  Last vitals  /80       Temp 98.6   Pulse 90   Resp 16   SpO2 97     Anesthesia Post Evaluation    Patient location during evaluation: PACU  Patient participation: complete - patient participated  Level of consciousness: awake  Pain score: 0    Airway patency: patent  Anesthetic complications: no  Cardiovascular status: adequate  Respiratory status: acceptable  Hydration status: stable    PONV: controlled          No notable events documented.     Nurse Pain Score: 2 (NPRS)          
normal...

## 2024-03-01 ENCOUNTER — OFFICE VISIT (OUTPATIENT)
Dept: INTERNAL MEDICINE | Facility: OTHER | Age: 34
End: 2024-03-01
Payer: MEDICAID

## 2024-03-01 VITALS
SYSTOLIC BLOOD PRESSURE: 109 MMHG | HEART RATE: 60 BPM | DIASTOLIC BLOOD PRESSURE: 66 MMHG | BODY MASS INDEX: 48.1 KG/M2 | HEIGHT: 56 IN | OXYGEN SATURATION: 99 % | WEIGHT: 213.8 LBS | TEMPERATURE: 97.1 F

## 2024-03-01 DIAGNOSIS — G44.89 OTHER HEADACHE SYNDROME: ICD-10-CM

## 2024-03-01 DIAGNOSIS — E66.01 MORBID OBESITY WITH BMI OF 45.0-49.9, ADULT (HCC): ICD-10-CM

## 2024-03-01 DIAGNOSIS — R11.11 VOMITING WITHOUT NAUSEA, UNSPECIFIED VOMITING TYPE: ICD-10-CM

## 2024-03-01 PROBLEM — K21.9 GASTROESOPHAGEAL REFLUX DISEASE WITHOUT ESOPHAGITIS: Status: RESOLVED | Noted: 2017-07-26 | Resolved: 2024-03-01

## 2024-03-01 PROBLEM — R13.10 ODYNOPHAGIA: Status: RESOLVED | Noted: 2018-02-21 | Resolved: 2024-03-01

## 2024-03-01 PROCEDURE — 3078F DIAST BP <80 MM HG: CPT | Mod: GE

## 2024-03-01 PROCEDURE — 3074F SYST BP LT 130 MM HG: CPT | Mod: GE

## 2024-03-01 PROCEDURE — 99213 OFFICE O/P EST LOW 20 MIN: CPT | Mod: GE

## 2024-03-01 PROCEDURE — 1126F AMNT PAIN NOTED NONE PRSNT: CPT | Mod: GE

## 2024-03-01 RX ORDER — MULTIVITAMIN
TABLET ORAL
COMMUNITY
End: 2024-03-27

## 2024-03-01 ASSESSMENT — PATIENT HEALTH QUESTIONNAIRE - PHQ9: CLINICAL INTERPRETATION OF PHQ2 SCORE: 0

## 2024-03-01 ASSESSMENT — FIBROSIS 4 INDEX: FIB4 SCORE: 0.46

## 2024-03-01 ASSESSMENT — PAIN SCALES - GENERAL: PAINLEVEL: NO PAIN

## 2024-03-01 NOTE — PATIENT INSTRUCTIONS
-Eat smaller portions  -If vomiting still persists, please reach put to surgery  -Exercise for at least 30 monutes a day   -It was nice visiting with you tdoay!    -Follow-up in 6 months for annual exam

## 2024-03-02 NOTE — PROGRESS NOTES
Established Patient    Patient Care Team:  Serena Clemons M.D. as PCP - General (Internal Medicine)  Preferred home care  as Respiratory Therapist (DME Supplier)    Christi Mcgarry is a 33 y.o. female who presents today with the following Chief Complaint(s): Follow up for Diagnoses of Vomiting without nausea, unspecified vomiting type, Morbid obesity with BMI of 45.0-49.9, adult (HCC), and Acute nonintractable headache, unspecified headache type were pertinent to this visit.    HPI:  Christi Mcgarry is a 33-year-old female with a past medical history of obesity, prediabetes, ADHD, Down syndrome and DELIA on CPAP who presents to the clinic today with her aunt, Marietta Rhodes for follow-up and acute issue of vomiting.    Was referred to bariatric surgery for morbid obesity and underwent robotic sleeve gastrectomy on 12/22. Is down 13 lbs. Current BMI 48.64. Has followed up twice with surgery and has next appointment in March. Does exercise every other day for 30 minutes. Current diet is strict portion size control, which she has been adhering to with the help of her aunt. Does report resolution of headaches since stopping strict preop diet. Does report vomiting sometimes after eating that has been ongoing since the surgery. Denies any fevers, chills, palpitations, nausea, abdominal pain, hematemesis, constipation or diarrhea. Feels food stuck in her chest. Does report adherence to small portions.     ROS:     General: Weight loss. No fevers, chills, night sweats, weight gain  HEENT: No hearing changes, vision changes, eye pain, ear pain, nasal discharge, sore throat  Neck: No swelling in neck  Pulmonary: No shortness of breath, cough, sputum, or hemoptysis  Cardiovascular: No chest pain, palpitations, or LE swelling  GI: Vomiting. No nausea, diarrhea, constipation, abdominal pain, hematochezia or melena  : No dysuria or frequency  Neuro: No focal weakness, no general weakness, no headaches, no lightheadedness, no  "dizziness  Psych: No anxiety or depression    Past Medical History:   Diagnosis Date    ADHD (attention deficit hyperactivity disorder)     Anxiety     Chickenpox     Depression     Pt lost mother October 2023    Gastroesophageal reflux disease without esophagitis     Headache 05/25/2016    Insomnia 05/25/2016    Learning disabilities     unable read or write    Morbid obesity (HCC) 05/25/2016    Nocturnal enuresis 05/25/2016    Obesity     Odynophagia     DELIA (obstructive sleep apnea) 05/25/2016    Prediabetes 05/25/2016    Sleep apnea     UTI (lower urinary tract infection) 05/25/2016     Social History     Tobacco Use    Smoking status: Never    Smokeless tobacco: Never   Vaping Use    Vaping Use: Never used   Substance Use Topics    Alcohol use: No    Drug use: No     Comment: pt denies     Current Outpatient Medications   Medication Sig Dispense Refill    Multiple Vitamin (MULTI VITAMIN DAILY) Tab Take  by mouth.       No current facility-administered medications for this visit.       Physical Exam:  /66 (BP Location: Right arm, Patient Position: Sitting, BP Cuff Size: Adult)   Pulse 60   Temp 36.2 °C (97.1 °F) (Temporal)   Ht 1.412 m (4' 7.59\")   Wt 97 kg (213 lb 12.8 oz)   SpO2 99%   BMI 48.64 kg/m²   General: Well developed, well nourished female, in no distress.  HEENT: NC/AT, PERRL, EOMI, no scleral icterus or conjunctival pallor  CV:RRR, no murmurs gallops or Rubs  Pulm: LCAB, no crackles, rales, rhonchi, or wheezing  GI: Normal bowel sounds, abdomen soft, nontender, distended (obese) to deep or light palpation in all 4 quadrants, no HSM.  MSK: No lower extremity edema  Neuro: Patient is alert and oriented x3, no focal deficits  Psych: Appropriate mood and affect       Assessment and Plan:   1. Vomiting without nausea, unspecified vomiting type  Likely secondary to eating bigger portions than indicated   - Discussed with patient and her aunt to follow portion sizes as indicated on handouts " from bariatric surgery and can consider decreasing portion size more to see if this helps symptoms  - If no improvement, discussed to contact surgery team    2. Morbid obesity with BMI of 45.0-49.9, adult ([facility])  BMI 48.64  Down 13 lbs  A1c 6.4 (10/30/23)  Lipid panel (5/31/23):   HDL 38 LDL 75   - Patient identified as having weight management issue.  Appropriate orders and counseling given.  - Discussed with patient to continue her strict portion sizes and exercise for at least 30 minutes a day.     3. Other headache syndrome  Resolved   Secondary to post-op restrictive diet  Not cleared to use NSAIDs per surgery.   - Use other supportive measures such as staying hydrated, applying warm or cold compress to the area, massaging the area and drinking small sip of caffeine as needed for headaches.       Return in about 6 months (around 9/1/2024).    Patient Instructions   -Eat smaller portions  -If vomiting still persists, please reach put to surgery  -Exercise for at least 30 monutes a day   -It was nice visiting with you tdoay!    -Follow-up in 6 months for annual exam      Serena Clemons M.D. PGY-3  [facility]    This note was created using voice recognition software.  While every attempt is made to ensure accuracy of transcription, occasionally errors occur.

## 2024-03-27 ENCOUNTER — APPOINTMENT (OUTPATIENT)
Dept: RADIOLOGY | Facility: MEDICAL CENTER | Age: 34
DRG: 418 | End: 2024-03-27
Attending: EMERGENCY MEDICINE
Payer: MEDICAID

## 2024-03-27 ENCOUNTER — HOSPITAL ENCOUNTER (INPATIENT)
Facility: MEDICAL CENTER | Age: 34
LOS: 4 days | DRG: 418 | End: 2024-03-31
Attending: EMERGENCY MEDICINE | Admitting: STUDENT IN AN ORGANIZED HEALTH CARE EDUCATION/TRAINING PROGRAM
Payer: MEDICAID

## 2024-03-27 DIAGNOSIS — R10.11 ABDOMINAL PAIN, ACUTE, RIGHT UPPER QUADRANT: ICD-10-CM

## 2024-03-27 DIAGNOSIS — K83.8 COMMON BILE DUCT DILATATION: ICD-10-CM

## 2024-03-27 DIAGNOSIS — E86.0 DEHYDRATION: ICD-10-CM

## 2024-03-27 DIAGNOSIS — K80.50 BILIARY COLIC: ICD-10-CM

## 2024-03-27 DIAGNOSIS — R74.01 ELEVATED TRANSAMINASE LEVEL: ICD-10-CM

## 2024-03-27 PROBLEM — E87.6 HYPOKALEMIA: Status: ACTIVE | Noted: 2024-03-27

## 2024-03-27 PROBLEM — E66.813 CLASS 3 SEVERE OBESITY DUE TO EXCESS CALORIES WITHOUT SERIOUS COMORBIDITY WITH BODY MASS INDEX (BMI) OF 50.0 TO 59.9 IN ADULT (HCC): Status: ACTIVE | Noted: 2024-03-01

## 2024-03-27 LAB
ALBUMIN SERPL BCP-MCNC: 4.2 G/DL (ref 3.2–4.9)
ALBUMIN/GLOB SERPL: 1.3 G/DL
ALP SERPL-CCNC: 173 U/L (ref 30–99)
ALT SERPL-CCNC: 54 U/L (ref 2–50)
ANION GAP SERPL CALC-SCNC: 13 MMOL/L (ref 7–16)
APPEARANCE UR: CLEAR
AST SERPL-CCNC: 99 U/L (ref 12–45)
BACTERIA #/AREA URNS HPF: NEGATIVE /HPF
BASOPHILS # BLD AUTO: 0.2 % (ref 0–1.8)
BASOPHILS # BLD: 0.04 K/UL (ref 0–0.12)
BILIRUB SERPL-MCNC: 0.4 MG/DL (ref 0.1–1.5)
BILIRUB UR QL STRIP.AUTO: NEGATIVE
BUN SERPL-MCNC: 12 MG/DL (ref 8–22)
CALCIUM ALBUM COR SERPL-MCNC: 8.8 MG/DL (ref 8.5–10.5)
CALCIUM SERPL-MCNC: 9 MG/DL (ref 8.5–10.5)
CHLORIDE SERPL-SCNC: 106 MMOL/L (ref 96–112)
CO2 SERPL-SCNC: 21 MMOL/L (ref 20–33)
COLOR UR: YELLOW
CREAT SERPL-MCNC: 0.43 MG/DL (ref 0.5–1.4)
EOSINOPHIL # BLD AUTO: 0.07 K/UL (ref 0–0.51)
EOSINOPHIL NFR BLD: 0.4 % (ref 0–6.9)
EPI CELLS #/AREA URNS HPF: NORMAL /HPF
ERYTHROCYTE [DISTWIDTH] IN BLOOD BY AUTOMATED COUNT: 46.5 FL (ref 35.9–50)
GFR SERPLBLD CREATININE-BSD FMLA CKD-EPI: 132 ML/MIN/1.73 M 2
GLOBULIN SER CALC-MCNC: 3.2 G/DL (ref 1.9–3.5)
GLUCOSE SERPL-MCNC: 116 MG/DL (ref 65–99)
GLUCOSE UR STRIP.AUTO-MCNC: NEGATIVE MG/DL
HCG SERPL QL: NEGATIVE
HCT VFR BLD AUTO: 44.2 % (ref 37–47)
HGB BLD-MCNC: 13.9 G/DL (ref 12–16)
HYALINE CASTS #/AREA URNS LPF: NORMAL /LPF
IMM GRANULOCYTES # BLD AUTO: 0.13 K/UL (ref 0–0.11)
IMM GRANULOCYTES NFR BLD AUTO: 0.7 % (ref 0–0.9)
KETONES UR STRIP.AUTO-MCNC: ABNORMAL MG/DL
LEUKOCYTE ESTERASE UR QL STRIP.AUTO: NEGATIVE
LIPASE SERPL-CCNC: 19 U/L (ref 11–82)
LYMPHOCYTES # BLD AUTO: 1.84 K/UL (ref 1–4.8)
LYMPHOCYTES NFR BLD: 9.9 % (ref 22–41)
MAGNESIUM SERPL-MCNC: 2.1 MG/DL (ref 1.5–2.5)
MCH RBC QN AUTO: 25.6 PG (ref 27–33)
MCHC RBC AUTO-ENTMCNC: 31.4 G/DL (ref 32.2–35.5)
MCV RBC AUTO: 81.3 FL (ref 81.4–97.8)
MICRO URNS: ABNORMAL
MONOCYTES # BLD AUTO: 0.85 K/UL (ref 0–0.85)
MONOCYTES NFR BLD AUTO: 4.6 % (ref 0–13.4)
NEUTROPHILS # BLD AUTO: 15.69 K/UL (ref 1.82–7.42)
NEUTROPHILS NFR BLD: 84.2 % (ref 44–72)
NITRITE UR QL STRIP.AUTO: NEGATIVE
NRBC # BLD AUTO: 0 K/UL
NRBC BLD-RTO: 0 /100 WBC (ref 0–0.2)
PH UR STRIP.AUTO: 6 [PH] (ref 5–8)
PLATELET # BLD AUTO: 370 K/UL (ref 164–446)
PMV BLD AUTO: 10.8 FL (ref 9–12.9)
POTASSIUM SERPL-SCNC: 3.6 MMOL/L (ref 3.6–5.5)
PROT SERPL-MCNC: 7.4 G/DL (ref 6–8.2)
PROT UR QL STRIP: NEGATIVE MG/DL
RBC # BLD AUTO: 5.44 M/UL (ref 4.2–5.4)
RBC # URNS HPF: NORMAL /HPF
RBC UR QL AUTO: ABNORMAL
SODIUM SERPL-SCNC: 140 MMOL/L (ref 135–145)
SP GR UR STRIP.AUTO: 1.02
UROBILINOGEN UR STRIP.AUTO-MCNC: 1 MG/DL
WBC # BLD AUTO: 18.6 K/UL (ref 4.8–10.8)
WBC #/AREA URNS HPF: NORMAL /HPF

## 2024-03-27 PROCEDURE — 80053 COMPREHEN METABOLIC PANEL: CPT

## 2024-03-27 PROCEDURE — 96375 TX/PRO/DX INJ NEW DRUG ADDON: CPT

## 2024-03-27 PROCEDURE — 700111 HCHG RX REV CODE 636 W/ 250 OVERRIDE (IP): Mod: JZ,UD | Performed by: EMERGENCY MEDICINE

## 2024-03-27 PROCEDURE — 81001 URINALYSIS AUTO W/SCOPE: CPT

## 2024-03-27 PROCEDURE — 76705 ECHO EXAM OF ABDOMEN: CPT

## 2024-03-27 PROCEDURE — 83735 ASSAY OF MAGNESIUM: CPT

## 2024-03-27 PROCEDURE — 770006 HCHG ROOM/CARE - MED/SURG/GYN SEMI*

## 2024-03-27 PROCEDURE — 96374 THER/PROPH/DIAG INJ IV PUSH: CPT

## 2024-03-27 PROCEDURE — 700105 HCHG RX REV CODE 258: Performed by: STUDENT IN AN ORGANIZED HEALTH CARE EDUCATION/TRAINING PROGRAM

## 2024-03-27 PROCEDURE — 36415 COLL VENOUS BLD VENIPUNCTURE: CPT

## 2024-03-27 PROCEDURE — A9270 NON-COVERED ITEM OR SERVICE: HCPCS | Mod: JZ | Performed by: STUDENT IN AN ORGANIZED HEALTH CARE EDUCATION/TRAINING PROGRAM

## 2024-03-27 PROCEDURE — 83690 ASSAY OF LIPASE: CPT

## 2024-03-27 PROCEDURE — 96376 TX/PRO/DX INJ SAME DRUG ADON: CPT

## 2024-03-27 PROCEDURE — 84703 CHORIONIC GONADOTROPIN ASSAY: CPT

## 2024-03-27 PROCEDURE — 700105 HCHG RX REV CODE 258: Mod: UD | Performed by: EMERGENCY MEDICINE

## 2024-03-27 PROCEDURE — 99223 1ST HOSP IP/OBS HIGH 75: CPT | Performed by: STUDENT IN AN ORGANIZED HEALTH CARE EDUCATION/TRAINING PROGRAM

## 2024-03-27 PROCEDURE — 74177 CT ABD & PELVIS W/CONTRAST: CPT

## 2024-03-27 PROCEDURE — 700117 HCHG RX CONTRAST REV CODE 255: Mod: UD | Performed by: EMERGENCY MEDICINE

## 2024-03-27 PROCEDURE — 700102 HCHG RX REV CODE 250 W/ 637 OVERRIDE(OP): Mod: JZ | Performed by: STUDENT IN AN ORGANIZED HEALTH CARE EDUCATION/TRAINING PROGRAM

## 2024-03-27 PROCEDURE — 99285 EMERGENCY DEPT VISIT HI MDM: CPT

## 2024-03-27 PROCEDURE — 85025 COMPLETE CBC W/AUTO DIFF WBC: CPT

## 2024-03-27 RX ORDER — OMEPRAZOLE 20 MG/1
CAPSULE, DELAYED RELEASE ORAL
Status: SHIPPED | COMMUNITY
Start: 2023-12-13 | End: 2024-03-27

## 2024-03-27 RX ORDER — POTASSIUM CHLORIDE 20 MEQ/1
40 TABLET, EXTENDED RELEASE ORAL ONCE
Status: COMPLETED | OUTPATIENT
Start: 2024-03-27 | End: 2024-03-27

## 2024-03-27 RX ORDER — TRAMADOL HYDROCHLORIDE 50 MG/1
50 TABLET ORAL EVERY 4 HOURS PRN
Status: DISCONTINUED | OUTPATIENT
Start: 2024-03-27 | End: 2024-03-31

## 2024-03-27 RX ORDER — LABETALOL HYDROCHLORIDE 5 MG/ML
10 INJECTION, SOLUTION INTRAVENOUS EVERY 4 HOURS PRN
Status: DISCONTINUED | OUTPATIENT
Start: 2024-03-27 | End: 2024-03-31 | Stop reason: HOSPADM

## 2024-03-27 RX ORDER — ONDANSETRON 2 MG/ML
4 INJECTION INTRAMUSCULAR; INTRAVENOUS ONCE
Status: COMPLETED | OUTPATIENT
Start: 2024-03-27 | End: 2024-03-27

## 2024-03-27 RX ORDER — ONDANSETRON 4 MG/1
4 TABLET, ORALLY DISINTEGRATING ORAL EVERY 4 HOURS PRN
Status: DISCONTINUED | OUTPATIENT
Start: 2024-03-27 | End: 2024-03-31 | Stop reason: HOSPADM

## 2024-03-27 RX ORDER — OMEPRAZOLE 40 MG/1
CAPSULE, DELAYED RELEASE ORAL
COMMUNITY
Start: 2024-01-03

## 2024-03-27 RX ORDER — OMEPRAZOLE 20 MG/1
20 CAPSULE, DELAYED RELEASE ORAL DAILY
Status: DISCONTINUED | OUTPATIENT
Start: 2024-03-28 | End: 2024-03-31 | Stop reason: HOSPADM

## 2024-03-27 RX ORDER — ONDANSETRON 2 MG/ML
4 INJECTION INTRAMUSCULAR; INTRAVENOUS EVERY 4 HOURS PRN
Status: DISCONTINUED | OUTPATIENT
Start: 2024-03-27 | End: 2024-03-31 | Stop reason: HOSPADM

## 2024-03-27 RX ORDER — MORPHINE SULFATE 4 MG/ML
4 INJECTION INTRAVENOUS ONCE
Status: COMPLETED | OUTPATIENT
Start: 2024-03-27 | End: 2024-03-27

## 2024-03-27 RX ORDER — IBUPROFEN 600 MG/1
600 TABLET ORAL EVERY 6 HOURS PRN
Status: DISCONTINUED | OUTPATIENT
Start: 2024-03-27 | End: 2024-03-27

## 2024-03-27 RX ORDER — SODIUM CHLORIDE 9 MG/ML
1000 INJECTION, SOLUTION INTRAVENOUS ONCE
Status: COMPLETED | OUTPATIENT
Start: 2024-03-27 | End: 2024-03-27

## 2024-03-27 RX ORDER — SODIUM CHLORIDE 9 MG/ML
INJECTION, SOLUTION INTRAVENOUS CONTINUOUS
Status: DISCONTINUED | OUTPATIENT
Start: 2024-03-27 | End: 2024-03-28

## 2024-03-27 RX ADMIN — FAMOTIDINE 20 MG: 10 INJECTION, SOLUTION INTRAVENOUS at 17:30

## 2024-03-27 RX ADMIN — IOHEXOL 100 ML: 350 INJECTION, SOLUTION INTRAVENOUS at 19:23

## 2024-03-27 RX ADMIN — POTASSIUM CHLORIDE 40 MEQ: 1500 TABLET, EXTENDED RELEASE ORAL at 23:01

## 2024-03-27 RX ADMIN — SODIUM CHLORIDE 1000 ML: 9 INJECTION, SOLUTION INTRAVENOUS at 17:29

## 2024-03-27 RX ADMIN — MORPHINE SULFATE 4 MG: 4 INJECTION, SOLUTION INTRAMUSCULAR; INTRAVENOUS at 22:35

## 2024-03-27 RX ADMIN — ONDANSETRON 4 MG: 2 INJECTION INTRAMUSCULAR; INTRAVENOUS at 17:30

## 2024-03-27 RX ADMIN — SODIUM CHLORIDE: 9 INJECTION, SOLUTION INTRAVENOUS at 23:02

## 2024-03-27 RX ADMIN — MORPHINE SULFATE 4 MG: 4 INJECTION, SOLUTION INTRAMUSCULAR; INTRAVENOUS at 17:30

## 2024-03-27 ASSESSMENT — PATIENT HEALTH QUESTIONNAIRE - PHQ9
SUM OF ALL RESPONSES TO PHQ9 QUESTIONS 1 AND 2: 0
1. LITTLE INTEREST OR PLEASURE IN DOING THINGS: NOT AT ALL
2. FEELING DOWN, DEPRESSED, IRRITABLE, OR HOPELESS: NOT AT ALL

## 2024-03-27 ASSESSMENT — LIFESTYLE VARIABLES
HAVE YOU EVER FELT YOU SHOULD CUT DOWN ON YOUR DRINKING: NO
CONSUMPTION TOTAL: NEGATIVE
ON A TYPICAL DAY WHEN YOU DRINK ALCOHOL HOW MANY DRINKS DO YOU HAVE: 0
TOTAL SCORE: 0
TOTAL SCORE: 0
AVERAGE NUMBER OF DAYS PER WEEK YOU HAVE A DRINK CONTAINING ALCOHOL: 0
EVER HAD A DRINK FIRST THING IN THE MORNING TO STEADY YOUR NERVES TO GET RID OF A HANGOVER: NO
HAVE PEOPLE ANNOYED YOU BY CRITICIZING YOUR DRINKING: NO
HOW MANY TIMES IN THE PAST YEAR HAVE YOU HAD 5 OR MORE DRINKS IN A DAY: 0
TOTAL SCORE: 0
ALCOHOL_USE: NO
DOES PATIENT WANT TO STOP DRINKING: NO
EVER FELT BAD OR GUILTY ABOUT YOUR DRINKING: NO

## 2024-03-27 ASSESSMENT — FIBROSIS 4 INDEX
FIB4 SCORE: 1.17
FIB4 SCORE: 1.17
FIB4 SCORE: 0.46

## 2024-03-27 ASSESSMENT — COGNITIVE AND FUNCTIONAL STATUS - GENERAL
MOBILITY SCORE: 24
SUGGESTED CMS G CODE MODIFIER MOBILITY: CH

## 2024-03-27 ASSESSMENT — ENCOUNTER SYMPTOMS
ABDOMINAL PAIN: 1
COUGH: 0
DIARRHEA: 0
FEVER: 0
SHORTNESS OF BREATH: 0
VOMITING: 0
NAUSEA: 1
CHILLS: 0

## 2024-03-27 ASSESSMENT — PAIN DESCRIPTION - PAIN TYPE: TYPE: ACUTE PAIN

## 2024-03-27 NOTE — ED PROVIDER NOTES
ED Provider Note    CHIEF COMPLAINT  Chief Complaint   Patient presents with    Abdominal Pain     Pt reports mid abdominal pain starting today. Pt reports having a gastric by pass in December.        EXTERNAL RECORDS REVIEWED  Inpatient Notes reviewed discharge summary dated December 24, 2023 by JABARI Hernandez.  Patient admitted for sleeve gastrectomy and liver wedge biopsy.  Doing well, able to discharge the 24th.    HPI/ROS  LIMITATION TO HISTORY   Select: : None  OUTSIDE HISTORIAN(S):  Family notes patient has not been compliant with vitamin and exercise regimen after surgery    Christi Mcgarry is a 33 y.o. female who presents for evaluation of acute abdominal pain.  Patient notes pain started today.  It is localized to the epigastrium without radiation elsewhere.  No diarrhea, no nausea, no vomiting, no fever.  No clear leaving or exacerbating factors.  Pain is pressure/burning in character, currently mild to moderate.  Patient is concerned because she is status post sleeve gastrectomy in December and given her acute pain she came to be assessed.    PAST MEDICAL HISTORY   has a past medical history of ADHD (attention deficit hyperactivity disorder), Anxiety, Chickenpox, Depression, Gastroesophageal reflux disease without esophagitis, Headache (05/25/2016), Insomnia (05/25/2016), Learning disabilities, Morbid obesity (HCC) (05/25/2016), Nocturnal enuresis (05/25/2016), Obesity, Odynophagia, DELIA (obstructive sleep apnea) (05/25/2016), Prediabetes (05/25/2016), Sleep apnea, and UTI (lower urinary tract infection) (05/25/2016).    SURGICAL HISTORY   has a past surgical history that includes tonsillectomy and adenoidectomy (4/15/2013); lap, chris restrict proc, longitudinal gas* (N/A, 12/22/2023); and liver biopsy (N/A, 12/22/2023).    FAMILY HISTORY  Family History   Problem Relation Age of Onset    Sleep Apnea Neg Hx        SOCIAL HISTORY  Social History     Tobacco Use    Smoking status: Never    Smokeless  "tobacco: Never   Vaping Use    Vaping Use: Never used   Substance and Sexual Activity    Alcohol use: No    Drug use: No     Comment: pt denies    Sexual activity: Not on file       CURRENT MEDICATIONS  Home Medications       Reviewed by Shae Wallace (Pharmacy Tech) on 03/27/24 at 2308  Med List Status: Complete     Medication Last Dose Status   Cyanocobalamin (VITAMIN B-12 PO) 3/26/2024 Active   omeprazole (PRILOSEC) 40 MG delayed-release capsule 2 weeks ago Active   POTASSIUM PO 3/26/2024 Active                    ALLERGIES  No Known Allergies    PHYSICAL EXAM  VITAL SIGNS: /59   Pulse 64   Temp 36.3 °C (97.4 °F) (Temporal)   Resp 20   Ht 1.372 m (4' 6\")   Wt 94.4 kg (208 lb 1.8 oz)   SpO2 96%   BMI 50.18 kg/m²    General: Alert, no acute distress  Skin: Warm, dry, normal for ethnicity  Head: Normocephalic, atraumatic  Neck: Trachea midline, no tenderness  Eye: PERRL, normal conjunctiva  ENMT: Oral mucosa pink and dry  Cardiovascular: Regular rate and rhythm, No murmur, Normal peripheral perfusion  Respiratory: Lungs CTA, respirations are non-labored, breath sounds are equal  Gastrointestinal: Soft, periumbilical, epigastric, right upper quadrant tenderness.  Positive Rodriguez sign.  No rebound, no rigidity.  Bowel sounds are hypoactive.  Musculoskeletal: No swelling, no deformity  Neurological: Alert and oriented to person, place, time, and situation  Lymphatics: No lymphadenopathy  Psychiatric: Cooperative, appropriate mood & affect     DIAGNOSTIC STUDIES / PROCEDURES      LABS  Results for orders placed or performed during the hospital encounter of 03/27/24   CBC WITH DIFFERENTIAL   Result Value Ref Range    WBC 18.6 (H) 4.8 - 10.8 K/uL    RBC 5.44 (H) 4.20 - 5.40 M/uL    Hemoglobin 13.9 12.0 - 16.0 g/dL    Hematocrit 44.2 37.0 - 47.0 %    MCV 81.3 (L) 81.4 - 97.8 fL    MCH 25.6 (L) 27.0 - 33.0 pg    MCHC 31.4 (L) 32.2 - 35.5 g/dL    RDW 46.5 35.9 - 50.0 fL    Platelet Count 370 164 - 446 K/uL "    MPV 10.8 9.0 - 12.9 fL    Neutrophils-Polys 84.20 (H) 44.00 - 72.00 %    Lymphocytes 9.90 (L) 22.00 - 41.00 %    Monocytes 4.60 0.00 - 13.40 %    Eosinophils 0.40 0.00 - 6.90 %    Basophils 0.20 0.00 - 1.80 %    Immature Granulocytes 0.70 0.00 - 0.90 %    Nucleated RBC 0.00 0.00 - 0.20 /100 WBC    Neutrophils (Absolute) 15.69 (H) 1.82 - 7.42 K/uL    Lymphs (Absolute) 1.84 1.00 - 4.80 K/uL    Monos (Absolute) 0.85 0.00 - 0.85 K/uL    Eos (Absolute) 0.07 0.00 - 0.51 K/uL    Baso (Absolute) 0.04 0.00 - 0.12 K/uL    Immature Granulocytes (abs) 0.13 (H) 0.00 - 0.11 K/uL    NRBC (Absolute) 0.00 K/uL   COMP METABOLIC PANEL   Result Value Ref Range    Sodium 140 135 - 145 mmol/L    Potassium 3.6 3.6 - 5.5 mmol/L    Chloride 106 96 - 112 mmol/L    Co2 21 20 - 33 mmol/L    Anion Gap 13.0 7.0 - 16.0    Glucose 116 (H) 65 - 99 mg/dL    Bun 12 8 - 22 mg/dL    Creatinine 0.43 (L) 0.50 - 1.40 mg/dL    Calcium 9.0 8.5 - 10.5 mg/dL    Correct Calcium 8.8 8.5 - 10.5 mg/dL    AST(SGOT) 99 (H) 12 - 45 U/L    ALT(SGPT) 54 (H) 2 - 50 U/L    Alkaline Phosphatase 173 (H) 30 - 99 U/L    Total Bilirubin 0.4 0.1 - 1.5 mg/dL    Albumin 4.2 3.2 - 4.9 g/dL    Total Protein 7.4 6.0 - 8.2 g/dL    Globulin 3.2 1.9 - 3.5 g/dL    A-G Ratio 1.3 g/dL   LIPASE   Result Value Ref Range    Lipase 19 11 - 82 U/L   HCG QUAL SERUM   Result Value Ref Range    Beta-Hcg Qualitative Serum Negative Negative   URINALYSIS,CULTURE IF INDICATED    Specimen: Urine   Result Value Ref Range    Color Yellow     Character Clear     Specific Gravity 1.023 <1.035    Ph 6.0 5.0 - 8.0    Glucose Negative Negative mg/dL    Ketones Trace (A) Negative mg/dL    Protein Negative Negative mg/dL    Bilirubin Negative Negative    Urobilinogen, Urine 1.0 Negative    Nitrite Negative Negative    Leukocyte Esterase Negative Negative    Occult Blood Moderate (A) Negative    Micro Urine Req Microscopic    ESTIMATED GFR   Result Value Ref Range    GFR (CKD-EPI) 132 >60 mL/min/1.73 m 2    URINE MICROSCOPIC (W/UA)   Result Value Ref Range    WBC 0-2 /hpf    RBC 0-2 /hpf    Bacteria Negative None /hpf    Epithelial Cells Few /hpf    Hyaline Cast 0-2 /lpf        RADIOLOGY  I have independently interpreted the diagnostic imaging associated with this visit and am waiting the final reading from the radiologist.   My preliminary interpretation is as follows: Large appearing gallbladder noted on CT.  Ultrasound demonstrates gallbladder wall is upper end of normal range at 0.29 cm.  Radiologist interpretation:   US-RUQ   Final Result         1.  Cholelithiasis and gallbladder sludge without additional sonographic findings of acute cholecystitis.   2.  Common bile duct dilatation, consider causes of biliary obstruction with additional workup as clinically appropriate.   3.  Echogenic liver, compatible with fatty change versus fibrosis.      CT-ABDOMEN-PELVIS WITH   Final Result      1.  Question of faint pericholecystic fat stranding. Recommend right upper quadrant ultrasound for further workup.      MR-SECRETIN MRCP-ABDOMEN W/O    (Results Pending)        COURSE & MEDICAL DECISION MAKING    ED Observation Status? Yes; I am placing the patient in to an observation status due to a diagnostic uncertainty as well as therapeutic intensity. Patient placed in observation status at 4:55 PM, 3/27/2024.     Observation plan is as follows: Patient medicated with morphine 4 mg IV, Zofran 4 mg IV, 1 L of normal saline.  Metabolic workup and CT imaging of the abdomen pelvis will be obtained.  Differential diagnosis at this point includes but is not restricted to gastritis, colitis, gastroenteritis, pyelonephritis, cholecystitis    2024: CT demonstrates questionable pericholecystic fat stranding.  This could represent cholecystitis given the patient leukocytosis.  As such I ordered ultrasound at this time.    2028: Patient reassessed, pain improved.  I have updated her with CT results.  Given the question of  "pericholecystic fat stranding certainly cholecystitis must be considered, as such right upper quadrant ultrasound will be obtained.    2232: Patient is still significantly uncomfortable IV morphine.  I have updated her with workup results.  Given gallbladder wall of the upper limits of normal and given leukocytosis with positive Rodriguez sign I am concerned for potential cholecystitis.  As such we will consult general surgery.    2237: I have spoken with Dr. Sotelo of general surgery.  He has reviewed the labs and imaging and feels patient likely has symptomatic cholelithiasis rather than cholecystitis given bladder wall is within normal limits.  Given the patient is recently status post gastric sleeve he recommends consultation with the patient's established surgeon Dr. Collins    2247: I have heard back from Dr. Dean the hospitalist.  He concurs with plan of care thus far accepts patient to his service.  Plan is for HIDA scan to evaluate further.    2301: I spoke with the patient's established surgeon Dr. Collins.  He notes in this presentation certainly early cholecystitis must be considered.  He will consult on the case; concurs with plan for further evaluation including MRCP given the elevated LFTs and dilated common bile duct.  I have updated Dr. Dean the hospitalist.     Patient Vitals for the past 24 hrs:   BP Temp Temp src Pulse Resp SpO2 Height Weight   03/27/24 2302 125/59 -- -- 64 20 96 % -- --   03/27/24 2243 129/75 -- -- 79 20 98 % -- --   03/27/24 2222 (!) 150/67 -- -- 62 20 90 % -- --   03/27/24 2202 107/52 -- -- (!) 55 20 90 % -- --   03/27/24 1849 135/63 -- -- 68 19 95 % -- --   03/27/24 1706 (!) 147/65 -- -- 72 18 97 % -- --   03/27/24 1525 -- -- -- -- -- -- 1.372 m (4' 6\") 94.4 kg (208 lb 1.8 oz)   03/27/24 1508 (!) 140/65 36.3 °C (97.4 °F) Temporal 62 16 99 % -- --        Upon Reevaluation, the patient's condition has: Improved; and will be discharged.    Patient discharged from ED Observation " status at 2301 (Time) 3/27/24 (Date).     INITIAL ASSESSMENT, COURSE AND PLAN  Care Narrative: 33-year-old female presents for evaluation of acute upper abdominal pain.  History and exam as above.  She has a positive Rodriguez sign and significant leukocytosis, abnormal LFTs as well.  I am concerned for cholecystitis.  Ultrasound is nondiagnostic however, her gallbladder wall is at the very upper limits of normal at 0.29 cm.  I consulted with the patient's established surgeon who concurs with plan for admission and will consult on the case.  Hospitalist relates HIDA scan will be obtained as well and MRCP given her dilated common bile duct and abnormal LFTs.  HYDRATION: Based on the patient's presentation of Dehydration the patient was given IV fluids. IV Hydration was used because oral hydration was not as rapid as required. Upon recheck following hydration, the patient was doing better, skin tone improving IV fluids.      ADDITIONAL PROBLEM LIST  Upper abdominal pain, dilated common bile duct, abnormal transaminases and alkaline phosphatase, biliary colic with gallstones and sludge  DISPOSITION AND DISCUSSIONS  I have discussed management of the patient with the following physicians and ELANA's: Consulted with Dr. Dean of [facility] medicine and Dr. Collins the patient's established surgeon, please see delineation of the discussions above.    Discussion of management with other [facility] or appropriate source(s): None     Escalation of care considered, and ultimately not performed:NA    Barriers to care at this time, including but not limited to:  NA .     Decision tools and prescription drugs considered including, but not limited to:  SIRS criteria for sepsis not met .    FINAL DIAGNOSIS  1. Biliary colic    2. Abdominal pain, acute, right upper quadrant    3. Dehydration    4. Elevated transaminase level    5. Common bile duct dilatation           Electronically signed by: Charly Gurrola M.D., 3/27/2024 4:54  PM

## 2024-03-27 NOTE — ED TRIAGE NOTES
"Chief Complaint   Patient presents with    Abdominal Pain     Pt reports mid abdominal pain starting today. Pt reports having a gastric by pass in December.      BP (!) 140/65   Pulse 62   Temp 36.3 °C (97.4 °F) (Temporal)   Resp 16   Ht 1.372 m (4' 6\")   Wt 94.4 kg (208 lb 1.8 oz)   SpO2 99%   BMI 50.18 kg/m²     Pt ambulatory to triage. Pt educated on triage process and thanked for patience.     "

## 2024-03-28 ENCOUNTER — APPOINTMENT (OUTPATIENT)
Dept: RADIOLOGY | Facility: MEDICAL CENTER | Age: 34
DRG: 418 | End: 2024-03-28
Attending: STUDENT IN AN ORGANIZED HEALTH CARE EDUCATION/TRAINING PROGRAM
Payer: MEDICAID

## 2024-03-28 LAB
ANION GAP SERPL CALC-SCNC: 9 MMOL/L (ref 7–16)
BASOPHILS # BLD AUTO: 0.4 % (ref 0–1.8)
BASOPHILS # BLD: 0.04 K/UL (ref 0–0.12)
BUN SERPL-MCNC: 6 MG/DL (ref 8–22)
CALCIUM SERPL-MCNC: 8.4 MG/DL (ref 8.5–10.5)
CHLORIDE SERPL-SCNC: 109 MMOL/L (ref 96–112)
CO2 SERPL-SCNC: 23 MMOL/L (ref 20–33)
CREAT SERPL-MCNC: 0.46 MG/DL (ref 0.5–1.4)
EOSINOPHIL # BLD AUTO: 0.04 K/UL (ref 0–0.51)
EOSINOPHIL NFR BLD: 0.4 % (ref 0–6.9)
ERYTHROCYTE [DISTWIDTH] IN BLOOD BY AUTOMATED COUNT: 47.3 FL (ref 35.9–50)
GFR SERPLBLD CREATININE-BSD FMLA CKD-EPI: 130 ML/MIN/1.73 M 2
GLUCOSE SERPL-MCNC: 122 MG/DL (ref 65–99)
HCT VFR BLD AUTO: 40 % (ref 37–47)
HGB BLD-MCNC: 12.7 G/DL (ref 12–16)
IMM GRANULOCYTES # BLD AUTO: 0.06 K/UL (ref 0–0.11)
IMM GRANULOCYTES NFR BLD AUTO: 0.6 % (ref 0–0.9)
LYMPHOCYTES # BLD AUTO: 1.66 K/UL (ref 1–4.8)
LYMPHOCYTES NFR BLD: 15.7 % (ref 22–41)
MCH RBC QN AUTO: 26 PG (ref 27–33)
MCHC RBC AUTO-ENTMCNC: 31.8 G/DL (ref 32.2–35.5)
MCV RBC AUTO: 82 FL (ref 81.4–97.8)
MONOCYTES # BLD AUTO: 0.5 K/UL (ref 0–0.85)
MONOCYTES NFR BLD AUTO: 4.7 % (ref 0–13.4)
NEUTROPHILS # BLD AUTO: 8.3 K/UL (ref 1.82–7.42)
NEUTROPHILS NFR BLD: 78.2 % (ref 44–72)
NRBC # BLD AUTO: 0 K/UL
NRBC BLD-RTO: 0 /100 WBC (ref 0–0.2)
PLATELET # BLD AUTO: 343 K/UL (ref 164–446)
PMV BLD AUTO: 10.5 FL (ref 9–12.9)
POTASSIUM SERPL-SCNC: 4.3 MMOL/L (ref 3.6–5.5)
RBC # BLD AUTO: 4.88 M/UL (ref 4.2–5.4)
SODIUM SERPL-SCNC: 141 MMOL/L (ref 135–145)
WBC # BLD AUTO: 10.6 K/UL (ref 4.8–10.8)

## 2024-03-28 PROCEDURE — 94660 CPAP INITIATION&MGMT: CPT

## 2024-03-28 PROCEDURE — 36415 COLL VENOUS BLD VENIPUNCTURE: CPT

## 2024-03-28 PROCEDURE — 74181 MRI ABDOMEN W/O CONTRAST: CPT

## 2024-03-28 PROCEDURE — 99232 SBSQ HOSP IP/OBS MODERATE 35: CPT | Performed by: STUDENT IN AN ORGANIZED HEALTH CARE EDUCATION/TRAINING PROGRAM

## 2024-03-28 PROCEDURE — 770006 HCHG ROOM/CARE - MED/SURG/GYN SEMI*

## 2024-03-28 PROCEDURE — 700101 HCHG RX REV CODE 250: Performed by: STUDENT IN AN ORGANIZED HEALTH CARE EDUCATION/TRAINING PROGRAM

## 2024-03-28 PROCEDURE — 700111 HCHG RX REV CODE 636 W/ 250 OVERRIDE (IP): Mod: JZ | Performed by: STUDENT IN AN ORGANIZED HEALTH CARE EDUCATION/TRAINING PROGRAM

## 2024-03-28 PROCEDURE — 700111 HCHG RX REV CODE 636 W/ 250 OVERRIDE (IP): Performed by: STUDENT IN AN ORGANIZED HEALTH CARE EDUCATION/TRAINING PROGRAM

## 2024-03-28 PROCEDURE — A9270 NON-COVERED ITEM OR SERVICE: HCPCS | Performed by: STUDENT IN AN ORGANIZED HEALTH CARE EDUCATION/TRAINING PROGRAM

## 2024-03-28 PROCEDURE — 80048 BASIC METABOLIC PNL TOTAL CA: CPT

## 2024-03-28 PROCEDURE — 85025 COMPLETE CBC W/AUTO DIFF WBC: CPT

## 2024-03-28 PROCEDURE — 700102 HCHG RX REV CODE 250 W/ 637 OVERRIDE(OP): Performed by: STUDENT IN AN ORGANIZED HEALTH CARE EDUCATION/TRAINING PROGRAM

## 2024-03-28 PROCEDURE — 700105 HCHG RX REV CODE 258: Performed by: STUDENT IN AN ORGANIZED HEALTH CARE EDUCATION/TRAINING PROGRAM

## 2024-03-28 RX ORDER — DIAZEPAM 5 MG/ML
5 INJECTION, SOLUTION INTRAMUSCULAR; INTRAVENOUS PRN
Status: COMPLETED | OUTPATIENT
Start: 2024-03-28 | End: 2024-03-28

## 2024-03-28 RX ORDER — SODIUM CHLORIDE, SODIUM LACTATE, POTASSIUM CHLORIDE, CALCIUM CHLORIDE 600; 310; 30; 20 MG/100ML; MG/100ML; MG/100ML; MG/100ML
INJECTION, SOLUTION INTRAVENOUS CONTINUOUS
Status: DISCONTINUED | OUTPATIENT
Start: 2024-03-28 | End: 2024-03-29

## 2024-03-28 RX ORDER — INDOCYANINE GREEN AND WATER 25 MG
25 KIT INJECTION ONCE
Status: DISCONTINUED | OUTPATIENT
Start: 2024-03-28 | End: 2024-03-28

## 2024-03-28 RX ORDER — METRONIDAZOLE 500 MG/100ML
500 INJECTION, SOLUTION INTRAVENOUS EVERY 12 HOURS
Status: DISCONTINUED | OUTPATIENT
Start: 2024-03-28 | End: 2024-03-31

## 2024-03-28 RX ADMIN — OMEPRAZOLE 20 MG: 20 CAPSULE, DELAYED RELEASE ORAL at 05:21

## 2024-03-28 RX ADMIN — SODIUM CHLORIDE, POTASSIUM CHLORIDE, SODIUM LACTATE AND CALCIUM CHLORIDE: 600; 310; 30; 20 INJECTION, SOLUTION INTRAVENOUS at 08:50

## 2024-03-28 RX ADMIN — CEFTRIAXONE SODIUM 1000 MG: 10 INJECTION, POWDER, FOR SOLUTION INTRAVENOUS at 14:22

## 2024-03-28 RX ADMIN — METRONIDAZOLE 500 MG: 5 INJECTION, SOLUTION INTRAVENOUS at 17:31

## 2024-03-28 RX ADMIN — TRAMADOL HYDROCHLORIDE 50 MG: 50 TABLET ORAL at 09:18

## 2024-03-28 RX ADMIN — DIAZEPAM 5 MG: 5 INJECTION, SOLUTION INTRAMUSCULAR; INTRAVENOUS at 14:41

## 2024-03-28 RX ADMIN — ONDANSETRON 4 MG: 2 INJECTION INTRAMUSCULAR; INTRAVENOUS at 00:00

## 2024-03-28 ASSESSMENT — ENCOUNTER SYMPTOMS
CONSTITUTIONAL NEGATIVE: 1
RESPIRATORY NEGATIVE: 1
CARDIOVASCULAR NEGATIVE: 1
MUSCULOSKELETAL NEGATIVE: 1
PSYCHIATRIC NEGATIVE: 1
NEUROLOGICAL NEGATIVE: 1
EYES NEGATIVE: 1
GASTROINTESTINAL NEGATIVE: 1

## 2024-03-28 ASSESSMENT — COGNITIVE AND FUNCTIONAL STATUS - GENERAL
MOBILITY SCORE: 24
DAILY ACTIVITIY SCORE: 24
SUGGESTED CMS G CODE MODIFIER DAILY ACTIVITY: CH
SUGGESTED CMS G CODE MODIFIER MOBILITY: CH

## 2024-03-28 ASSESSMENT — PAIN DESCRIPTION - PAIN TYPE: TYPE: ACUTE PAIN

## 2024-03-28 NOTE — PROGRESS NOTES
Patient arrived from er via stretcher. Ambulated to bed without incident. Sister at bedside. IVF Running per order. PT has no c/o pain at this time however she was feeling nauseas, medicated per order. Skin check done with ZHANNA Au and is clean dry and intact in all areas. Patient and family educated on NPO status  and demonstrate clear understanding of care. Oriented to room, call bell in place, bed locked, care continued.

## 2024-03-28 NOTE — PROGRESS NOTES
Hospital Medicine Daily Progress Note    Date of Service  3/28/2024    Chief Complaint  Christi Mcgarry is a 33 y.o. female admitted 3/27/2024 with abd pain    Hospital Course  33 y.o. female who presented 3/27/2024 with abd pain.  PMH of Down syndrome, DELIA on CPAP, s/p gastric sleeve 12/2023 by Dr. Collins.  Comes in complaining of right upper quadrant abdominal pain that began while she was at work after drinking orange juice.  Pain has persisted and is not improving, associated nausea but no vomiting or bowel changes.  Denies fever/chills, no similar episodes in the past.     In the ED afebrile, hemodynamically stable.  Labs showed WBC count of 18, elevated LFTs, potassium 3.6.    Interval Problem Update  ABD pain better today  Stable vitals  On room air  Leukocytosis resolved  Keep n.p.o. per surgery team  IV hydration  IV ceftriaxone/metronidazole  Pending MRCP  General surgery following, aim for OR on AM  Labs on AM    I have discussed this patient's plan of care and discharge plan at IDT rounds today with Case Management, Nursing, Nursing leadership, and other members of the IDT team.    Consultants/Specialty  Surgery     Code Status  Full Code    Disposition  The patient is not medically cleared for discharge to home or a post-acute facility.  Anticipate discharge to: home with close outpatient follow-up    I have placed the appropriate orders for post-discharge needs.    Review of Systems  Review of Systems   Constitutional: Negative.    HENT: Negative.     Eyes: Negative.    Respiratory: Negative.     Cardiovascular: Negative.    Gastrointestinal: Negative.    Genitourinary: Negative.    Musculoskeletal: Negative.    Skin: Negative.    Neurological: Negative.    Endo/Heme/Allergies: Negative.    Psychiatric/Behavioral: Negative.          Physical Exam  Temp:  [36.2 °C (97.2 °F)-36.6 °C (97.8 °F)] 36.3 °C (97.4 °F)  Pulse:  [55-79] 72  Resp:  [16-20] 20  BP: (105-150)/(44-87) 105/44  SpO2:  [90 %-99  %] 97 %    Physical Exam  Constitutional:       Appearance: Normal appearance.   HENT:      Head: Normocephalic and atraumatic.      Mouth/Throat:      Mouth: Mucous membranes are moist.   Eyes:      Extraocular Movements: Extraocular movements intact.      Pupils: Pupils are equal, round, and reactive to light.   Cardiovascular:      Rate and Rhythm: Normal rate and regular rhythm.      Pulses: Normal pulses.      Heart sounds: Normal heart sounds.   Pulmonary:      Effort: Pulmonary effort is normal.      Breath sounds: Normal breath sounds.   Abdominal:      General: Bowel sounds are normal.      Palpations: Abdomen is soft.      Tenderness: There is abdominal tenderness.   Musculoskeletal:         General: No swelling. Normal range of motion.      Cervical back: Normal range of motion and neck supple.   Skin:     General: Skin is warm.      Coloration: Skin is not jaundiced.   Neurological:      General: No focal deficit present.      Mental Status: She is alert and oriented to person, place, and time. Mental status is at baseline.      Cranial Nerves: No cranial nerve deficit.   Psychiatric:         Mood and Affect: Mood normal.         Behavior: Behavior normal.         Thought Content: Thought content normal.         Judgment: Judgment normal.         Fluids    Intake/Output Summary (Last 24 hours) at 3/28/2024 1213  Last data filed at 3/28/2024 0850  Gross per 24 hour   Intake 1240 ml   Output --   Net 1240 ml       Laboratory  Recent Labs     03/27/24  1538 03/28/24  0331   WBC 18.6* 10.6   RBC 5.44* 4.88   HEMOGLOBIN 13.9 12.7   HEMATOCRIT 44.2 40.0   MCV 81.3* 82.0   MCH 25.6* 26.0*   MCHC 31.4* 31.8*   RDW 46.5 47.3   PLATELETCT 370 343   MPV 10.8 10.5     Recent Labs     03/27/24  1538 03/28/24  0331   SODIUM 140 141   POTASSIUM 3.6 4.3   CHLORIDE 106 109   CO2 21 23   GLUCOSE 116* 122*   BUN 12 6*   CREATININE 0.43* 0.46*   CALCIUM 9.0 8.4*                   Imaging  US-RUQ   Final Result         1.   Cholelithiasis and gallbladder sludge without additional sonographic findings of acute cholecystitis.   2.  Common bile duct dilatation, consider causes of biliary obstruction with additional workup as clinically appropriate.   3.  Echogenic liver, compatible with fatty change versus fibrosis.      CT-ABDOMEN-PELVIS WITH   Final Result      1.  Question of faint pericholecystic fat stranding. Recommend right upper quadrant ultrasound for further workup.      MR-SECRETIN MRCP-ABDOMEN W/O    (Results Pending)        Assessment/Plan  * Right upper quadrant abdominal pain- (present on admission)  Assessment & Plan  Right upper quadrant abdominal pain that began after drinking juice earlier today, no similar episodes in the past  Rodriguez sign positive on exam  Labs showed elevated LFTs and leukocytosis.  No fever/chills  CT scan of the abdomen reviewed and shows possible pericholecystic fat stranding  Ultrasound reviewed and shows gallbladder stones and sludge but no obvious inflammation.  She does have dilated CBD  EDP discussed with patient's surgeon, Dr. Collins recommends MRCP   n.p.o., IV fluids, pain management, MRCP ordered      Hypokalemia- (present on admission)  Assessment & Plan  Potassium 3.6 on presentation.  Replete as needed check magnesium levels.  BMP ordered continue monitoring    Class 3 severe obesity due to excess calories without serious comorbidity with body mass index (BMI) of 50.0 to 59.9 in adult (HCC)- (present on admission)  Assessment & Plan  BMI 50.18.  Recent gastric sleeve    Obstructive sleep apnea syndrome- (present on admission)  Assessment & Plan  Nocturnal CPAP         VTE prophylaxis: SCDs, Aim for OR on AM    I have performed a physical exam and reviewed and updated ROS and Plan today (3/28/2024). In review of yesterday's note (3/27/2024), there are no changes except as documented above.

## 2024-03-28 NOTE — PROGRESS NOTES
MRI NURSING NOTES:    Clarified c Hospitalist Melissa KANG, change MRCP - secretin to MRCP  only.  Order changed.  Bedside RN, Elisabet, updated.    MRI tech, Ludivina, updated to please do pt's MR today. She stated she will try her best.  Pt only for [facility]ina scanner due to ht/wt restrictions.

## 2024-03-28 NOTE — H&P
Hospital Medicine History & Physical Note    Date of Service  3/27/2024    Primary Care Physician  Serena Clemons M.D.    Consultants  general surgery    Specialist Names: Dr Collins    Code Status  Full Code    Chief Complaint  Chief Complaint   Patient presents with    Abdominal Pain     Pt reports mid abdominal pain starting today. Pt reports having a gastric by pass in December.        History of Presenting Illness  Christi Mcgarry is a 33 y.o. female who presented 3/27/2024 with abd pain.  PMH of Down syndrome, DELIA on CPAP, s/p gastric sleeve 12/2023 by Dr. Collins.  Comes in complaining of right upper quadrant abdominal pain that began while she was at work after drinking orange juice.  Pain has persisted and is not improving, associated nausea but no vomiting or bowel changes.  Denies fever/chills, no similar episodes in the past.    In the ED afebrile, hemodynamically stable.  Labs showed WBC count of 18, elevated LFTs, potassium 3.6.    I discussed the plan of care with patient, family, bedside RN, and edp.    Review of Systems  Review of Systems   Constitutional:  Negative for chills and fever.   Respiratory:  Negative for cough and shortness of breath.    Cardiovascular:  Negative for chest pain.   Gastrointestinal:  Positive for abdominal pain and nausea. Negative for diarrhea and vomiting.   Genitourinary:  Negative for dysuria and urgency.       Past Medical History   has a past medical history of ADHD (attention deficit hyperactivity disorder), Anxiety, Chickenpox, Depression, Gastroesophageal reflux disease without esophagitis, Headache (05/25/2016), Insomnia (05/25/2016), Learning disabilities, Morbid obesity (HCC) (05/25/2016), Nocturnal enuresis (05/25/2016), Obesity, Odynophagia, DELIA (obstructive sleep apnea) (05/25/2016), Prediabetes (05/25/2016), Sleep apnea, and UTI (lower urinary tract infection) (05/25/2016).    Surgical History   has a past surgical history that includes  tonsillectomy and adenoidectomy (4/15/2013); pr lap, chris restrict proc, longitudinal gas* (N/A, 12/22/2023); and liver biopsy (N/A, 12/22/2023).     Family History  Family history reviewed with patient. There is no family history that is pertinent to the chief complaint.     Social History   reports that she has never smoked. She has never used smokeless tobacco. She reports that she does not drink alcohol and does not use drugs.    Allergies  No Known Allergies    Medications  Prior to Admission Medications   Prescriptions Last Dose Informant Patient Reported? Taking?   Multiple Vitamin (MULTI VITAMIN DAILY) Tab   Yes No   Sig: Take  by mouth.      Facility-Administered Medications: None       Physical Exam  Temp:  [36.3 °C (97.4 °F)] 36.3 °C (97.4 °F)  Pulse:  [55-79] 64  Resp:  [16-20] 20  BP: (107-150)/(52-75) 125/59  SpO2:  [90 %-99 %] 96 %  Blood Pressure: (!) 150/67   Temperature: 36.3 °C (97.4 °F)   Pulse: 62   Respiration: 20   Pulse Oximetry: 90 %       Physical Exam  Constitutional:       Appearance: Normal appearance.   HENT:      Head: Normocephalic and atraumatic.      Mouth/Throat:      Mouth: Mucous membranes are moist.      Pharynx: No oropharyngeal exudate or posterior oropharyngeal erythema.   Cardiovascular:      Rate and Rhythm: Normal rate and regular rhythm.      Pulses: Normal pulses.      Heart sounds: Normal heart sounds. No murmur heard.  Pulmonary:      Effort: Pulmonary effort is normal. No respiratory distress.      Breath sounds: Normal breath sounds.   Abdominal:      Tenderness: There is abdominal tenderness.      Comments: Rodriguez's positive   Musculoskeletal:         General: No swelling or tenderness. Normal range of motion.   Skin:     General: Skin is warm and dry.   Neurological:      General: No focal deficit present.      Mental Status: She is alert and oriented to person, place, and time.   Psychiatric:         Mood and Affect: Mood normal.         Laboratory:  Recent Labs  "    03/27/24  1538   WBC 18.6*   RBC 5.44*   HEMOGLOBIN 13.9   HEMATOCRIT 44.2   MCV 81.3*   MCH 25.6*   MCHC 31.4*   RDW 46.5   PLATELETCT 370   MPV 10.8     Recent Labs     03/27/24  1538   SODIUM 140   POTASSIUM 3.6   CHLORIDE 106   CO2 21   GLUCOSE 116*   BUN 12   CREATININE 0.43*   CALCIUM 9.0     Recent Labs     03/27/24  1538   ALTSGPT 54*   ASTSGOT 99*   ALKPHOSPHAT 173*   TBILIRUBIN 0.4   LIPASE 19   GLUCOSE 116*         No results for input(s): \"NTPROBNP\" in the last 72 hours.      No results for input(s): \"TROPONINT\" in the last 72 hours.    Imaging:  US-RUQ   Final Result         1.  Cholelithiasis and gallbladder sludge without additional sonographic findings of acute cholecystitis.   2.  Common bile duct dilatation, consider causes of biliary obstruction with additional workup as clinically appropriate.   3.  Echogenic liver, compatible with fatty change versus fibrosis.      CT-ABDOMEN-PELVIS WITH   Final Result      1.  Question of faint pericholecystic fat stranding. Recommend right upper quadrant ultrasound for further workup.      MR-SECRETIN MRCP-ABDOMEN W/O    (Results Pending)     Assessment/Plan:  Justification for Admission Status  I anticipate this patient will require at least two midnights for appropriate medical management, necessitating inpatient admission because abd pain, needs mrcp, ? Need for surgery    * Right upper quadrant abdominal pain- (present on admission)  Assessment & Plan  Right upper quadrant abdominal pain that began after drinking juice earlier today, no similar episodes in the past  Rodriguez sign positive on exam  Labs showed elevated LFTs and leukocytosis.  No fever/chills  CT scan of the abdomen reviewed and shows possible pericholecystic fat stranding  Ultrasound reviewed and shows gallbladder stones and sludge but no obvious inflammation.  She does have dilated CBD  EDP discussed with patient's surgeon, Dr. Collins recommends MRCP   n.p.o., IV fluids, pain " management, MRCP ordered      Hypokalemia- (present on admission)  Assessment & Plan  Potassium 3.6 on presentation.  Replete as needed check magnesium levels.  BMP ordered continue monitoring    Class 3 severe obesity due to excess calories without serious comorbidity with body mass index (BMI) of 50.0 to 59.9 in adult (HCC)- (present on admission)  Assessment & Plan  BMI 50.18.  Recent gastric sleeve    Obstructive sleep apnea syndrome- (present on admission)  Assessment & Plan  Nocturnal CPAP        VTE prophylaxis: SCDs/TEDs

## 2024-03-28 NOTE — ED NOTES
Medication history reviewed with patient and patients family member at bedside.  Med rec is complete  Allergies reviewed.   Patient has not had any outpatient antibiotics in the last 30 days.   Anticoagulants: No    Shae Wallace

## 2024-03-28 NOTE — CONSULTS
BARIATRIC AND GASTROESOPHAGEAL SURGERY  [facility]  CONSULT NOTE    Date  3/28/2024    Primary Care Physician  Serena Clemons M.D.    CC  Epigastric / RUQ abdominal pain, associated with nausea    HPI  This is a 33 y.o. female, with PMH of Down syndrome, DELIA on CPAP and morbid obesity, who is ~ 4 months s/p robotic sleeve gastrectomy, and now presenting with epigastric / RUQ abdominal pain, a/w nausea (but no vomiting) for ~ 12 hours. +flatus and bowel movements. Otherwise, no recent history of fevers/chills, jaundice/icterus, hematemesis, dysphagia/odynophagia, CP/SOB, dysuria/hematuria, BRBPR/melena, or constipation/diarrhea. No prior history of similar symptoms in the recent past.    In the ED, she was hemodynamically stable and was found to have leukocytosis (WBC of 18) and radiologic evidence of cholelithiasis and possible cholecystitis, with mild dilation of CBD. She was admitted by the Hospitalist service overnight for further evaluation/management. Today, patient reports feeling mild amount of abdominal pain (in the epigastrium, mostly) but no other associated symptoms.    Past Medical History:   Diagnosis Date    ADHD (attention deficit hyperactivity disorder)     Anxiety     Chickenpox     Depression     Pt lost mother October 2023    Gastroesophageal reflux disease without esophagitis     Headache 05/25/2016    Insomnia 05/25/2016    Learning disabilities     unable read or write    Morbid obesity (HCC) 05/25/2016    Nocturnal enuresis 05/25/2016    Obesity     Odynophagia     DELIA (obstructive sleep apnea) 05/25/2016    Prediabetes 05/25/2016    Sleep apnea     UTI (lower urinary tract infection) 05/25/2016       Past Surgical History:   Procedure Laterality Date    NE LAP, DAVID RESTRICT PROC, LONGITUDINAL GAS* N/A 12/22/2023    Procedure: ROBOTIC SLEEVE GASTRECTOMY;  Surgeon: Bill Collins M.D.;  Location: SURGERY [facility];  Service: Gen Robotic    LIVER BIOPSY N/A 12/22/2023     Procedure: BIOPSY, LIVER;  Surgeon: Bill Collins M.D.;  Location: SURGERY [facility];  Service: Gen Robotic    TONSILLECTOMY AND ADENOIDECTOMY  4/15/2013    Performed by Nadeem Briceno M.D. at SURGERY SAME DAY [facility]       Current Facility-Administered Medications   Medication Dose Route Frequency Provider Last Rate Last Admin    NS infusion   Intravenous Continuous Carter Dean M.D. 83 mL/hr at 03/27/24 2302 New Bag at 03/27/24 2302    labetalol (Normodyne/Trandate) injection 10 mg  10 mg Intravenous Q4HRS PRN Carter Dean M.D.        omeprazole (PriLOSEC) capsule 20 mg  20 mg Oral DAILY Carter Dean M.D.   20 mg at 03/28/24 0521    traMADol (Ultram) 50 MG tablet 50 mg  50 mg Oral Q4HRS PRN Carter Dean M.D.        ondansetron (Zofran) syringe/vial injection 4 mg  4 mg Intravenous Q4HRS PRN Carter Dean M.D.   4 mg at 03/28/24 0000    ondansetron (Zofran ODT) dispertab 4 mg  4 mg Oral Q4HRS PRN Carter Dean M.D.           Social History     Socioeconomic History    Marital status: Single     Spouse name: Not on file    Number of children: Not on file    Years of education: Not on file    Highest education level: Not on file   Occupational History    Not on file   Tobacco Use    Smoking status: Never    Smokeless tobacco: Never   Vaping Use    Vaping Use: Never used   Substance and Sexual Activity    Alcohol use: No    Drug use: No     Comment: pt denies    Sexual activity: Not on file   Other Topics Concern    Not on file   Social History Narrative    Not on file     Social Determinants of Health     Financial Resource Strain: Not on file   Food Insecurity: Not on file   Transportation Needs: Not on file   Physical Activity: Not on file   Stress: Not on file   Social Connections: Not on file   Intimate Partner Violence: Not on file   Housing Stability: Not on file       Family History   Problem Relation Age of Onset    Sleep Apnea Neg Hx        Allergies  Patient has no known  allergies.    Review of Systems  Negative except for what's noted in the HPI/PMH.    Vital Signs  Blood Pressure: 116/59   Temperature: 36.2 °C (97.2 °F)   Pulse: 63   Respiration: 18   Pulse Oximetry: 93 %     Gen - comfortable, NAD, A&O x 3  HEENT - anicteric, PERRLA, no tracheal deviation  CV - regular rate and rhythm  Pulm - normal chest excursion, CTA - B  Abd - soft, min TTP @ epigastrium, no rebound/guarding, no Rodriguez's sign, no distention, no palp masses or bulges  Inc - all lap incisions are C/D/I - no evidence of infection or bulges; healed well  Ext - full range of motion, no clubbing, cyanosis or edema bilaterally  Neuro - no focal deficits, CN II-XII are grossly intact  Skin - no rashes/lesions, no open wounds, no jaundice    Labs:  Recent Labs     03/27/24  1538 03/28/24  0331   WBC 18.6* 10.6   RBC 5.44* 4.88   HEMOGLOBIN 13.9 12.7   HEMATOCRIT 44.2 40.0   MCV 81.3* 82.0   MCH 25.6* 26.0*   MCHC 31.4* 31.8*   RDW 46.5 47.3   PLATELETCT 370 343   MPV 10.8 10.5     Recent Labs     03/27/24  1538 03/28/24  0331   SODIUM 140 141   POTASSIUM 3.6 4.3   CHLORIDE 106 109   CO2 21 23   GLUCOSE 116* 122*   BUN 12 6*   CREATININE 0.43* 0.46*   CALCIUM 9.0 8.4*         Recent Labs     03/27/24  1538   ASTSGOT 99*   ALTSGPT 54*   TBILIRUBIN 0.4   ALKPHOSPHAT 173*   GLOBULIN 3.2       Radiology:  US-RUQ   Final Result         1.  Cholelithiasis and gallbladder sludge without additional sonographic findings of acute cholecystitis.   2.  Common bile duct dilatation, consider causes of biliary obstruction with additional workup as clinically appropriate.   3.  Echogenic liver, compatible with fatty change versus fibrosis.      CT-ABDOMEN-PELVIS WITH   Final Result      1.  Question of faint pericholecystic fat stranding. Recommend right upper quadrant ultrasound for further workup.      MR-SECRETIN MRCP-ABDOMEN W/O    (Results Pending)       Assessment/Plan:  Biliary colic vs acute cholecystitis in the setting of  gallstones, s/p bariatric surgery 4 months ago. Possible CBD dilation (transient choledocholithiasis ?), however, T. Bili is normal and Alk Phos is only slightly elevated (evidence of fatty liver disease on imaging and labs, as well).    Appreciate management by our Hospitalist team. Agree with diagnostic MRCP to evaluate the biliary system in the preop setting. Will schedule patient for a laparoscopic (vs robotic) cholecystectomy asap. In the meanwhile, please keep patient NPO x meds, on IVF, on Broad spectrum Abx IV, and with adequate pain/nausea control.    Will continue to follow closely.    Thank you for giving us this opportunity to care for this patient.    Bill Collins MD MPH [facility]  Bariatric and Gastroesophageal Surgery  [facility]  Clinical Assistant Professor of Surgery and Physiology & Cell Biology  [facility] [place] [facility]

## 2024-03-28 NOTE — CARE PLAN
The patient is Stable - Low risk of patient condition declining or worsening        Problem: Pain - Standard  Goal: Alleviation of pain or a reduction in pain to the patient’s comfort goal  Outcome: Progressing     Problem: Knowledge Deficit - Standard  Goal: Patient and family/care givers will demonstrate understanding of plan of care, disease process/condition, diagnostic tests and medications  Outcome: Progressing     Shift Goals  Clinical Goals: npo  Patient Goals: rest    Progress made toward(s) clinical / shift goals:    Pt alert and oriented. VSS. On room air. C/O abdominal pain during shift, medication given, pain improved after medication. MRI scheduled today. Still NPO per surgery. Pt ambulatory, tolerated, reinforced education on fall precaution call light within reach. Family at bedside, updated POC.

## 2024-03-28 NOTE — ED NOTES
After pain medication pt is resting comfortably in bed. Says that pain is improved. Sister present in room.

## 2024-03-28 NOTE — ASSESSMENT & PLAN NOTE
Right upper quadrant abdominal pain that began after drinking juice earlier today, no similar episodes in the past  Rodriguez sign positive on exam  Labs showed elevated LFTs and leukocytosis.  No fever/chills  CT scan of the abdomen reviewed and shows possible pericholecystic fat stranding  Ultrasound reviewed and shows gallbladder stones and sludge but no obvious inflammation.  She does have dilated CBD  EDP discussed with patient's surgeon, Dr. Collins recommends MRCP   n.p.o., IV fluids, pain management, MRCP ordered

## 2024-03-28 NOTE — DISCHARGE PLANNING
1525: RN CM provided patient and family at bedside with letter confirming patient's admission and REJI for patient and family to complete. Sister states they have to alert the state when she is hospitalized due to her disability. RN CM alerted family that we are unable to release medical records without REJI.   There is no guardianship or DPOA paperwork.    [FreeTextEntry1] : 36 yo  with complaints of leaking of urine and questionable prolapse. Started after the birth of her son 4 years ago. Over the last year the leaking has gotten worse. She leaks with cough, laugh. She also leaks with strong urge. She feels she empties fully. Denies daytime frequency. Gets up 0-1 time at night. Wears pads. Her leaking is worse is around her period. She does feel a vaginal bulge more so around her menstrual period. No bowel complaints. I reviewed her U/S that showed small cyst. She is supposed to followup with GYN.

## 2024-03-28 NOTE — ASSESSMENT & PLAN NOTE
Potassium 3.6 on presentation.  Replete as needed check magnesium levels.  BMP ordered continue monitoring

## 2024-03-28 NOTE — PROGRESS NOTES
MRI NURSING NOTES:    Per bedside RN @ 1039 today, MRCP - Secretin was canceled by provider.  Then received a call from same RN asking when MRI will be done today.      Luz Marina/pharm inventory unavailable today to order Secretin as we need at least 1 business day to order.  LM for Brittany/pharm inventory in case they happen to have a Secretin on standby.  Awaiting reply.

## 2024-03-28 NOTE — ED NOTES
Bedside report received from off going RN Dayan, assumed care of patient.  POC discussed with patient. Call light within reach, all needs addressed at this time.       Fall risk interventions in place: Move the patient closer to the nurse's station, Patient's personal possessions are with in their safe reach, Place socks on patient, Place fall risk sign on patient's door, Give patient urinal if applicable, Keep floor surfaces clean and dry, and Accompanied to restroom (all applicable per [place] Fall risk assessment)   Continuous monitoring: Cardiac Leads, Pulse Ox, or Blood Pressure  IVF/IV medications: Not Applicable  G20 Right AC  Oxygen: Room Air  Bedside sitter: Not Applicable   Isolation: Not Applicable

## 2024-03-28 NOTE — PROGRESS NOTES
4 Eyes Skin Assessment Completed by mateus Tanner, ZHANNA and Angely Boo RN.    Head WDL  Ears WDL  Nose WDL  Mouth WDL  Neck WDL  Breast/Chest WDL  Shoulder Blades WDL  Spine WDL  (R) Arm/Elbow/Hand WDL  (L) Arm/Elbow/Hand WDL  Abdomen Scar  Groin WDL  Scrotum/Coccyx/Buttocks WDL  (R) Leg WDL  (L) Leg WDL  (R) Heel/Foot/Toe WDL  (L) Heel/Foot/Toe WDL

## 2024-03-28 NOTE — CARE PLAN
The patient is Stable - Low risk of patient condition declining or worsening    Shift Goals  Clinical Goals: npo  Patient Goals: rest    Progress made toward(s) clinical / shift goals:      Patients pain will be managed during stay    Patient is not progressing towards the following goals:

## 2024-03-28 NOTE — PROGRESS NOTES
Pt has an order for Indocyanine green, Reached out to MRI, pharmacy and ordering MD, to clarify order. Charge nurse aware.     Approx 1030 Dr Collins - T/O cancel order for Indocyanine, surgery cancelled, possible for tomorrow.     Waiting MRCP.

## 2024-03-29 ENCOUNTER — ANESTHESIA (OUTPATIENT)
Dept: SURGERY | Facility: MEDICAL CENTER | Age: 34
End: 2024-03-29
Payer: MEDICAID

## 2024-03-29 ENCOUNTER — ANESTHESIA EVENT (OUTPATIENT)
Dept: SURGERY | Facility: MEDICAL CENTER | Age: 34
End: 2024-03-29
Payer: MEDICAID

## 2024-03-29 LAB
ALBUMIN SERPL BCP-MCNC: 3.5 G/DL (ref 3.2–4.9)
ALBUMIN/GLOB SERPL: 1.3 G/DL
ALP SERPL-CCNC: 143 U/L (ref 30–99)
ALT SERPL-CCNC: 72 U/L (ref 2–50)
ANION GAP SERPL CALC-SCNC: 13 MMOL/L (ref 7–16)
AST SERPL-CCNC: 44 U/L (ref 12–45)
BASOPHILS # BLD AUTO: 0.5 % (ref 0–1.8)
BASOPHILS # BLD: 0.04 K/UL (ref 0–0.12)
BILIRUB SERPL-MCNC: 0.3 MG/DL (ref 0.1–1.5)
BUN SERPL-MCNC: 5 MG/DL (ref 8–22)
CALCIUM ALBUM COR SERPL-MCNC: 8.8 MG/DL (ref 8.5–10.5)
CALCIUM SERPL-MCNC: 8.4 MG/DL (ref 8.5–10.5)
CHLORIDE SERPL-SCNC: 107 MMOL/L (ref 96–112)
CO2 SERPL-SCNC: 20 MMOL/L (ref 20–33)
CREAT SERPL-MCNC: 0.42 MG/DL (ref 0.5–1.4)
EOSINOPHIL # BLD AUTO: 0.21 K/UL (ref 0–0.51)
EOSINOPHIL NFR BLD: 2.5 % (ref 0–6.9)
ERYTHROCYTE [DISTWIDTH] IN BLOOD BY AUTOMATED COUNT: 46.7 FL (ref 35.9–50)
GFR SERPLBLD CREATININE-BSD FMLA CKD-EPI: 133 ML/MIN/1.73 M 2
GLOBULIN SER CALC-MCNC: 2.6 G/DL (ref 1.9–3.5)
GLUCOSE SERPL-MCNC: 88 MG/DL (ref 65–99)
HCT VFR BLD AUTO: 39 % (ref 37–47)
HGB BLD-MCNC: 12.3 G/DL (ref 12–16)
IMM GRANULOCYTES # BLD AUTO: 0.04 K/UL (ref 0–0.11)
IMM GRANULOCYTES NFR BLD AUTO: 0.5 % (ref 0–0.9)
LYMPHOCYTES # BLD AUTO: 2.78 K/UL (ref 1–4.8)
LYMPHOCYTES NFR BLD: 32.6 % (ref 22–41)
MAGNESIUM SERPL-MCNC: 1.8 MG/DL (ref 1.5–2.5)
MCH RBC QN AUTO: 25.4 PG (ref 27–33)
MCHC RBC AUTO-ENTMCNC: 31.5 G/DL (ref 32.2–35.5)
MCV RBC AUTO: 80.4 FL (ref 81.4–97.8)
MONOCYTES # BLD AUTO: 0.57 K/UL (ref 0–0.85)
MONOCYTES NFR BLD AUTO: 6.7 % (ref 0–13.4)
NEUTROPHILS # BLD AUTO: 4.88 K/UL (ref 1.82–7.42)
NEUTROPHILS NFR BLD: 57.2 % (ref 44–72)
NRBC # BLD AUTO: 0 K/UL
NRBC BLD-RTO: 0 /100 WBC (ref 0–0.2)
PATHOLOGY CONSULT NOTE: NORMAL
PHOSPHATE SERPL-MCNC: 3.4 MG/DL (ref 2.5–4.5)
PLATELET # BLD AUTO: 304 K/UL (ref 164–446)
PMV BLD AUTO: 10.3 FL (ref 9–12.9)
POTASSIUM SERPL-SCNC: 3.8 MMOL/L (ref 3.6–5.5)
PROT SERPL-MCNC: 6.1 G/DL (ref 6–8.2)
RBC # BLD AUTO: 4.85 M/UL (ref 4.2–5.4)
SODIUM SERPL-SCNC: 140 MMOL/L (ref 135–145)
WBC # BLD AUTO: 8.5 K/UL (ref 4.8–10.8)

## 2024-03-29 PROCEDURE — 700102 HCHG RX REV CODE 250 W/ 637 OVERRIDE(OP): Performed by: ANESTHESIOLOGY

## 2024-03-29 PROCEDURE — 700105 HCHG RX REV CODE 258: Performed by: ANESTHESIOLOGY

## 2024-03-29 PROCEDURE — 83735 ASSAY OF MAGNESIUM: CPT

## 2024-03-29 PROCEDURE — 700111 HCHG RX REV CODE 636 W/ 250 OVERRIDE (IP): Mod: JZ | Performed by: ANESTHESIOLOGY

## 2024-03-29 PROCEDURE — 700101 HCHG RX REV CODE 250: Performed by: ANESTHESIOLOGY

## 2024-03-29 PROCEDURE — 502714 HCHG ROBOTIC SURGERY SERVICES: Performed by: SURGERY

## 2024-03-29 PROCEDURE — 99232 SBSQ HOSP IP/OBS MODERATE 35: CPT | Performed by: STUDENT IN AN ORGANIZED HEALTH CARE EDUCATION/TRAINING PROGRAM

## 2024-03-29 PROCEDURE — 700111 HCHG RX REV CODE 636 W/ 250 OVERRIDE (IP): Performed by: SURGERY

## 2024-03-29 PROCEDURE — 80053 COMPREHEN METABOLIC PANEL: CPT

## 2024-03-29 PROCEDURE — A9270 NON-COVERED ITEM OR SERVICE: HCPCS

## 2024-03-29 PROCEDURE — 8E0W4CZ ROBOTIC ASSISTED PROCEDURE OF TRUNK REGION, PERCUTANEOUS ENDOSCOPIC APPROACH: ICD-10-PCS | Performed by: SURGERY

## 2024-03-29 PROCEDURE — 36415 COLL VENOUS BLD VENIPUNCTURE: CPT

## 2024-03-29 PROCEDURE — 160002 HCHG RECOVERY MINUTES (STAT): Performed by: SURGERY

## 2024-03-29 PROCEDURE — 700102 HCHG RX REV CODE 250 W/ 637 OVERRIDE(OP)

## 2024-03-29 PROCEDURE — 84100 ASSAY OF PHOSPHORUS: CPT

## 2024-03-29 PROCEDURE — 160031 HCHG SURGERY MINUTES - 1ST 30 MINS LEVEL 5: Performed by: SURGERY

## 2024-03-29 PROCEDURE — 88304 TISSUE EXAM BY PATHOLOGIST: CPT

## 2024-03-29 PROCEDURE — 160035 HCHG PACU - 1ST 60 MINS PHASE I: Performed by: SURGERY

## 2024-03-29 PROCEDURE — 700111 HCHG RX REV CODE 636 W/ 250 OVERRIDE (IP): Performed by: STUDENT IN AN ORGANIZED HEALTH CARE EDUCATION/TRAINING PROGRAM

## 2024-03-29 PROCEDURE — 700102 HCHG RX REV CODE 250 W/ 637 OVERRIDE(OP): Performed by: STUDENT IN AN ORGANIZED HEALTH CARE EDUCATION/TRAINING PROGRAM

## 2024-03-29 PROCEDURE — A9270 NON-COVERED ITEM OR SERVICE: HCPCS | Performed by: ANESTHESIOLOGY

## 2024-03-29 PROCEDURE — 160042 HCHG SURGERY MINUTES - EA ADDL 1 MIN LEVEL 5: Performed by: SURGERY

## 2024-03-29 PROCEDURE — 85025 COMPLETE CBC W/AUTO DIFF WBC: CPT

## 2024-03-29 PROCEDURE — A9270 NON-COVERED ITEM OR SERVICE: HCPCS | Performed by: STUDENT IN AN ORGANIZED HEALTH CARE EDUCATION/TRAINING PROGRAM

## 2024-03-29 PROCEDURE — 160048 HCHG OR STATISTICAL LEVEL 1-5: Performed by: SURGERY

## 2024-03-29 PROCEDURE — 700101 HCHG RX REV CODE 250: Performed by: STUDENT IN AN ORGANIZED HEALTH CARE EDUCATION/TRAINING PROGRAM

## 2024-03-29 PROCEDURE — 0FT44ZZ RESECTION OF GALLBLADDER, PERCUTANEOUS ENDOSCOPIC APPROACH: ICD-10-PCS | Performed by: SURGERY

## 2024-03-29 PROCEDURE — 160009 HCHG ANES TIME/MIN: Performed by: SURGERY

## 2024-03-29 PROCEDURE — 700104 HCHG RX REV CODE 254: Performed by: SURGERY

## 2024-03-29 PROCEDURE — 700101 HCHG RX REV CODE 250: Performed by: SURGERY

## 2024-03-29 PROCEDURE — 160036 HCHG PACU - EA ADDL 30 MINS PHASE I: Performed by: SURGERY

## 2024-03-29 PROCEDURE — 770006 HCHG ROOM/CARE - MED/SURG/GYN SEMI*

## 2024-03-29 RX ORDER — OXYCODONE HCL 5 MG/5 ML
10 SOLUTION, ORAL ORAL
Status: COMPLETED | OUTPATIENT
Start: 2024-03-29 | End: 2024-03-29

## 2024-03-29 RX ORDER — ONDANSETRON 2 MG/ML
INJECTION INTRAMUSCULAR; INTRAVENOUS PRN
Status: DISCONTINUED | OUTPATIENT
Start: 2024-03-29 | End: 2024-03-29 | Stop reason: SURG

## 2024-03-29 RX ORDER — HYDROMORPHONE HYDROCHLORIDE 1 MG/ML
0.1 INJECTION, SOLUTION INTRAMUSCULAR; INTRAVENOUS; SUBCUTANEOUS
Status: DISCONTINUED | OUTPATIENT
Start: 2024-03-29 | End: 2024-03-29 | Stop reason: HOSPADM

## 2024-03-29 RX ORDER — MEPERIDINE HYDROCHLORIDE 25 MG/ML
12.5 INJECTION INTRAMUSCULAR; INTRAVENOUS; SUBCUTANEOUS
Status: DISCONTINUED | OUTPATIENT
Start: 2024-03-29 | End: 2024-03-29 | Stop reason: HOSPADM

## 2024-03-29 RX ORDER — ONDANSETRON 2 MG/ML
4 INJECTION INTRAMUSCULAR; INTRAVENOUS
Status: DISCONTINUED | OUTPATIENT
Start: 2024-03-29 | End: 2024-03-29 | Stop reason: HOSPADM

## 2024-03-29 RX ORDER — HYDRALAZINE HYDROCHLORIDE 20 MG/ML
5 INJECTION INTRAMUSCULAR; INTRAVENOUS
Status: DISCONTINUED | OUTPATIENT
Start: 2024-03-29 | End: 2024-03-29 | Stop reason: HOSPADM

## 2024-03-29 RX ORDER — ROCURONIUM BROMIDE 10 MG/ML
INJECTION, SOLUTION INTRAVENOUS PRN
Status: DISCONTINUED | OUTPATIENT
Start: 2024-03-29 | End: 2024-03-29 | Stop reason: SURG

## 2024-03-29 RX ORDER — MIDAZOLAM HYDROCHLORIDE 1 MG/ML
1 INJECTION INTRAMUSCULAR; INTRAVENOUS
Status: DISCONTINUED | OUTPATIENT
Start: 2024-03-29 | End: 2024-03-29 | Stop reason: HOSPADM

## 2024-03-29 RX ORDER — INDOCYANINE GREEN AND WATER 25 MG
2.5 KIT INJECTION ONCE
Status: COMPLETED | OUTPATIENT
Start: 2024-03-29 | End: 2024-03-29

## 2024-03-29 RX ORDER — DEXAMETHASONE SODIUM PHOSPHATE 4 MG/ML
INJECTION, SOLUTION INTRA-ARTICULAR; INTRALESIONAL; INTRAMUSCULAR; INTRAVENOUS; SOFT TISSUE PRN
Status: DISCONTINUED | OUTPATIENT
Start: 2024-03-29 | End: 2024-03-29 | Stop reason: SURG

## 2024-03-29 RX ORDER — INDOCYANINE GREEN AND WATER 25 MG
25 KIT INJECTION ONCE
Status: DISCONTINUED | OUTPATIENT
Start: 2024-03-29 | End: 2024-03-29

## 2024-03-29 RX ORDER — SCOLOPAMINE TRANSDERMAL SYSTEM 1 MG/1
PATCH, EXTENDED RELEASE TRANSDERMAL
Status: COMPLETED
Start: 2024-03-29 | End: 2024-03-29

## 2024-03-29 RX ORDER — OXYCODONE HCL 5 MG/5 ML
5 SOLUTION, ORAL ORAL
Status: COMPLETED | OUTPATIENT
Start: 2024-03-29 | End: 2024-03-29

## 2024-03-29 RX ORDER — BUPIVACAINE HYDROCHLORIDE AND EPINEPHRINE 2.5; 5 MG/ML; UG/ML
INJECTION, SOLUTION EPIDURAL; INFILTRATION; INTRACAUDAL; PERINEURAL
Status: DISCONTINUED | OUTPATIENT
Start: 2024-03-29 | End: 2024-03-29 | Stop reason: HOSPADM

## 2024-03-29 RX ORDER — LIDOCAINE HYDROCHLORIDE 20 MG/ML
INJECTION, SOLUTION EPIDURAL; INFILTRATION; INTRACAUDAL; PERINEURAL PRN
Status: DISCONTINUED | OUTPATIENT
Start: 2024-03-29 | End: 2024-03-29 | Stop reason: SURG

## 2024-03-29 RX ORDER — EPHEDRINE SULFATE 50 MG/ML
INJECTION, SOLUTION INTRAVENOUS PRN
Status: DISCONTINUED | OUTPATIENT
Start: 2024-03-29 | End: 2024-03-29 | Stop reason: SURG

## 2024-03-29 RX ORDER — HALOPERIDOL 5 MG/ML
1 INJECTION INTRAMUSCULAR
Status: DISCONTINUED | OUTPATIENT
Start: 2024-03-29 | End: 2024-03-29 | Stop reason: HOSPADM

## 2024-03-29 RX ORDER — HYDROMORPHONE HYDROCHLORIDE 1 MG/ML
0.4 INJECTION, SOLUTION INTRAMUSCULAR; INTRAVENOUS; SUBCUTANEOUS
Status: DISCONTINUED | OUTPATIENT
Start: 2024-03-29 | End: 2024-03-29 | Stop reason: HOSPADM

## 2024-03-29 RX ORDER — MIDAZOLAM HYDROCHLORIDE 1 MG/ML
INJECTION INTRAMUSCULAR; INTRAVENOUS PRN
Status: DISCONTINUED | OUTPATIENT
Start: 2024-03-29 | End: 2024-03-29 | Stop reason: SURG

## 2024-03-29 RX ORDER — SODIUM CHLORIDE, SODIUM LACTATE, POTASSIUM CHLORIDE, CALCIUM CHLORIDE 600; 310; 30; 20 MG/100ML; MG/100ML; MG/100ML; MG/100ML
INJECTION, SOLUTION INTRAVENOUS CONTINUOUS
Status: DISCONTINUED | OUTPATIENT
Start: 2024-03-29 | End: 2024-03-29 | Stop reason: HOSPADM

## 2024-03-29 RX ORDER — SODIUM CHLORIDE, SODIUM LACTATE, POTASSIUM CHLORIDE, CALCIUM CHLORIDE 600; 310; 30; 20 MG/100ML; MG/100ML; MG/100ML; MG/100ML
INJECTION, SOLUTION INTRAVENOUS
Status: DISCONTINUED | OUTPATIENT
Start: 2024-03-29 | End: 2024-03-29 | Stop reason: SURG

## 2024-03-29 RX ORDER — DIPHENHYDRAMINE HYDROCHLORIDE 50 MG/ML
12.5 INJECTION INTRAMUSCULAR; INTRAVENOUS
Status: DISCONTINUED | OUTPATIENT
Start: 2024-03-29 | End: 2024-03-29 | Stop reason: HOSPADM

## 2024-03-29 RX ORDER — HYDROMORPHONE HYDROCHLORIDE 1 MG/ML
0.2 INJECTION, SOLUTION INTRAMUSCULAR; INTRAVENOUS; SUBCUTANEOUS
Status: DISCONTINUED | OUTPATIENT
Start: 2024-03-29 | End: 2024-03-29 | Stop reason: HOSPADM

## 2024-03-29 RX ORDER — HEPARIN SODIUM 5000 [USP'U]/ML
5000 INJECTION, SOLUTION INTRAVENOUS; SUBCUTANEOUS ONCE
Status: DISCONTINUED | OUTPATIENT
Start: 2024-03-29 | End: 2024-03-29

## 2024-03-29 RX ORDER — METOPROLOL TARTRATE 1 MG/ML
1 INJECTION, SOLUTION INTRAVENOUS
Status: DISCONTINUED | OUTPATIENT
Start: 2024-03-29 | End: 2024-03-29 | Stop reason: HOSPADM

## 2024-03-29 RX ORDER — HEPARIN SODIUM 5000 [USP'U]/ML
5000 INJECTION, SOLUTION INTRAVENOUS; SUBCUTANEOUS ONCE
Status: COMPLETED | OUTPATIENT
Start: 2024-03-29 | End: 2024-03-29

## 2024-03-29 RX ORDER — EPHEDRINE SULFATE 50 MG/ML
5 INJECTION, SOLUTION INTRAVENOUS
Status: DISCONTINUED | OUTPATIENT
Start: 2024-03-29 | End: 2024-03-29 | Stop reason: HOSPADM

## 2024-03-29 RX ORDER — LABETALOL HYDROCHLORIDE 5 MG/ML
5 INJECTION, SOLUTION INTRAVENOUS
Status: DISCONTINUED | OUTPATIENT
Start: 2024-03-29 | End: 2024-03-29 | Stop reason: HOSPADM

## 2024-03-29 RX ADMIN — ONDANSETRON 8 MG: 2 INJECTION INTRAMUSCULAR; INTRAVENOUS at 11:55

## 2024-03-29 RX ADMIN — EPHEDRINE SULFATE 10 MG: 50 INJECTION, SOLUTION INTRAVENOUS at 11:20

## 2024-03-29 RX ADMIN — TRAMADOL HYDROCHLORIDE 50 MG: 50 TABLET ORAL at 21:57

## 2024-03-29 RX ADMIN — MIDAZOLAM HYDROCHLORIDE 2 MG: 1 INJECTION, SOLUTION INTRAMUSCULAR; INTRAVENOUS at 10:36

## 2024-03-29 RX ADMIN — SUGAMMADEX 400 MG: 100 INJECTION, SOLUTION INTRAVENOUS at 12:11

## 2024-03-29 RX ADMIN — FENTANYL CITRATE 100 MCG: 50 INJECTION, SOLUTION INTRAMUSCULAR; INTRAVENOUS at 11:10

## 2024-03-29 RX ADMIN — FENTANYL CITRATE 75 MCG: 50 INJECTION, SOLUTION INTRAMUSCULAR; INTRAVENOUS at 11:40

## 2024-03-29 RX ADMIN — OMEPRAZOLE 20 MG: 20 CAPSULE, DELAYED RELEASE ORAL at 04:38

## 2024-03-29 RX ADMIN — FENTANYL CITRATE 50 MCG: 50 INJECTION, SOLUTION INTRAMUSCULAR; INTRAVENOUS at 12:54

## 2024-03-29 RX ADMIN — TRAMADOL HYDROCHLORIDE 50 MG: 50 TABLET ORAL at 17:02

## 2024-03-29 RX ADMIN — METRONIDAZOLE 500 MG: 5 INJECTION, SOLUTION INTRAVENOUS at 04:43

## 2024-03-29 RX ADMIN — SCOPOLAMINE 1 PATCH: 1.5 PATCH, EXTENDED RELEASE TRANSDERMAL at 10:30

## 2024-03-29 RX ADMIN — SODIUM CHLORIDE, POTASSIUM CHLORIDE, SODIUM LACTATE AND CALCIUM CHLORIDE: 600; 310; 30; 20 INJECTION, SOLUTION INTRAVENOUS at 11:03

## 2024-03-29 RX ADMIN — HEPARIN SODIUM 5000 UNITS: 5000 INJECTION, SOLUTION INTRAVENOUS; SUBCUTANEOUS at 09:55

## 2024-03-29 RX ADMIN — METRONIDAZOLE 500 MG: 5 INJECTION, SOLUTION INTRAVENOUS at 17:13

## 2024-03-29 RX ADMIN — OXYCODONE HYDROCHLORIDE 10 MG: 5 SOLUTION ORAL at 12:52

## 2024-03-29 RX ADMIN — CEFTRIAXONE SODIUM 1000 MG: 10 INJECTION, POWDER, FOR SOLUTION INTRAVENOUS at 04:40

## 2024-03-29 RX ADMIN — FENTANYL CITRATE 75 MCG: 50 INJECTION, SOLUTION INTRAMUSCULAR; INTRAVENOUS at 11:26

## 2024-03-29 RX ADMIN — INDOCYANINE GREEN AND WATER 2.5 MG: KIT at 10:02

## 2024-03-29 RX ADMIN — LIDOCAINE HYDROCHLORIDE 100 MG: 20 INJECTION, SOLUTION EPIDURAL; INFILTRATION; INTRACAUDAL at 11:10

## 2024-03-29 RX ADMIN — ROCURONIUM BROMIDE 80 MG: 50 INJECTION, SOLUTION INTRAVENOUS at 11:10

## 2024-03-29 RX ADMIN — PROPOFOL 200 MG: 10 INJECTION, EMULSION INTRAVENOUS at 11:10

## 2024-03-29 RX ADMIN — DEXAMETHASONE SODIUM PHOSPHATE 8 MG: 4 INJECTION INTRA-ARTICULAR; INTRALESIONAL; INTRAMUSCULAR; INTRAVENOUS; SOFT TISSUE at 11:10

## 2024-03-29 ASSESSMENT — ENCOUNTER SYMPTOMS
CONSTITUTIONAL NEGATIVE: 1
CARDIOVASCULAR NEGATIVE: 1
RESPIRATORY NEGATIVE: 1
GASTROINTESTINAL NEGATIVE: 1
PSYCHIATRIC NEGATIVE: 1
MUSCULOSKELETAL NEGATIVE: 1
EYES NEGATIVE: 1
NEUROLOGICAL NEGATIVE: 1

## 2024-03-29 ASSESSMENT — PAIN SCALES - GENERAL: PAIN_LEVEL: 6

## 2024-03-29 ASSESSMENT — PAIN DESCRIPTION - PAIN TYPE
TYPE: SURGICAL PAIN

## 2024-03-29 NOTE — ANESTHESIA POSTPROCEDURE EVALUATION
Patient: Christi Mcgarry    Procedure Summary       Date: 03/29/24 Room / Location: [facility] 11 / SURGERY [facility]    Anesthesia Start: 1103 Anesthesia Stop: 1226    Procedure: CHOLECYSTECTOMY, ROBOT-ASSISTED, USING DA IVAN XI (Abdomen) Diagnosis: (acute cholecystitis)    Surgeons: Bill Collins M.D. Responsible Provider: Song Blanc D.O.    Anesthesia Type: general ASA Status: 3            Final Anesthesia Type: general  Last vitals  BP   Blood Pressure: (!) 150/70    Temp   36.4 °C (97.6 °F)    Pulse   69   Resp   16    SpO2   100 %      Anesthesia Post Evaluation    Patient location during evaluation: PACU  Patient participation: complete - patient participated  Level of consciousness: awake and alert  Pain score: 6    Airway patency: patent  Anesthetic complications: no  Cardiovascular status: hemodynamically stable  Respiratory status: acceptable  Hydration status: euvolemic    PONV: none          No notable events documented.     Nurse Pain Score: 7 (NPRS)

## 2024-03-29 NOTE — PROGRESS NOTES
Assumed care of pt from NOC RN. Pt resting with no signs of distress at bedside report. Family at bedside. Bed locked in lowest position. Call light and personal belongings in reach.

## 2024-03-29 NOTE — PROGRESS NOTES
Hospital Medicine Daily Progress Note    Date of Service  3/29/2024    Chief Complaint  Christi Mcgarry is a 33 y.o. female admitted 3/27/2024 with abd pain    Hospital Course  33 y.o. female who presented 3/27/2024 with abd pain.  PMH of Down syndrome, DELIA on CPAP, s/p gastric sleeve 12/2023 by Dr. Collins.  Comes in complaining of right upper quadrant abdominal pain that began while she was at work after drinking orange juice.  Pain has persisted and is not improving, associated nausea but no vomiting or bowel changes.  Denies fever/chills, no similar episodes in the past.     In the ED afebrile, hemodynamically stable.  Labs showed WBC count of 18, elevated LFTs, potassium 3.6.    3/28 MRCP without CBD dilation and noted for cholelithiasis without overt cholecystitis.    Interval Problem Update  ABD pain better today  Stable vitals  On room air  Leukocytosis resolved  Keep n.p.o. per surgery team  IV hydration  IV ceftriaxone/metronidazole  General Surgery following, aim for OR today  Labs on AM    I have discussed this patient's plan of care and discharge plan at IDT rounds today with Case Management, Nursing, Nursing leadership, and other members of the IDT team.    Consultants/Specialty  Surgery     Code Status  Full Code    Disposition  The patient is not medically cleared for discharge to home or a post-acute facility.  Anticipate discharge to: home with close outpatient follow-up    I have placed the appropriate orders for post-discharge needs.    Review of Systems  Review of Systems   Constitutional: Negative.    HENT: Negative.     Eyes: Negative.    Respiratory: Negative.     Cardiovascular: Negative.    Gastrointestinal: Negative.    Genitourinary: Negative.    Musculoskeletal: Negative.    Skin: Negative.    Neurological: Negative.    Endo/Heme/Allergies: Negative.    Psychiatric/Behavioral: Negative.          Physical Exam  Temp:  [36.3 °C (97.4 °F)] 36.3 °C (97.4 °F)  Pulse:  [66-72] 66  Resp:   [16-20] 18  BP: (105-120)/(44-54) 118/51  SpO2:  [96 %-97 %] 97 %    Physical Exam  Constitutional:       Appearance: Normal appearance.   HENT:      Head: Normocephalic and atraumatic.      Mouth/Throat:      Mouth: Mucous membranes are moist.   Eyes:      Extraocular Movements: Extraocular movements intact.      Pupils: Pupils are equal, round, and reactive to light.   Cardiovascular:      Rate and Rhythm: Normal rate and regular rhythm.      Pulses: Normal pulses.      Heart sounds: Normal heart sounds.   Pulmonary:      Effort: Pulmonary effort is normal.      Breath sounds: Normal breath sounds.   Abdominal:      General: Bowel sounds are normal.      Palpations: Abdomen is soft.      Tenderness: There is abdominal tenderness.   Musculoskeletal:         General: No swelling. Normal range of motion.      Cervical back: Normal range of motion and neck supple.   Skin:     General: Skin is warm.      Coloration: Skin is not jaundiced.   Neurological:      General: No focal deficit present.      Mental Status: She is alert and oriented to person, place, and time. Mental status is at baseline.      Cranial Nerves: No cranial nerve deficit.   Psychiatric:         Mood and Affect: Mood normal.         Behavior: Behavior normal.         Thought Content: Thought content normal.         Judgment: Judgment normal.       Fluids  No intake or output data in the 24 hours ending 03/29/24 0724      Laboratory  Recent Labs     03/27/24  1538 03/28/24  0331 03/29/24  0256   WBC 18.6* 10.6 8.5   RBC 5.44* 4.88 4.85   HEMOGLOBIN 13.9 12.7 12.3   HEMATOCRIT 44.2 40.0 39.0   MCV 81.3* 82.0 80.4*   MCH 25.6* 26.0* 25.4*   MCHC 31.4* 31.8* 31.5*   RDW 46.5 47.3 46.7   PLATELETCT 370 343 304   MPV 10.8 10.5 10.3     Recent Labs     03/27/24  1538 03/28/24  0331 03/29/24  0256   SODIUM 140 141 140   POTASSIUM 3.6 4.3 3.8   CHLORIDE 106 109 107   CO2 21 23 20   GLUCOSE 116* 122* 88   BUN 12 6* 5*   CREATININE 0.43* 0.46* 0.42*   CALCIUM  9.0 8.4* 8.4*                   Imaging  [id number]   Final Result      1.  CBD is around 5 to 6 mm transverse on this study. It does not appear dilated on this study. No obvious choledocholithiasis is visible although the study is fairly degraded.   2.  Cholelithiasis. There is not overt cholecystitis visible here. If there is concern for cholecystitis, HIDA scan could be performed for definitive assessment.      US-RUQ   Final Result         1.  Cholelithiasis and gallbladder sludge without additional sonographic findings of acute cholecystitis.   2.  Common bile duct dilatation, consider causes of biliary obstruction with additional workup as clinically appropriate.   3.  Echogenic liver, compatible with fatty change versus fibrosis.      CT-ABDOMEN-PELVIS WITH   Final Result      1.  Question of faint pericholecystic fat stranding. Recommend right upper quadrant ultrasound for further workup.           Assessment/Plan  * Right upper quadrant abdominal pain- (present on admission)  Assessment & Plan  Right upper quadrant abdominal pain that began after drinking juice earlier today, no similar episodes in the past  Rodriguez sign positive on exam  Labs showed elevated LFTs and leukocytosis.  No fever/chills  CT scan of the abdomen reviewed and shows possible pericholecystic fat stranding  Ultrasound reviewed and shows gallbladder stones and sludge but no obvious inflammation.  She does have dilated CBD  EDP discussed with patient's surgeon, Dr. Collins recommends MRCP   n.p.o., IV fluids, pain management, MRCP ordered      Hypokalemia- (present on admission)  Assessment & Plan  Potassium 3.6 on presentation.  Replete as needed check magnesium levels.  BMP ordered continue monitoring    Class 3 severe obesity due to excess calories without serious comorbidity with body mass index (BMI) of 50.0 to 59.9 in adult (HCC)- (present on admission)  Assessment & Plan  BMI 50.18.  Recent gastric sleeve    Obstructive  sleep apnea syndrome- (present on admission)  Assessment & Plan  Nocturnal CPAP         VTE prophylaxis: SCDs, Aim for OR today    I have performed a physical exam and reviewed and updated ROS and Plan today (3/29/2024). In review of yesterday's note (3/28/2024), there are no changes except as documented above.

## 2024-03-29 NOTE — ANESTHESIA TIME REPORT
Anesthesia Start and Stop Event Times       Date Time Event    3/29/2024 1038 Ready for Procedure     1103 Anesthesia Start     1226 Anesthesia Stop          Responsible Staff  03/29/24      Name Role Begin End    Song Blanc D.O. Anesth 1103 1226          Overtime Reason:  no overtime (within assigned shift)    Comments:

## 2024-03-29 NOTE — OP REPORT
[facility]    OPERATIVE REPORT         Date of Operation: 3/29/2024    Preoperative Diagnoses:  1. Biliary colic vs acute cholecystitis  2. Cholelithiasis  3. Morbid obesity and DELIA  4. S/p robotic sleeve gastrectomy and wedge liver biopsy on 12/22/2023    Postoperative Diagnoses:  1. Acute cholecystitis  2. Cholelithiasis  3. Morbid obesity and DELIA  4. S/p robotic sleeve gastrectomy and wedge liver biopsy on 12/22/2023      Operation Performed:  1. Robotic cholecystectomy  2. Intraoperative near-infrared (ICG) fluorescence cholangiography    Surgeon: Bill Collins MD MPH FACS [facility]    Assistant(s): Jasmine Mitchell PA-C    Anesthesiologist(s): Song Blanc DO    Anesthesia: General endotracheal    Estimated Blood Loss: 5 mL      Specimen(s): Gallbladder with contents    Urine output: N/A    Complications: None    Findings: Distended, inflamed-appearing gallbladder with a thickened peritoneal covering and multiple palpable stone(s) in the gallbladder. Significant adhesions to the surrounding greater omentum and anterior duodenal wall. No other abnormalities were noted on diagnostic laparoscopy.    Background: This is a pleasant 33 y.o. female with a history of morbid obesity and DELIA, s/p robotic sleeve gastrectomy and wedge liver biopsy on 12/22/2023. She presented on 3/27/2024 with symptoms of RUQ/epigastric abdominal pain, associated with nausea, and was found to have leukocytosis, cholelithiasis, and questionable cholecystitis on her diagnostic workup (CT of abdomen/pelvis, RUQ U/S, and MRCP). Thus, a presumptive diagnosis of acute cholecystitis vs biliary colic was given and she was recommended to undergo a robotic/laparoscopic, possible open, cholecystectomy with a possible intraoperative cholangiogram.     We discussed the risks of surgery including infection, bleeding, need for transfusion, blood clot formation, pulmonary embolus, pneumonia, leak or perforation, recurrence of  "symptoms, and death. In addition, the risks of general anesthetic were discussed, including MI, CVA, reaction to anesthetic medications, or sudden death. The patient understands all of the above risks and all questions were answered to complete satisfaction.    Operative Procedure: The patient was brought to the Operating Room and placed supine on the operating table. Intermittent compression devices were placed on bilateral lower extremities. General endotracheal anesthesia was induced and patient was intubated. Perioperative antibiotics were not necessary since patient was already maintained on the broad spectrum antibiotics while admitted to the Hospitalist service. Near-infrared indocyanine green (ICG) dye was administered to the patient at this time. Approximately 1 ml of ICG mixed with sterile water was given, in concentration of 2.5 mg/mL, in order to be used for subsequent fluoroscopic-assisted cholangiography. The abdomen was prepped and draped in the usual sterile manner. The universal time-out procedure was completed.    The abdomen was entered with a Veress needle in the left upper quadrant, at the \"Scott's point\". Correct placement was confirmed by the saline drop test. The CO2 insufflator was attached and pneumoperitoneum was created to a total pressure of 15 mmHg without incident. A 5 mm blunt tipped working port (FIOS) was placed in the left upper quadrant without incident and a 0 degree 5 mm laparoscope was introduced into the abdomen, with its camera attached. No injuries were noted from initial or subsequent trocar placement. Four robotic, 8 mm, trocars were placed in the bilateral upper quadrants, all under direct vision. The patient was then placed in the reverse Trendelenburg position, with the left side down. The da Renetta Xi robotic system was then successfully docked to the patient's right side and the remainder of this operation was performed with assistance of the robot.    The gallbladder " was identified and its fundus was grasped and retracted cephalad. The gallbladder appeared distended and somewhat inflamed, and had a thickened peritoneal covering, as well as, multiple palpable stone(s) in the gallbladder. Significant adhesions to the surrounding greater omentum and anterior duodenal wall were also noted. There were no other abnormalities noted on diagnostic laparoscopy. All surrounding adhesions were taken down with our robotic monopolar scissors, and the infundibulum was grasped and then retracted laterally, exposing the peritoneum overlying the gallbladder. Dissection was initiated posteriorly and then anteriorly until the critical structures were identified. The triangle of Calot was dissected and the cystic artery was identified. The cystic duct was also similarly identified and a critical view was obtained. Near infrared fluoroscopic-assisted cholangiography was then used to confirm the anatomical findings, as above. The cystic duct and cystic artery were clipped proximally and distally, and then transected. The gallbladder was dissected from the liver bed with electrocautery. The liver bed was inspected and hemostasis was ensured. The gallbladder was then removed using an EndoCatch bag through the periumbilical trocar site. The robotic system was undocked and the fascia of the periumbilical incision was closed using a single 0-Vicryl suture. Pneumoperitoneum was evacuated after hemostasis was again ensured. The skin of each incision was closed using 4-0 Monocryl sutures and Dermabond skin glue was applied. All sponge, instrument, and needle counts were correct at the end of the case. The patient tolerated the procedure well and was taken to the PACU in stable condition.     Dr. Collins was present and scrubbed throughout the entirety of this case.

## 2024-03-29 NOTE — CARE PLAN
The patient is Stable - Low risk of patient condition declining or worsening    Shift Goals  Clinical Goals: npo  Patient Goals: rest  Family Goals: mri results    Progress made toward(s) clinical / shift goals:      Patients pain will be managed during shift    Patient is not progressing towards the following goals:

## 2024-03-29 NOTE — PROGRESS NOTES
33 y.o. Female with history of morbid obesity, s/p sleeve gastrectomy ~ 4 months ago. Now, presented with possible cholecystitis vs biliary colic in the setting of cholelithiasis. MRCP last night did not demonstrate any evidence of CBD stones or obstruction.    Thus, plan is to proceed with robotic/laparoscopic, possible open, cholecystectomy and ICG cholangiography today. Informed consent was obtained from patient and her family, and all questions were answered.    Thank you for giving us this opportunity to care for this patient.      Bill Collins MD MPH FACS [facility]  Bariatric and Gastroesophageal Surgery  [facility]  Clinical Assistant Professor of Surgery and Physiology & Cell Biology  [facility]

## 2024-03-29 NOTE — OR NURSING
Report called to Lisa CHAO. Plan of care discussed. Patient reports tolerable discomfort in abdomen. No complaints of nausea. Patient tolerating sips of water without difficulty. Patient expresses desire to proceed to room to see sister. Update provided via text messaging system. Patient's blanket on bed. Patient updated on plans to transfer to room.

## 2024-03-29 NOTE — PROGRESS NOTES
Pt struggled to get out of bed to go to the bathroom. Right lap site had a moderated amount of bleeding. Guaze and pressure applied and bleeding stopped. MD's notified via volte.

## 2024-03-29 NOTE — ANESTHESIA PROCEDURE NOTES
Airway    Date/Time: 3/29/2024 11:12 AM    Performed by: Song Blanc D.O.  Authorized by: Song Blanc D.O.    Location:  OR  Urgency:  Elective  Indications for Airway Management:  Anesthesia      Spontaneous Ventilation: absent    Sedation Level:  Deep  Preoxygenated: Yes    Patient Position:  Sniffing  Mask Difficulty Assessment:  2 - vent by mask + OA or adjuvant +/- NMBA  Final Airway Type:  Endotracheal airway  Final Endotracheal Airway:  ETT  Cuffed: Yes    Technique Used for Successful ETT Placement:  Direct laryngoscopy    Insertion Site:  Oral  Blade Type:  Aria  Laryngoscope Blade/Videolaryngoscope Blade Size:  3  ETT Size (mm):  7.0  Measured from:  Teeth  ETT to Teeth (cm):  20  Placement Verified by: auscultation and capnometry    Cormack-Lehane Classification:  Grade IIa - partial view of glottis  Number of Attempts at Approach:  1

## 2024-03-30 LAB
ALBUMIN SERPL BCP-MCNC: 4.1 G/DL (ref 3.2–4.9)
ALBUMIN/GLOB SERPL: 1.5 G/DL
ALP SERPL-CCNC: 178 U/L (ref 30–99)
ALT SERPL-CCNC: 99 U/L (ref 2–50)
ANION GAP SERPL CALC-SCNC: 12 MMOL/L (ref 7–16)
AST SERPL-CCNC: 62 U/L (ref 12–45)
BASOPHILS # BLD AUTO: 0.2 % (ref 0–1.8)
BASOPHILS # BLD: 0.03 K/UL (ref 0–0.12)
BILIRUB SERPL-MCNC: 0.3 MG/DL (ref 0.1–1.5)
BUN SERPL-MCNC: 5 MG/DL (ref 8–22)
CALCIUM ALBUM COR SERPL-MCNC: 8.8 MG/DL (ref 8.5–10.5)
CALCIUM SERPL-MCNC: 8.9 MG/DL (ref 8.5–10.5)
CHLORIDE SERPL-SCNC: 103 MMOL/L (ref 96–112)
CO2 SERPL-SCNC: 23 MMOL/L (ref 20–33)
CREAT SERPL-MCNC: 0.44 MG/DL (ref 0.5–1.4)
EOSINOPHIL # BLD AUTO: 0 K/UL (ref 0–0.51)
EOSINOPHIL NFR BLD: 0 % (ref 0–6.9)
ERYTHROCYTE [DISTWIDTH] IN BLOOD BY AUTOMATED COUNT: 45.4 FL (ref 35.9–50)
GFR SERPLBLD CREATININE-BSD FMLA CKD-EPI: 131 ML/MIN/1.73 M 2
GLOBULIN SER CALC-MCNC: 2.7 G/DL (ref 1.9–3.5)
GLUCOSE SERPL-MCNC: 117 MG/DL (ref 65–99)
HCT VFR BLD AUTO: 42.1 % (ref 37–47)
HGB BLD-MCNC: 13.5 G/DL (ref 12–16)
IMM GRANULOCYTES # BLD AUTO: 0.1 K/UL (ref 0–0.11)
IMM GRANULOCYTES NFR BLD AUTO: 0.6 % (ref 0–0.9)
LYMPHOCYTES # BLD AUTO: 1.47 K/UL (ref 1–4.8)
LYMPHOCYTES NFR BLD: 8.8 % (ref 22–41)
MCH RBC QN AUTO: 25.6 PG (ref 27–33)
MCHC RBC AUTO-ENTMCNC: 32.1 G/DL (ref 32.2–35.5)
MCV RBC AUTO: 79.7 FL (ref 81.4–97.8)
MONOCYTES # BLD AUTO: 0.61 K/UL (ref 0–0.85)
MONOCYTES NFR BLD AUTO: 3.6 % (ref 0–13.4)
NEUTROPHILS # BLD AUTO: 14.55 K/UL (ref 1.82–7.42)
NEUTROPHILS NFR BLD: 86.8 % (ref 44–72)
NRBC # BLD AUTO: 0 K/UL
NRBC BLD-RTO: 0 /100 WBC (ref 0–0.2)
PLATELET # BLD AUTO: 366 K/UL (ref 164–446)
PMV BLD AUTO: 10.4 FL (ref 9–12.9)
POTASSIUM SERPL-SCNC: 4.3 MMOL/L (ref 3.6–5.5)
PROT SERPL-MCNC: 6.8 G/DL (ref 6–8.2)
RBC # BLD AUTO: 5.28 M/UL (ref 4.2–5.4)
SODIUM SERPL-SCNC: 138 MMOL/L (ref 135–145)
WBC # BLD AUTO: 16.8 K/UL (ref 4.8–10.8)

## 2024-03-30 PROCEDURE — 700111 HCHG RX REV CODE 636 W/ 250 OVERRIDE (IP): Mod: JZ | Performed by: STUDENT IN AN ORGANIZED HEALTH CARE EDUCATION/TRAINING PROGRAM

## 2024-03-30 PROCEDURE — 700101 HCHG RX REV CODE 250: Performed by: STUDENT IN AN ORGANIZED HEALTH CARE EDUCATION/TRAINING PROGRAM

## 2024-03-30 PROCEDURE — 700105 HCHG RX REV CODE 258: Performed by: STUDENT IN AN ORGANIZED HEALTH CARE EDUCATION/TRAINING PROGRAM

## 2024-03-30 PROCEDURE — 770006 HCHG ROOM/CARE - MED/SURG/GYN SEMI*

## 2024-03-30 PROCEDURE — A9270 NON-COVERED ITEM OR SERVICE: HCPCS | Performed by: STUDENT IN AN ORGANIZED HEALTH CARE EDUCATION/TRAINING PROGRAM

## 2024-03-30 PROCEDURE — 80053 COMPREHEN METABOLIC PANEL: CPT

## 2024-03-30 PROCEDURE — 99232 SBSQ HOSP IP/OBS MODERATE 35: CPT | Performed by: STUDENT IN AN ORGANIZED HEALTH CARE EDUCATION/TRAINING PROGRAM

## 2024-03-30 PROCEDURE — 85025 COMPLETE CBC W/AUTO DIFF WBC: CPT

## 2024-03-30 PROCEDURE — 36415 COLL VENOUS BLD VENIPUNCTURE: CPT

## 2024-03-30 PROCEDURE — 700102 HCHG RX REV CODE 250 W/ 637 OVERRIDE(OP): Performed by: STUDENT IN AN ORGANIZED HEALTH CARE EDUCATION/TRAINING PROGRAM

## 2024-03-30 RX ORDER — SODIUM CHLORIDE, SODIUM LACTATE, POTASSIUM CHLORIDE, CALCIUM CHLORIDE 600; 310; 30; 20 MG/100ML; MG/100ML; MG/100ML; MG/100ML
INJECTION, SOLUTION INTRAVENOUS CONTINUOUS
Status: DISCONTINUED | OUTPATIENT
Start: 2024-03-30 | End: 2024-03-31

## 2024-03-30 RX ORDER — ENOXAPARIN SODIUM 100 MG/ML
60 INJECTION SUBCUTANEOUS 2 TIMES DAILY
Status: DISCONTINUED | OUTPATIENT
Start: 2024-03-30 | End: 2024-03-31 | Stop reason: HOSPADM

## 2024-03-30 RX ORDER — ENOXAPARIN SODIUM 100 MG/ML
60 INJECTION SUBCUTANEOUS 2 TIMES DAILY
Status: DISCONTINUED | OUTPATIENT
Start: 2024-03-30 | End: 2024-03-30

## 2024-03-30 RX ADMIN — TRAMADOL HYDROCHLORIDE 50 MG: 50 TABLET ORAL at 13:39

## 2024-03-30 RX ADMIN — METRONIDAZOLE 500 MG: 5 INJECTION, SOLUTION INTRAVENOUS at 05:51

## 2024-03-30 RX ADMIN — ENOXAPARIN SODIUM 60 MG: 100 INJECTION SUBCUTANEOUS at 17:05

## 2024-03-30 RX ADMIN — TRAMADOL HYDROCHLORIDE 50 MG: 50 TABLET ORAL at 22:51

## 2024-03-30 RX ADMIN — SODIUM CHLORIDE, POTASSIUM CHLORIDE, SODIUM LACTATE AND CALCIUM CHLORIDE: 600; 310; 30; 20 INJECTION, SOLUTION INTRAVENOUS at 22:52

## 2024-03-30 RX ADMIN — OMEPRAZOLE 20 MG: 20 CAPSULE, DELAYED RELEASE ORAL at 05:41

## 2024-03-30 RX ADMIN — TRAMADOL HYDROCHLORIDE 50 MG: 50 TABLET ORAL at 07:53

## 2024-03-30 RX ADMIN — METRONIDAZOLE 500 MG: 5 INJECTION, SOLUTION INTRAVENOUS at 16:52

## 2024-03-30 RX ADMIN — SODIUM CHLORIDE, POTASSIUM CHLORIDE, SODIUM LACTATE AND CALCIUM CHLORIDE: 600; 310; 30; 20 INJECTION, SOLUTION INTRAVENOUS at 07:43

## 2024-03-30 RX ADMIN — CEFTRIAXONE SODIUM 1000 MG: 10 INJECTION, POWDER, FOR SOLUTION INTRAVENOUS at 05:41

## 2024-03-30 ASSESSMENT — FIBROSIS 4 INDEX: FIB4 SCORE: 0.54

## 2024-03-30 ASSESSMENT — ENCOUNTER SYMPTOMS
GASTROINTESTINAL NEGATIVE: 1
RESPIRATORY NEGATIVE: 1
MUSCULOSKELETAL NEGATIVE: 1
NEUROLOGICAL NEGATIVE: 1
PSYCHIATRIC NEGATIVE: 1
CARDIOVASCULAR NEGATIVE: 1
CONSTITUTIONAL NEGATIVE: 1
EYES NEGATIVE: 1

## 2024-03-30 ASSESSMENT — PAIN DESCRIPTION - PAIN TYPE
TYPE: ACUTE PAIN
TYPE: ACUTE PAIN
TYPE: SURGICAL PAIN
TYPE: ACUTE PAIN

## 2024-03-30 NOTE — CARE PLAN
The patient is Stable - Low risk of patient condition declining or worsening    Shift Goals  Clinical Goals: Pt will report decrease in pain throughout shift  Patient Goals: sleep  Family Goals: mri results    Progress made toward(s) clinical / shift goals:    Problem: Pain - Standard  Goal: Alleviation of pain or a reduction in pain to the patient’s comfort goal  Outcome: Progressing   Pt expresses when in pain, is medicated per MAR.  Problem: Knowledge Deficit - Standard  Goal: Patient and family/care givers will demonstrate understanding of plan of care, disease process/condition, diagnostic tests and medications  Outcome: Progressing    Problem: Fall Risk  Goal: Patient will remain free from falls  Outcome: Progressing  Bed locked and in lowest position, educated on use of call light, call light and personal belongings within reach.      Patient is not progressing towards the following goals:

## 2024-03-30 NOTE — CARE PLAN
The patient is Stable - Low risk of patient condition declining or worsening        Problem: Pain - Standard  Goal: Alleviation of pain or a reduction in pain to the patient’s comfort goal  Outcome: Progressing     Problem: Knowledge Deficit - Standard  Goal: Patient and family/care givers will demonstrate understanding of plan of care, disease process/condition, diagnostic tests and medications  Outcome: Progressing     Shift Goals  Clinical Goals: Pain management, ambulate  Patient Goals: Rest and comfort  Family Goals: mri results    Progress made toward(s) clinical / shift goals:      Pt alert and oriented. VSS. ON room air now,  O2 sat 90-95%, no SOB. C/O pain during shift, pain medication given as ordered. On LR 83 ml/hr. OOB to bathroom, sitting up in chair and hallway, tolerated, reinforced education on fall precaution call light within reach, calls for help. Sister was at bedside updated to POC.

## 2024-03-30 NOTE — PROGRESS NOTES
Hospital Medicine Daily Progress Note    Date of Service  3/30/2024    Chief Complaint  Christi Mcgarry is a 33 y.o. female admitted 3/27/2024 with abd pain    Hospital Course  33 y.o. female who presented 3/27/2024 with abd pain.  PMH of Down syndrome, DELIA on CPAP, s/p gastric sleeve 12/2023 by Dr. Collins.  Comes in complaining of right upper quadrant abdominal pain that began while she was at work after drinking orange juice.  Pain has persisted and is not improving, associated nausea but no vomiting or bowel changes.  Denies fever/chills, no similar episodes in the past.     In the ED afebrile, hemodynamically stable.  Labs showed WBC count of 18, elevated LFTs, potassium 3.6.    3/28 MRCP without CBD dilation and noted for cholelithiasis without overt cholecystitis.  General surgery following for which patient underwent robotic cholecystectomy on 3/29/2024.    Interval Problem Update  ABD pain better with pain mgmt  Tolerating meals  Stable vitals  On room air  CBC with leukocytosis, likely secondary to recent surgery  LFTs higher today  IV hydration  IV ceftriaxone/metronidazole  General Surgery following  Up to chair for all meals  Patient to ambulate 4 times a day as tolerable  Labs on AM    I have discussed this patient's plan of care and discharge plan at IDT rounds today with Case Management, Nursing, Nursing leadership, and other members of the IDT team.    Consultants/Specialty  Surgery     Code Status  Full Code    Disposition  The patient is not medically cleared for discharge to home or a post-acute facility.  Anticipate discharge to: home with close outpatient follow-up    I have placed the appropriate orders for post-discharge needs.    Review of Systems  Review of Systems   Constitutional: Negative.    HENT: Negative.     Eyes: Negative.    Respiratory: Negative.     Cardiovascular: Negative.    Gastrointestinal: Negative.    Genitourinary: Negative.    Musculoskeletal: Negative.    Skin:  Negative.    Neurological: Negative.    Endo/Heme/Allergies: Negative.    Psychiatric/Behavioral: Negative.          Physical Exam  Temp:  [36.2 °C (97.1 °F)-36.7 °C (98.1 °F)] 36.2 °C (97.1 °F)  Pulse:  [63-84] 84  Resp:  [14-20] 18  BP: (118-153)/(51-88) 118/51  SpO2:  [80 %-100 %] 100 %    Physical Exam  Constitutional:       Appearance: Normal appearance.   HENT:      Head: Normocephalic and atraumatic.      Mouth/Throat:      Mouth: Mucous membranes are moist.   Eyes:      Extraocular Movements: Extraocular movements intact.      Pupils: Pupils are equal, round, and reactive to light.   Cardiovascular:      Rate and Rhythm: Normal rate and regular rhythm.      Pulses: Normal pulses.      Heart sounds: Normal heart sounds.   Pulmonary:      Effort: Pulmonary effort is normal.      Breath sounds: Normal breath sounds.   Abdominal:      General: Bowel sounds are normal.      Palpations: Abdomen is soft.      Tenderness: There is abdominal tenderness.   Musculoskeletal:         General: No swelling. Normal range of motion.      Cervical back: Normal range of motion and neck supple.   Skin:     General: Skin is warm.      Coloration: Skin is not jaundiced.   Neurological:      General: No focal deficit present.      Mental Status: She is alert and oriented to person, place, and time. Mental status is at baseline.      Cranial Nerves: No cranial nerve deficit.   Psychiatric:         Mood and Affect: Mood normal.         Behavior: Behavior normal.         Thought Content: Thought content normal.         Judgment: Judgment normal.         Fluids    Intake/Output Summary (Last 24 hours) at 3/30/2024 0720  Last data filed at 3/29/2024 1226  Gross per 24 hour   Intake 750 ml   Output 50 ml   Net 700 ml         Laboratory  Recent Labs     03/28/24  0331 03/29/24  0256 03/30/24  0529   WBC 10.6 8.5 16.8*   RBC 4.88 4.85 5.28   HEMOGLOBIN 12.7 12.3 13.5   HEMATOCRIT 40.0 39.0 42.1   MCV 82.0 80.4* 79.7*   MCH 26.0* 25.4*  25.6*   MCHC 31.8* 31.5* 32.1*   RDW 47.3 46.7 45.4   PLATELETCT 343 304 366   MPV 10.5 10.3 10.4     Recent Labs     03/28/24  0331 03/29/24  0256 03/30/24  0529   SODIUM 141 140 138   POTASSIUM 4.3 3.8 4.3   CHLORIDE 109 107 103   CO2 23 20 23   GLUCOSE 122* 88 117*   BUN 6* 5* 5*   CREATININE 0.46* 0.42* 0.44*   CALCIUM 8.4* 8.4* 8.9                   Imaging  [id number]   Final Result      1.  CBD is around 5 to 6 mm transverse on this study. It does not appear dilated on this study. No obvious choledocholithiasis is visible although the study is fairly degraded.   2.  Cholelithiasis. There is not overt cholecystitis visible here. If there is concern for cholecystitis, HIDA scan could be performed for definitive assessment.      US-RUQ   Final Result         1.  Cholelithiasis and gallbladder sludge without additional sonographic findings of acute cholecystitis.   2.  Common bile duct dilatation, consider causes of biliary obstruction with additional workup as clinically appropriate.   3.  Echogenic liver, compatible with fatty change versus fibrosis.      CT-ABDOMEN-PELVIS WITH   Final Result      1.  Question of faint pericholecystic fat stranding. Recommend right upper quadrant ultrasound for further workup.           Assessment/Plan  * Right upper quadrant abdominal pain- (present on admission)  Assessment & Plan  Right upper quadrant abdominal pain that began after drinking juice earlier today, no similar episodes in the past  Rodriguez sign positive on exam  Labs showed elevated LFTs and leukocytosis.  No fever/chills  CT scan of the abdomen reviewed and shows possible pericholecystic fat stranding  Ultrasound reviewed and shows gallbladder stones and sludge but no obvious inflammation.  She does have dilated CBD  EDP discussed with patient's surgeon, Dr. Collins recommends MRCP   n.p.o., IV fluids, pain management, MRCP ordered      Hypokalemia- (present on admission)  Assessment & Plan  Potassium 3.6  on presentation.  Replete as needed check magnesium levels.  BMP ordered continue monitoring    Class 3 severe obesity due to excess calories without serious comorbidity with body mass index (BMI) of 50.0 to 59.9 in adult (HCC)- (present on admission)  Assessment & Plan  BMI 50.18.  Recent gastric sleeve    Obstructive sleep apnea syndrome- (present on admission)  Assessment & Plan  Nocturnal CPAP         VTE prophylaxis: Prophylactic Lovenox    I have performed a physical exam and reviewed and updated ROS and Plan today (3/30/2024). In review of yesterday's note (3/29/2024), there are no changes except as documented above.

## 2024-03-30 NOTE — CARE PLAN
The patient is Stable - Low risk of patient condition declining or worsening    Shift Goals  Clinical Goals: cholecysectomy  Patient Goals: comfort and pain control  Family Goals: mri results    Progress made toward(s) clinical / shift goals:    Problem: Pain - Standard  Goal: Alleviation of pain or a reduction in pain to the patient’s comfort goal  Outcome: Progressing     Problem: Knowledge Deficit - Standard  Goal: Patient and family/care givers will demonstrate understanding of plan of care, disease process/condition, diagnostic tests and medications  Outcome: Progressing     Problem: Fall Risk  Goal: Patient will remain free from falls  Outcome: Progressing       Patient is not progressing towards the following goals:

## 2024-03-31 VITALS
TEMPERATURE: 98.2 F | HEART RATE: 69 BPM | SYSTOLIC BLOOD PRESSURE: 139 MMHG | BODY MASS INDEX: 48.06 KG/M2 | WEIGHT: 207.67 LBS | DIASTOLIC BLOOD PRESSURE: 88 MMHG | RESPIRATION RATE: 18 BRPM | OXYGEN SATURATION: 94 % | HEIGHT: 55 IN

## 2024-03-31 LAB
ALBUMIN SERPL BCP-MCNC: 3.6 G/DL (ref 3.2–4.9)
ALBUMIN/GLOB SERPL: 1.4 G/DL
ALP SERPL-CCNC: 138 U/L (ref 30–99)
ALT SERPL-CCNC: 59 U/L (ref 2–50)
ANION GAP SERPL CALC-SCNC: 11 MMOL/L (ref 7–16)
AST SERPL-CCNC: 24 U/L (ref 12–45)
BASOPHILS # BLD AUTO: 0.4 % (ref 0–1.8)
BASOPHILS # BLD: 0.05 K/UL (ref 0–0.12)
BILIRUB SERPL-MCNC: 0.2 MG/DL (ref 0.1–1.5)
BUN SERPL-MCNC: 7 MG/DL (ref 8–22)
CALCIUM ALBUM COR SERPL-MCNC: 8.8 MG/DL (ref 8.5–10.5)
CALCIUM SERPL-MCNC: 8.5 MG/DL (ref 8.5–10.5)
CHLORIDE SERPL-SCNC: 103 MMOL/L (ref 96–112)
CO2 SERPL-SCNC: 25 MMOL/L (ref 20–33)
CREAT SERPL-MCNC: 0.53 MG/DL (ref 0.5–1.4)
EOSINOPHIL # BLD AUTO: 0.07 K/UL (ref 0–0.51)
EOSINOPHIL NFR BLD: 0.5 % (ref 0–6.9)
ERYTHROCYTE [DISTWIDTH] IN BLOOD BY AUTOMATED COUNT: 46.1 FL (ref 35.9–50)
GFR SERPLBLD CREATININE-BSD FMLA CKD-EPI: 126 ML/MIN/1.73 M 2
GLOBULIN SER CALC-MCNC: 2.6 G/DL (ref 1.9–3.5)
GLUCOSE SERPL-MCNC: 107 MG/DL (ref 65–99)
HCT VFR BLD AUTO: 40 % (ref 37–47)
HGB BLD-MCNC: 12.9 G/DL (ref 12–16)
IMM GRANULOCYTES # BLD AUTO: 0.08 K/UL (ref 0–0.11)
IMM GRANULOCYTES NFR BLD AUTO: 0.6 % (ref 0–0.9)
LYMPHOCYTES # BLD AUTO: 3.07 K/UL (ref 1–4.8)
LYMPHOCYTES NFR BLD: 23.4 % (ref 22–41)
MCH RBC QN AUTO: 25.6 PG (ref 27–33)
MCHC RBC AUTO-ENTMCNC: 32.3 G/DL (ref 32.2–35.5)
MCV RBC AUTO: 79.5 FL (ref 81.4–97.8)
MONOCYTES # BLD AUTO: 0.87 K/UL (ref 0–0.85)
MONOCYTES NFR BLD AUTO: 6.6 % (ref 0–13.4)
NEUTROPHILS # BLD AUTO: 8.98 K/UL (ref 1.82–7.42)
NEUTROPHILS NFR BLD: 68.5 % (ref 44–72)
NRBC # BLD AUTO: 0 K/UL
NRBC BLD-RTO: 0 /100 WBC (ref 0–0.2)
PLATELET # BLD AUTO: 342 K/UL (ref 164–446)
PMV BLD AUTO: 10.9 FL (ref 9–12.9)
POTASSIUM SERPL-SCNC: 3.6 MMOL/L (ref 3.6–5.5)
PROT SERPL-MCNC: 6.2 G/DL (ref 6–8.2)
RBC # BLD AUTO: 5.03 M/UL (ref 4.2–5.4)
SODIUM SERPL-SCNC: 139 MMOL/L (ref 135–145)
WBC # BLD AUTO: 13.1 K/UL (ref 4.8–10.8)

## 2024-03-31 PROCEDURE — 700102 HCHG RX REV CODE 250 W/ 637 OVERRIDE(OP): Performed by: STUDENT IN AN ORGANIZED HEALTH CARE EDUCATION/TRAINING PROGRAM

## 2024-03-31 PROCEDURE — 85025 COMPLETE CBC W/AUTO DIFF WBC: CPT

## 2024-03-31 PROCEDURE — 700111 HCHG RX REV CODE 636 W/ 250 OVERRIDE (IP): Performed by: STUDENT IN AN ORGANIZED HEALTH CARE EDUCATION/TRAINING PROGRAM

## 2024-03-31 PROCEDURE — 700111 HCHG RX REV CODE 636 W/ 250 OVERRIDE (IP): Mod: JZ | Performed by: STUDENT IN AN ORGANIZED HEALTH CARE EDUCATION/TRAINING PROGRAM

## 2024-03-31 PROCEDURE — 700101 HCHG RX REV CODE 250: Performed by: STUDENT IN AN ORGANIZED HEALTH CARE EDUCATION/TRAINING PROGRAM

## 2024-03-31 PROCEDURE — 99239 HOSP IP/OBS DSCHRG MGMT >30: CPT | Performed by: STUDENT IN AN ORGANIZED HEALTH CARE EDUCATION/TRAINING PROGRAM

## 2024-03-31 PROCEDURE — 80053 COMPREHEN METABOLIC PANEL: CPT

## 2024-03-31 PROCEDURE — 36415 COLL VENOUS BLD VENIPUNCTURE: CPT

## 2024-03-31 PROCEDURE — A9270 NON-COVERED ITEM OR SERVICE: HCPCS | Performed by: STUDENT IN AN ORGANIZED HEALTH CARE EDUCATION/TRAINING PROGRAM

## 2024-03-31 RX ORDER — IBUPROFEN 800 MG/1
800 TABLET ORAL EVERY 8 HOURS PRN
Qty: 9 TABLET | Refills: 0 | Status: SHIPPED | OUTPATIENT
Start: 2024-03-31 | End: 2024-04-03

## 2024-03-31 RX ADMIN — METRONIDAZOLE 500 MG: 5 INJECTION, SOLUTION INTRAVENOUS at 05:20

## 2024-03-31 RX ADMIN — ONDANSETRON 4 MG: 2 INJECTION INTRAMUSCULAR; INTRAVENOUS at 09:30

## 2024-03-31 RX ADMIN — CEFTRIAXONE SODIUM 1000 MG: 10 INJECTION, POWDER, FOR SOLUTION INTRAVENOUS at 05:07

## 2024-03-31 RX ADMIN — ENOXAPARIN SODIUM 60 MG: 100 INJECTION SUBCUTANEOUS at 05:22

## 2024-03-31 RX ADMIN — OMEPRAZOLE 20 MG: 20 CAPSULE, DELAYED RELEASE ORAL at 05:22

## 2024-03-31 ASSESSMENT — PAIN DESCRIPTION - PAIN TYPE: TYPE: ACUTE PAIN

## 2024-03-31 NOTE — DISCHARGE SUMMARY
Discharge Summary    CHIEF COMPLAINT ON ADMISSION  Chief Complaint   Patient presents with    Abdominal Pain     Pt reports mid abdominal pain starting today. Pt reports having a gastric by pass in December.        Reason for Admission  Abd Pain     Admission Date  3/27/2024    CODE STATUS  Full Code    HPI & HOSPITAL COURSE  33 y.o. female with past medical history of Down syndrome, DELIA on CPAP, morbid obesity status post gastric sleeve on 12/2023 by Dr. Collins was admitted on 3/27/2024 for abdominal pain secondary to cholelithiasis and cholecystitis.  She was started on broad-spectrum antibiotics and IV hydration. 3/28 MRCP without CBD dilation and noted for cholelithiasis without overt cholecystitis.  General surgery following for which patient underwent robotic cholecystectomy on 3/29/2024 which she tolerated well.  Patient tolerating meals and ambulating without difficulties.  She completed antibiotic regimen while inpatient.  Stable patient with in her condition was discharged home and instructed to follow-up with her primary care provider and surgeon in the outpatient setting.  All results and plan of action discussed with the patient and family for which they voiced understanding and agreement with the primary care team. Family was instructed to return to emergency department symptoms were to worsen.     Therefore, she is discharged in good and stable condition to home with close outpatient follow-up.    The patient met 2-midnight criteria for an inpatient stay at the time of discharge.    Discharge Date  3/31/2024    FOLLOW UP ITEMS POST DISCHARGE  Primary care provider follow posthospital discharge care  General surgery to follow post cholecystectomy care    DISCHARGE DIAGNOSES  Principal Problem:    Right upper quadrant abdominal pain (POA: Yes)  Active Problems:    Obstructive sleep apnea syndrome (POA: Yes)    Class 3 severe obesity due to excess calories without serious comorbidity with body mass  index (BMI) of 50.0 to 59.9 in adult (HCC) (POA: Yes)    Hypokalemia (POA: Yes)  Resolved Problems:    * No resolved hospital problems. *      FOLLOW UP  No future appointments.  No follow-up provider specified.    MEDICATIONS ON DISCHARGE     Medication List        CONTINUE taking these medications        Instructions   omeprazole 40 MG delayed-release capsule  Commonly known as: PriLOSEC   1 capsule 30 minutes before morning meal Orally Once a day for 30 days     POTASSIUM PO   Take 1 Tablet by mouth every day.  Dose: 1 Tablet     VITAMIN B-12 PO   Take 1 Tablet by mouth every day.  Dose: 1 Tablet              Allergies  No Known Allergies    DIET  Orders Placed This Encounter   Procedures    Diet Order Diet: Regular; Nutrient modifications: (optional): Low Fat; Miscellaneous modifications: (optional): 6 Small Meals; Tray Modifications (optional): No Straws, Small Meals     Standing Status:   Standing     Number of Occurrences:   1     Order Specific Question:   Diet:     Answer:   Regular [1]     Order Specific Question:   Nutrient modifications: (optional)     Answer:   Low Fat [5]     Order Specific Question:   Miscellaneous modifications: (optional)     Answer:   6 Small Meals [4]     Order Specific Question:   Tray Modifications (optional)     Answer:   No Straws     Order Specific Question:   Tray Modifications (optional)     Answer:   Small Meals       ACTIVITY  As tolerated.  Weight bearing as tolerated    CONSULTATIONS  General surgery    PROCEDURES  Robotic cholecystectomy    LABORATORY  Lab Results   Component Value Date    SODIUM 139 03/31/2024    POTASSIUM 3.6 03/31/2024    CHLORIDE 103 03/31/2024    CO2 25 03/31/2024    GLUCOSE 107 (H) 03/31/2024    BUN 7 (L) 03/31/2024    CREATININE 0.53 03/31/2024        Lab Results   Component Value Date    WBC 13.1 (H) 03/31/2024    HEMOGLOBIN 12.9 03/31/2024    HEMATOCRIT 40.0 03/31/2024    PLATELETCT 342 03/31/2024        Total time of the discharge process  exceeds 33 minutes.

## 2024-03-31 NOTE — CARE PLAN
The patient is Stable - Low risk of patient condition declining or worsening    Shift Goals  Clinical Goals: Pt will have pain controlled throughout shift  Patient Goals: rest, comfort  Family Goals: mri results    Progress made toward(s) clinical / shift goals:    Pt complains of pain, medicated per MAR. Pt out of bed to restroom, remains free of falls. Family at bedside. Call light in reach, bed locked and in lowest position. Pt resting quietly at this time.    Problem: Pain - Standard  Goal: Alleviation of pain or a reduction in pain to the patient’s comfort goal  3/31/2024 0224 by Edmund Obregon R.N.  Outcome: Progressing    Problem: Fall Risk  Goal: Patient will remain free from falls  Outcome: Progressing       Patient is not progressing towards the following goals:

## 2024-03-31 NOTE — DISCHARGE INSTRUCTIONS
[facility]  Discharge Instructions for Gallbladder Surgery      You had surgery to treat your gallbladder disease. Gallbladder infection is a condition that is caused by impacted gallstones, leading to the infection of bile within the gallbladder. Impacted gallstone can also cause chronic pain in your upper abdomen, as well as, other associated symptoms (such as nausea, bloating, etc).    Your surgery was done by making four small incisions on your upper abdomen and using a laparoscope (a thin tube with a tiny camera on the end) and/or robotic technology. If conditions required it - a drain was left in the area where your gallbladder was located, in order to help evacuate residual fluid.    Now that you're going home, be sure to follow your surgeon's instructions on how to take care of yourself.    When You're in the Hospital     The nurse will give you pain medicine and help you begin to move around. Rest and gentle movement are important for recovery.     You may go home the same day as surgery. Or the hospital stay may be 1 to 2 days. It will depend on the exact procedure that was done.    What to Expect at Home    Most people go back to work in 1-2 weeks after laparoscopic surgery and are able to participate in all strenuous physical activity 4 weeks after surgery.     If there are visible stitches on the skin, they will need to be removed at a follow-up visit with the surgeon. If stitches under the skin were used, they will dissolve on their own. The incisions are covered with a bandage or with a liquid adhesive (skin glue).     You may have pain, soreness, and stiffness at first, especially when moving about. This is normal.     You will also feel tired after surgery. This can last for a few weeks.     You will most likely return to normal activities in just a few weeks.     There may be some bruising around the incisions.      Make sure you get plenty of rest the first 2 to 3 days after going  home. Ask family and friends for help with daily activities while your movements are limited.     Managing Pain     Use any pain medicines as instructed by the surgeon or nurse. You may be given a prescription for a narcotic pain medicine. Over-the-counter pain medicine (ibuprofen, acetaminophen) can be used if the narcotic medicine is too strong.     Wound Care     There may be a bandage over the incision. Follow the surgeon's instructions for how long to leave it on and when to change it. If skin glue was used, a bandage may not have been used.     A little bleeding and drainage is normal for the first few days. Apply antibiotic ointment (bacitracin, polysporin) or another solution to the incision area if the surgeon or nurse told you to.     Wash the area with mild soap and water when the surgeon says it is OK to do so. Gently pat it dry. Do not take a bath, soak in a hot tub, or go swimming for the first week after surgery.     Diet During Recovery     Pain medicines can cause constipation. Eating some high-fiber foods and drinking plenty of water can help keep the bowels moving. Use over the counter fiber products if constipation does not improve.     Antibiotics can cause diarrhea. If this happens, try eating yogurt with live cultures or taking psyllium (Metamucil). Call the surgeon if the diarrhea does not get better.    In general, avoid fatty or greasy foods for one month after this operation. Eating fatty/greasy foods will potentially cause diarrhea or loose bowel movements.     Daily Activity for Adults     Give yourself time to heal. You may gradually resume normal activities, such as walking, driving, and sexual activity, when you are ready. But you probably will not feel like doing anything strenuous for up to 4 weeks.     Do not drive if you are taking narcotic pain medicines.     Do not lift anything over 10 pounds (about a gallon of milk) for 4 weeks, or until your doctor tells you it is OK. If  possible avoid doing any activity that causes pain, or pulls on the area of surgery.      Check with the surgeon before returning to sports or other high-impact activities. Protect the incision area from the sun for 1 year to prevent noticeable scarring.     When to Call the Doctor     Call the surgeon if you have any of the following:     Difficulty breathing or chest pain (CALL 911)  Light headedness that does not go away after a few days  Severe pain or soreness that does not improve over time  Incisions are bleeding, red or warm to the touch, or have a thick, yellow, green, or pus-like drainage  Chills or fever of 101°F (38.3°C), or higher  Trouble urinating or constipation that is not relieved with over the counter laxatives     Follow up Instructions:     Please call the [facility] (Dr. Collins's office) at [phone] to schedule your follow up appointment in 2 weeks.    Thank you for this opportunity to provide excellent surgical care for you today.

## 2024-04-01 ENCOUNTER — TELEPHONE (OUTPATIENT)
Dept: HEALTH INFORMATION MANAGEMENT | Facility: OTHER | Age: 34
End: 2024-04-01
Payer: MEDICAID

## 2024-09-03 ENCOUNTER — OFFICE VISIT (OUTPATIENT)
Dept: INTERNAL MEDICINE | Facility: OTHER | Age: 34
End: 2024-09-03
Payer: MEDICAID

## 2024-09-03 VITALS
DIASTOLIC BLOOD PRESSURE: 75 MMHG | OXYGEN SATURATION: 99 % | TEMPERATURE: 97.2 F | HEART RATE: 65 BPM | SYSTOLIC BLOOD PRESSURE: 127 MMHG | WEIGHT: 180.8 LBS | HEIGHT: 56 IN | BODY MASS INDEX: 40.67 KG/M2

## 2024-09-03 DIAGNOSIS — G47.33 OBSTRUCTIVE SLEEP APNEA SYNDROME: ICD-10-CM

## 2024-09-03 DIAGNOSIS — N92.1 MENORRHAGIA WITH IRREGULAR CYCLE: ICD-10-CM

## 2024-09-03 DIAGNOSIS — Q90.9 DOWN SYNDROME: ICD-10-CM

## 2024-09-03 DIAGNOSIS — G47.00 INSOMNIA, UNSPECIFIED TYPE: ICD-10-CM

## 2024-09-03 DIAGNOSIS — E66.01 MORBID OBESITY WITH BMI OF 40.0-44.9, ADULT (HCC): ICD-10-CM

## 2024-09-03 DIAGNOSIS — R73.03 PREDIABETES: ICD-10-CM

## 2024-09-03 DIAGNOSIS — R11.11 VOMITING WITHOUT NAUSEA, UNSPECIFIED VOMITING TYPE: ICD-10-CM

## 2024-09-03 DIAGNOSIS — B35.3 TINEA PEDIS OF RIGHT FOOT: ICD-10-CM

## 2024-09-03 PROCEDURE — 99214 OFFICE O/P EST MOD 30 MIN: CPT | Mod: GC

## 2024-09-03 RX ORDER — CLOTRIMAZOLE 1 %
1 CREAM (GRAM) TOPICAL 2 TIMES DAILY
Qty: 1 EACH | Refills: 0 | Status: SHIPPED | OUTPATIENT
Start: 2024-09-03 | End: 2024-09-18

## 2024-09-03 RX ORDER — MELATONIN 5 MG
20 TABLET,CHEWABLE ORAL ONCE
COMMUNITY

## 2024-09-03 ASSESSMENT — ENCOUNTER SYMPTOMS
WHEEZING: 0
PALPITATIONS: 0
DEPRESSION: 0
VOMITING: 1
INSOMNIA: 0
CHILLS: 0
EYE PAIN: 0
WEIGHT LOSS: 0
SORE THROAT: 0
FOCAL WEAKNESS: 0
DIARRHEA: 0
NERVOUS/ANXIOUS: 0
HEARTBURN: 0
EYE REDNESS: 0
FEVER: 0
ABDOMINAL PAIN: 0
SEIZURES: 0
DOUBLE VISION: 0
MEMORY LOSS: 0
ORTHOPNEA: 0
WEAKNESS: 0
FALLS: 0
SHORTNESS OF BREATH: 0
MYALGIAS: 0
PND: 0
COUGH: 0
BRUISES/BLEEDS EASILY: 0
CONSTIPATION: 0
BLURRED VISION: 0
DIZZINESS: 0
HALLUCINATIONS: 0
HEADACHES: 0
SINUS PAIN: 0
TREMORS: 0

## 2024-09-03 ASSESSMENT — PAIN SCALES - GENERAL: PAINLEVEL: NO PAIN

## 2024-09-03 ASSESSMENT — FIBROSIS 4 INDEX: FIB4 SCORE: 0.29

## 2024-09-03 NOTE — PROGRESS NOTES
Established Patient    Patient Care Team:  Serena Clemons M.D. as PCP - General (Internal Medicine)  Preferred home care  as Respiratory Therapist (DME Supplier)    HPI:  The patient is status post gastric sleeve surgery (robotic sleeve gastrectomy) performed in December 2023 and status post cholecystectomy in March 2024. Her medical history is significant for prediabetes, obstructive sleep apnea (DELIA) managed with CPAP, and gastroesophageal reflux disease (GERD). She presents to the clinic today accompanied by her aunt, Marietta Rhodes, for follow-up regarding postprandial vomiting.    The patient was initially referred for bariatric surgery due to morbid obesity. Her current BMI dropped from >50s to 41.13. She is adhering to a strict portion-controlled diet with the support of her aunt. The patient reports experiencing postprandial vomiting 2-3 times per week, although she notes a reduction in frequency compared to the immediate postoperative period when vomiting was more frequent. She describes a sensation of food being stuck in her chest but maintains adherence to small portion sizes. The patient was previously evaluated in this clinic in March for similar symptoms, though the vomiting persists. She denies experiencing fevers, chills, palpitations, nausea, abdominal pain, hematemesis, constipation, or diarrhea.    Additionally, the patient reports increased menstrual bleeding for several months, a symptom that predates her gastric surgery. She denies dysmenorrhea or other menstrual abnormalities.    Review of Systems   Constitutional:  Negative for chills, fever and weight loss.   HENT:  Negative for hearing loss, sinus pain, sore throat and tinnitus.    Eyes:  Negative for blurred vision, double vision, pain and redness.   Respiratory:  Negative for cough, shortness of breath and wheezing.    Cardiovascular:  Negative for chest pain, palpitations, orthopnea, leg swelling and PND.   Gastrointestinal:  Positive  for vomiting. Negative for abdominal pain, constipation, diarrhea and heartburn.   Genitourinary:  Negative for dysuria and hematuria.   Musculoskeletal:  Negative for falls, joint pain and myalgias.   Skin:  Positive for itching. Negative for rash.        Reports skin itchiness of the sole and interdigital area of her right foot   Neurological:  Negative for dizziness, tremors, focal weakness, seizures, weakness and headaches.   Endo/Heme/Allergies:  Does not bruise/bleed easily.   Psychiatric/Behavioral:  Negative for depression, hallucinations and memory loss. The patient is not nervous/anxious and does not have insomnia.    :    Past Medical History:   Diagnosis Date    ADHD (attention deficit hyperactivity disorder)     Anxiety     Chickenpox     Depression     Pt lost mother October 2023    Gastroesophageal reflux disease without esophagitis     Headache 05/25/2016    Insomnia 05/25/2016    Learning disabilities     unable read or write    Morbid obesity (HCC) 05/25/2016    Nocturnal enuresis 05/25/2016    Obesity     Odynophagia     DELIA (obstructive sleep apnea) 05/25/2016    Prediabetes 05/25/2016    Sleep apnea     UTI (lower urinary tract infection) 05/25/2016       Social History     Tobacco Use    Smoking status: Never    Smokeless tobacco: Never   Vaping Use    Vaping status: Never Used   Substance Use Topics    Alcohol use: No    Drug use: No     Comment: pt denies       Current Outpatient Medications   Medication Sig Dispense Refill    Melatonin 5 MG Chew Tab Chew 20 mg one time.      clotrimazole (LOTRIMIN) 1 % Cream Apply 1 Application topically 2 times a day for 15 days. 1 Each 0    omeprazole (PRILOSEC) 40 MG delayed-release capsule 1 capsule 30 minutes before morning meal Orally Once a day for 30 days      POTASSIUM PO Take 1 Tablet by mouth every day.      Cyanocobalamin (VITAMIN B-12 PO) Take 1 Tablet by mouth every day.       No current facility-administered medications for this visit.  "      /75 (BP Location: Right arm, Patient Position: Sitting, BP Cuff Size: Adult)   Pulse 65   Temp 36.2 °C (97.2 °F) (Temporal)   Ht 1.412 m (4' 7.59\")   Wt 82 kg (180 lb 12.8 oz)   LMP 08/29/2024   SpO2 99%   Breastfeeding No   BMI 41.13 kg/m²   Physical Exam  Constitutional:       General: She is not in acute distress.     Appearance: Normal appearance.   HENT:      Head: Normocephalic.      Right Ear: Tympanic membrane normal.      Left Ear: Tympanic membrane normal.      Nose: Nose normal. No congestion or rhinorrhea.      Mouth/Throat:      Mouth: Mucous membranes are moist.   Eyes:      General: No scleral icterus.     Extraocular Movements: Extraocular movements intact.      Conjunctiva/sclera: Conjunctivae normal.      Pupils: Pupils are equal, round, and reactive to light.   Cardiovascular:      Rate and Rhythm: Normal rate and regular rhythm.      Pulses: Normal pulses.      Heart sounds: Normal heart sounds. No murmur heard.     No gallop.   Pulmonary:      Effort: Pulmonary effort is normal. No respiratory distress.      Breath sounds: Normal breath sounds. No wheezing, rhonchi or rales.   Chest:      Chest wall: No tenderness.   Abdominal:      General: Bowel sounds are normal. There is no distension.      Palpations: There is no mass.      Tenderness: There is no abdominal tenderness. There is no guarding or rebound.   Genitourinary:     Rectum: Normal.   Musculoskeletal:         General: No swelling or deformity. Normal range of motion.      Cervical back: Normal range of motion and neck supple.      Right lower leg: No edema.      Left lower leg: No edema.   Lymphadenopathy:      Cervical: No cervical adenopathy.   Skin:     General: Skin is warm.      Capillary Refill: Capillary refill takes less than 2 seconds.      Coloration: Skin is not jaundiced or pale.      Findings: No bruising, erythema or rash.      Comments: Desquamative area on her right sole and interdigital area of the " right foot   Neurological:      General: No focal deficit present.      Mental Status: She is alert and oriented to person, place, and time.      Sensory: No sensory deficit.      Motor: No weakness.      Gait: Gait normal.      Deep Tendon Reflexes: Reflexes normal.   Psychiatric:         Mood and Affect: Mood normal.         Behavior: Behavior normal.         Thought Content: Thought content normal.         Judgment: Judgment normal.           Assessment and Plan:     1. Postprandial vomits without nausea    Patient again presents with persistent symptoms of postprandial vomits.  However she reports frequency has reduced compared to immediate postoperative period.  Given the persistency of the symptoms consider to rule out any postsurgical stricture or narrowing of upper GI tract.  Also other possible causes might be fast eating vs inadequate meal portions, given the patient has Down syndrome is likely she does not follow recommendations.  The patient has an appointment this month with surgeon to evaluate the persistency of postprandial vomiting.    -Follow-up with surgery.      2.Prediabetes  Last glucose on March 2024 of 107.  Patient has presented improvement in fasting glucose throughout the months after gastric bypass well controlled without the need of medications.  Will evaluate fasting glucose next visit with results.    - Released consent to get lab test from her surgeon      3. Obstructive sleep apnea syndrome  Patient reports feeling well rested after hours of sleep using CPAP.     -Continue CPAP    4. Insomnia, unspecified type  Well-controlled with melatonin.    -Continue melatonin 20 mg every evening    5. #Morbid obesity with BMI of 40.0-44.9, adult (HCC)      #Status post bariatric surgery  The patient presents excellent improvement of weight and BMI over the months after bariatric surgery.  We will evaluate any hypovitaminosis with results taken by her surgeon. Currently pt is taking vit  B12.    -Follow-up with surgery  -Follow-up with results  -Continue vit B12    6. Menorrhagia with irregular cycle.  Patient reports increase menstrual bleeding since a long period of time even before bariatric surgery.  Given her increased BMI and morbid obesity this could be related to hormonal imbalances and insulin resistance. Will rule out polycystic ovarian syndrome.    - TSH+FREE T4  - US-OB TRANSVAGINAL ONLY; Future    7. Tinea pedis of right foot  - clotrimazole (LOTRIMIN) 1 % Cream; Apply 1 Application topically 2 times a day for 15 days.  Dispense: 1 Each; Refill: 0           Return in about 6 weeks (around 10/15/2024).      Quincy Howell M.D., MPH. PGY-1 Internal Medicine  [facility]    This note was created using voice recognition software.  While every attempt is made to ensure accuracy of transcription, occasionally errors occur.

## 2024-09-04 ENCOUNTER — TELEPHONE (OUTPATIENT)
Dept: INTERNAL MEDICINE | Facility: OTHER | Age: 34
End: 2024-09-04
Payer: MEDICAID

## 2024-09-04 DIAGNOSIS — R11.11 VOMITING WITHOUT NAUSEA, UNSPECIFIED VOMITING TYPE: ICD-10-CM

## 2024-09-04 NOTE — TELEPHONE ENCOUNTER
After discussion with attending (Dr. Garcia) and based on her repetitive vomits and past hx of hypokalemia we added a CMP test to evaluate the patient's electrolytes.    I discussed this directly with the patient and let her know I added this lab test to her current labs and the order is active.

## 2024-09-09 ENCOUNTER — TELEPHONE (OUTPATIENT)
Dept: INTERNAL MEDICINE | Facility: OTHER | Age: 34
End: 2024-09-09
Payer: MEDICAID

## 2024-09-09 NOTE — TELEPHONE ENCOUNTER
Caller Name: Christi Rhodes  Call Back Number: [phone]    How would the patient prefer to be contacted with a response: Phone call OK to leave a detailed message    Patient and her care taker called to speak to Dr. Teresa De La O. [place], the patient's care taker states she missed a call from Dr. Moore where he was trying to explain what tests were ordered for the patient and what they were for. The caretaker is now requesting a call back to discuss these tests with Dr. Moore since she was not able to answer when he called.

## 2024-11-08 ENCOUNTER — APPOINTMENT (OUTPATIENT)
Dept: ADMISSIONS | Facility: MEDICAL CENTER | Age: 34
End: 2024-11-08
Payer: MEDICAID

## 2024-11-26 ENCOUNTER — OFFICE VISIT (OUTPATIENT)
Dept: URGENT CARE | Facility: CLINIC | Age: 34
End: 2024-11-26
Payer: MEDICAID

## 2024-11-26 VITALS
WEIGHT: 168.2 LBS | DIASTOLIC BLOOD PRESSURE: 84 MMHG | HEIGHT: 55 IN | TEMPERATURE: 98.9 F | SYSTOLIC BLOOD PRESSURE: 120 MMHG | RESPIRATION RATE: 16 BRPM | OXYGEN SATURATION: 98 % | HEART RATE: 82 BPM | BODY MASS INDEX: 38.92 KG/M2

## 2024-11-26 DIAGNOSIS — S16.1XXA STRAIN OF NECK MUSCLE, INITIAL ENCOUNTER: ICD-10-CM

## 2024-11-26 RX ORDER — CYCLOBENZAPRINE HCL 5 MG
5 TABLET ORAL
Qty: 15 TABLET | Refills: 0 | Status: SHIPPED | OUTPATIENT
Start: 2024-11-26

## 2024-11-26 ASSESSMENT — ENCOUNTER SYMPTOMS
NECK PAIN: 1
MYALGIAS: 1

## 2024-11-26 ASSESSMENT — FIBROSIS 4 INDEX: FIB4 SCORE: 0.3

## 2024-11-26 NOTE — LETTER
November 26, 2024         Patient: Christi Mcgarry   YOB: 1990   Date of Visit: 11/26/2024           To Whom it May Concern:    Christi Mcgarry was seen in my clinic on 11/26/2024. She may return to work in 3 days, excuse yesterday and today from work.    If you have any questions or concerns, please don't hesitate to call.        Sincerely,           Poli Hernandez M.D.  Electronically Signed

## 2024-11-27 ENCOUNTER — APPOINTMENT (OUTPATIENT)
Dept: ADMISSIONS | Facility: MEDICAL CENTER | Age: 34
End: 2024-11-27
Attending: COLON & RECTAL SURGERY
Payer: MEDICAID

## 2024-11-27 NOTE — PROGRESS NOTES
Subjective     Christi Mcgarry is a 34 y.o. female who presents with Shoulder Pain (Upper right shoulder and neck pain. Patient states that this started yesterday. )    This is a  new problem with uncertain prognosis:   This is a very pleasant 34 y.o. who has come to the walk-in clinic today for pain in the right side of neck and trapezius area.  Woke up yesterday with it.  Pain worse with movement and position.  Better with rest and maybe a little bit of Motrin and Tylenol.  No known injury or cause.  No trauma.  Nonradiating no limb weakness          ALLERGIES:  Patient has no known allergies.     PMH:  Past Medical History:   Diagnosis Date    ADHD (attention deficit hyperactivity disorder)     Anxiety     Chickenpox     Depression     Pt lost mother October 2023    Gastroesophageal reflux disease without esophagitis     Headache 05/25/2016    Insomnia 05/25/2016    Learning disabilities     unable read or write    Morbid obesity (HCC) 05/25/2016    Nocturnal enuresis 05/25/2016    Obesity     Odynophagia     DELIA (obstructive sleep apnea) 05/25/2016    Prediabetes 05/25/2016    Sleep apnea     UTI (lower urinary tract infection) 05/25/2016        PSH:  Past Surgical History:   Procedure Laterality Date    CHOLECYSTECTOMY ROBOTIC XI N/A 3/29/2024    Procedure: CHOLECYSTECTOMY, ROBOT-ASSISTED, USING DA IVAN XI;  Surgeon: Bill Collins M.D.;  Location: SURGERY [facility];  Service: Gen Robotic    HI LAP DAVID RESTRICT PROC LONGITUDINAL GAS* N/A 12/22/2023    Procedure: ROBOTIC SLEEVE GASTRECTOMY;  Surgeon: Bill Collins M.D.;  Location: SURGERY [facility];  Service: Gen Robotic    LIVER BIOPSY N/A 12/22/2023    Procedure: BIOPSY, LIVER;  Surgeon: Bill Collins M.D.;  Location: SURGERY [facility];  Service: Gen Robotic    TONSILLECTOMY AND ADENOIDECTOMY  4/15/2013    Performed by Nadeem Briceno M.D. [facility]:    Current Outpatient Medications:      "cyclobenzaprine (FLEXERIL) 5 mg tablet, Take 1 Tablet by mouth every evening as needed for Muscle Spasms or Mild Pain., Disp: 15 Tablet, Rfl: 0    ** I have documented what I find to be significant in regards to past medical, social, family and surgical history  in my HPI or under PMH/PSH/FH review section, otherwise it is noncontributory **           HPI    Review of Systems   Musculoskeletal:  Positive for myalgias and neck pain.   All other systems reviewed and are negative.             Objective     /84   Pulse 82   Temp 37.2 °C (98.9 °F) (Temporal)   Resp 16   Ht 1.372 m (4' 6\")   Wt 76.3 kg (168 lb 3.2 oz)   SpO2 98%   BMI 40.55 kg/m²      Physical Exam  Vitals and nursing note reviewed.   Constitutional:       General: She is not in acute distress.     Appearance: Normal appearance. She is well-developed. She is not ill-appearing, toxic-appearing or diaphoretic.   HENT:      Head: Normocephalic.   Cardiovascular:      Rate and Rhythm: Normal rate and regular rhythm.      Heart sounds: Normal heart sounds.   Pulmonary:      Effort: Pulmonary effort is normal. No respiratory distress.      Breath sounds: Normal breath sounds. No wheezing, rhonchi or rales.   Musculoskeletal:        Arms:       Comments: Right side of posterior neck and upper trap without any lesions or rashes or deformity.  Has pain with range of motion of the neck and the right shoulder.  Can flex to 90 degrees.  Has tenderness to palpation over the trapezius.  No midline tenderness.  Grossly normal strength in the upper extremities   Neurological:      Mental Status: She is alert.      Motor: No abnormal muscle tone.   Psychiatric:         Mood and Affect: Mood normal.         Behavior: Behavior normal.                             Assessment & Plan       1. Strain of neck muscle, initial encounter  cyclobenzaprine (FLEXERIL) 5 mg tablet          - Dx, plan & d/c instructions discussed   - Rest, stay hydrated, OTC Motrin and/or " Tylenol as needed      Follow up with your regular primary care providers office within a week to keep them updated and informed of this visit and for regular routine health maintenance check-ups. ER if not improving in 2-3 days or if feeling/getting worse. (If you do not have a primary care provider and need to schedule one you may call [facility] at [phone] to do this).    Patient left in stable condition     Discussed if any testing, labs or imaging studies are obtained outside of the [facility] facility, it is their responsibility to contact the Urgent Care and let us know that it was done and get us the results so adequate follow up can be initiated    Pertinent prior lab work and/or imaging studies in Epic have been reviewed by me today on day of this visit and taken into account for my treatment and plan today    Pertinent PMH/PSH and/or chronic conditions and medications if any were reviewed today and taken into account for my treatment and plan today    Pertinent prior office visit notes in [facility] have been reviewed by me today on day of this visit.    Please note that this dictation may have been created using voice recognition software, if so I have made every reasonable attempt to correct obvious errors, but I expect that there are errors of grammar and possibly content that I did not discover before finalizing the note.

## 2024-12-03 ENCOUNTER — PRE-ADMISSION TESTING (OUTPATIENT)
Dept: ADMISSIONS | Facility: MEDICAL CENTER | Age: 34
End: 2024-12-03
Attending: COLON & RECTAL SURGERY
Payer: MEDICAID

## 2024-12-03 NOTE — OR NURSING
FOR SURGERY ON: 12/4/24  PATIENT'S AUNT PRESENT FOR PAT PHONE APPT. INTERPRETOR MATRI ID [id number] PRESENT ON CALL AS WELL.  Patient provided medication instructions per [facility]'s Guideline for Pre-Operative Medication Management. Preparing For Your Procedure packet reviewed with patient. Encouraged patient to increase oral intake the day prior to procedure including intake of electrolyte drinks such as Gatorade or electrolyte water. Patient advised to contact surgeon with any additional questions or concerns.     PATIENT REPORTS SHE IS UNABLE TO READ OR WRITE, OTHER THAN SIGNING HER NAME.     DOWN SYNDROME    Patient verbalized understanding of their instructions.    DELIA - Patient instructed to bring device DOS    Fall risk    Malian speaking - PATIENT ALSO SPEAKS ENGLISH BUT REQUESTS  SERVICES BE AVAILABLE IN PREOP     Adverse reaction to anesthesia: NONE REPORTED    Medication instructions emailed to address on patient's chart

## 2024-12-04 ENCOUNTER — ANESTHESIA (OUTPATIENT)
Dept: SURGERY | Facility: MEDICAL CENTER | Age: 34
End: 2024-12-04
Payer: MEDICAID

## 2024-12-04 ENCOUNTER — HOSPITAL ENCOUNTER (OUTPATIENT)
Facility: MEDICAL CENTER | Age: 34
End: 2024-12-04
Attending: COLON & RECTAL SURGERY | Admitting: COLON & RECTAL SURGERY
Payer: MEDICAID

## 2024-12-04 ENCOUNTER — ANESTHESIA EVENT (OUTPATIENT)
Dept: SURGERY | Facility: MEDICAL CENTER | Age: 34
End: 2024-12-04
Payer: MEDICAID

## 2024-12-04 VITALS
SYSTOLIC BLOOD PRESSURE: 134 MMHG | DIASTOLIC BLOOD PRESSURE: 61 MMHG | RESPIRATION RATE: 20 BRPM | HEART RATE: 75 BPM | TEMPERATURE: 97 F | BODY MASS INDEX: 38.93 KG/M2 | WEIGHT: 168.21 LBS | OXYGEN SATURATION: 94 % | HEIGHT: 55 IN

## 2024-12-04 LAB
ERYTHROCYTE [DISTWIDTH] IN BLOOD BY AUTOMATED COUNT: 42.8 FL (ref 35.9–50)
HCG UR QL: NEGATIVE
HCT VFR BLD AUTO: 42.6 % (ref 37–47)
HGB BLD-MCNC: 13.7 G/DL (ref 12–16)
MCH RBC QN AUTO: 26.8 PG (ref 27–33)
MCHC RBC AUTO-ENTMCNC: 32.2 G/DL (ref 32.2–35.5)
MCV RBC AUTO: 83.4 FL (ref 81.4–97.8)
PLATELET # BLD AUTO: 399 K/UL (ref 164–446)
PMV BLD AUTO: 10.1 FL (ref 9–12.9)
RBC # BLD AUTO: 5.11 M/UL (ref 4.2–5.4)
WBC # BLD AUTO: 10.7 K/UL (ref 4.8–10.8)

## 2024-12-04 PROCEDURE — C1726 CATH, BAL DIL, NON-VASCULAR: HCPCS | Performed by: COLON & RECTAL SURGERY

## 2024-12-04 PROCEDURE — 160002 HCHG RECOVERY MINUTES (STAT): Performed by: COLON & RECTAL SURGERY

## 2024-12-04 PROCEDURE — 160202 HCHG ENDO MINUTES - 1ST 30 MINS LEVEL 3: Performed by: COLON & RECTAL SURGERY

## 2024-12-04 PROCEDURE — 160025 RECOVERY II MINUTES (STATS): Performed by: COLON & RECTAL SURGERY

## 2024-12-04 PROCEDURE — 700111 HCHG RX REV CODE 636 W/ 250 OVERRIDE (IP): Mod: UD | Performed by: COLON & RECTAL SURGERY

## 2024-12-04 PROCEDURE — C1769 GUIDE WIRE: HCPCS | Performed by: COLON & RECTAL SURGERY

## 2024-12-04 PROCEDURE — 700101 HCHG RX REV CODE 250: Performed by: ANESTHESIOLOGY

## 2024-12-04 PROCEDURE — 160035 HCHG PACU - 1ST 60 MINS PHASE I: Performed by: COLON & RECTAL SURGERY

## 2024-12-04 PROCEDURE — 700111 HCHG RX REV CODE 636 W/ 250 OVERRIDE (IP): Mod: JZ,UD | Performed by: ANESTHESIOLOGY

## 2024-12-04 PROCEDURE — 160009 HCHG ANES TIME/MIN: Performed by: COLON & RECTAL SURGERY

## 2024-12-04 PROCEDURE — 160046 HCHG PACU - 1ST 60 MINS PHASE II: Performed by: COLON & RECTAL SURGERY

## 2024-12-04 PROCEDURE — C1889 IMPLANT/INSERT DEVICE, NOC: HCPCS | Performed by: COLON & RECTAL SURGERY

## 2024-12-04 PROCEDURE — 160048 HCHG OR STATISTICAL LEVEL 1-5: Performed by: COLON & RECTAL SURGERY

## 2024-12-04 PROCEDURE — 85027 COMPLETE CBC AUTOMATED: CPT

## 2024-12-04 PROCEDURE — 700105 HCHG RX REV CODE 258: Mod: UD | Performed by: COLON & RECTAL SURGERY

## 2024-12-04 PROCEDURE — 81025 URINE PREGNANCY TEST: CPT

## 2024-12-04 PROCEDURE — 36415 COLL VENOUS BLD VENIPUNCTURE: CPT

## 2024-12-04 RX ORDER — ROCURONIUM BROMIDE 10 MG/ML
INJECTION, SOLUTION INTRAVENOUS PRN
Status: DISCONTINUED | OUTPATIENT
Start: 2024-12-04 | End: 2024-12-04 | Stop reason: SURG

## 2024-12-04 RX ORDER — ALBUTEROL SULFATE 5 MG/ML
2.5 SOLUTION RESPIRATORY (INHALATION)
Status: DISCONTINUED | OUTPATIENT
Start: 2024-12-04 | End: 2024-12-04 | Stop reason: HOSPADM

## 2024-12-04 RX ORDER — HYDROCODONE BITARTRATE AND ACETAMINOPHEN 7.5; 325 MG/15ML; MG/15ML
15 SOLUTION ORAL
Status: DISCONTINUED | OUTPATIENT
Start: 2024-12-04 | End: 2024-12-04 | Stop reason: HOSPADM

## 2024-12-04 RX ORDER — LIDOCAINE HYDROCHLORIDE 40 MG/ML
SOLUTION TOPICAL PRN
Status: DISCONTINUED | OUTPATIENT
Start: 2024-12-04 | End: 2024-12-04 | Stop reason: SURG

## 2024-12-04 RX ORDER — SODIUM CHLORIDE, SODIUM LACTATE, POTASSIUM CHLORIDE, CALCIUM CHLORIDE 600; 310; 30; 20 MG/100ML; MG/100ML; MG/100ML; MG/100ML
500 INJECTION, SOLUTION INTRAVENOUS ONCE
Status: COMPLETED | OUTPATIENT
Start: 2024-12-04 | End: 2024-12-04

## 2024-12-04 RX ORDER — DEXAMETHASONE SODIUM PHOSPHATE 4 MG/ML
INJECTION, SOLUTION INTRA-ARTICULAR; INTRALESIONAL; INTRAMUSCULAR; INTRAVENOUS; SOFT TISSUE PRN
Status: DISCONTINUED | OUTPATIENT
Start: 2024-12-04 | End: 2024-12-04 | Stop reason: SURG

## 2024-12-04 RX ORDER — SODIUM CHLORIDE, SODIUM LACTATE, POTASSIUM CHLORIDE, CALCIUM CHLORIDE 600; 310; 30; 20 MG/100ML; MG/100ML; MG/100ML; MG/100ML
INJECTION, SOLUTION INTRAVENOUS CONTINUOUS
Status: DISCONTINUED | OUTPATIENT
Start: 2024-12-04 | End: 2024-12-04 | Stop reason: HOSPADM

## 2024-12-04 RX ORDER — DIPHENHYDRAMINE HYDROCHLORIDE 50 MG/ML
6.25 INJECTION INTRAMUSCULAR; INTRAVENOUS
Status: DISCONTINUED | OUTPATIENT
Start: 2024-12-04 | End: 2024-12-04 | Stop reason: HOSPADM

## 2024-12-04 RX ORDER — LIDOCAINE HYDROCHLORIDE 20 MG/ML
INJECTION, SOLUTION EPIDURAL; INFILTRATION; INTRACAUDAL; PERINEURAL PRN
Status: DISCONTINUED | OUTPATIENT
Start: 2024-12-04 | End: 2024-12-04 | Stop reason: SURG

## 2024-12-04 RX ORDER — ONDANSETRON 2 MG/ML
4 INJECTION INTRAMUSCULAR; INTRAVENOUS
Status: DISCONTINUED | OUTPATIENT
Start: 2024-12-04 | End: 2024-12-04 | Stop reason: HOSPADM

## 2024-12-04 RX ORDER — ONDANSETRON 2 MG/ML
INJECTION INTRAMUSCULAR; INTRAVENOUS PRN
Status: DISCONTINUED | OUTPATIENT
Start: 2024-12-04 | End: 2024-12-04 | Stop reason: SURG

## 2024-12-04 RX ORDER — TRIAMCINOLONE ACETONIDE 40 MG/ML
INJECTION, SUSPENSION INTRA-ARTICULAR; INTRAMUSCULAR
Status: DISCONTINUED | OUTPATIENT
Start: 2024-12-04 | End: 2024-12-04 | Stop reason: HOSPADM

## 2024-12-04 RX ORDER — EPHEDRINE SULFATE 50 MG/ML
5 INJECTION, SOLUTION INTRAVENOUS
Status: DISCONTINUED | OUTPATIENT
Start: 2024-12-04 | End: 2024-12-04 | Stop reason: HOSPADM

## 2024-12-04 RX ORDER — HYDROCODONE BITARTRATE AND ACETAMINOPHEN 7.5; 325 MG/15ML; MG/15ML
30 SOLUTION ORAL
Status: DISCONTINUED | OUTPATIENT
Start: 2024-12-04 | End: 2024-12-04 | Stop reason: HOSPADM

## 2024-12-04 RX ADMIN — SUGAMMADEX 200 MG: 100 INJECTION, SOLUTION INTRAVENOUS at 14:01

## 2024-12-04 RX ADMIN — LIDOCAINE HYDROCHLORIDE 4 ML: 40 SOLUTION TOPICAL at 13:48

## 2024-12-04 RX ADMIN — ONDANSETRON 4 MG: 2 INJECTION INTRAMUSCULAR; INTRAVENOUS at 13:51

## 2024-12-04 RX ADMIN — DEXAMETHASONE SODIUM PHOSPHATE 8 MG: 4 INJECTION INTRA-ARTICULAR; INTRALESIONAL; INTRAMUSCULAR; INTRAVENOUS; SOFT TISSUE at 13:48

## 2024-12-04 RX ADMIN — SODIUM CHLORIDE, POTASSIUM CHLORIDE, SODIUM LACTATE AND CALCIUM CHLORIDE: 600; 310; 30; 20 INJECTION, SOLUTION INTRAVENOUS at 13:45

## 2024-12-04 RX ADMIN — FENTANYL CITRATE 50 MCG: 50 INJECTION, SOLUTION INTRAMUSCULAR; INTRAVENOUS at 13:48

## 2024-12-04 RX ADMIN — ROCURONIUM BROMIDE 50 MG: 50 INJECTION, SOLUTION INTRAVENOUS at 13:48

## 2024-12-04 RX ADMIN — LIDOCAINE HYDROCHLORIDE 60 MG: 20 INJECTION, SOLUTION EPIDURAL; INFILTRATION; INTRACAUDAL; PERINEURAL at 13:48

## 2024-12-04 RX ADMIN — PROPOFOL 180 MG: 10 INJECTION, EMULSION INTRAVENOUS at 13:48

## 2024-12-04 ASSESSMENT — PAIN DESCRIPTION - PAIN TYPE
TYPE: SURGICAL PAIN

## 2024-12-04 ASSESSMENT — FIBROSIS 4 INDEX
FIB4 SCORE: 0.3
FIB4 SCORE: 0.3

## 2024-12-04 NOTE — ANESTHESIA PROCEDURE NOTES
Airway    Date/Time: 12/4/2024 1:48 PM    Performed by: Song Cuevas M.D.  Authorized by: Song Cuevas M.D.    Location:  OR  Urgency:  Elective  Difficult Airway: No    Indications for Airway Management:  Anesthesia      Spontaneous Ventilation: absent    Sedation Level:  Deep  Preoxygenated: Yes    Patient Position:  Sniffing  Mask Difficulty Assessment:  1 - vent by mask  Final Airway Type:  Endotracheal airway  Final Endotracheal Airway:  ETT  Cuffed: Yes    Technique Used for Successful ETT Placement:  Direct laryngoscopy    Insertion Site:  Oral  Blade Type:  Aria  Laryngoscope Blade/Videolaryngoscope Blade Size:  3  ETT Size (mm):  6.5  Measured from:  Teeth  ETT to Teeth (cm):  19  Placement Verified by: auscultation and capnometry    Cormack-Lehane Classification:  Grade I - full view of glottis  Number of Attempts at Approach:  1

## 2024-12-04 NOTE — OP REPORT
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 female with a history of Sleeve Gastrectomy anatomy, Dysphagia, Dyspepsia, and Suspected Gastric Stenosis. She is taken to the operating room today for Esophagogastroduodenoscopy and dilatation and possible septotomy for suspected stenosis.       DETAILS OF PROCEDURE: After an extensive informed consent discussion and process, the patient was brought to the operating room and was placed in a supine position on the endoscopy table. The patient was then given general anesthesia and endotracheal tube intubation. During the course of the procedure, the patient was monitored continuously with pulse oximetry, telemetry, and intermittent blood pressure readings.     After placement of the oral bite block to protect the teeth, gums, and gastroscope, the gastroscope was slowly introduced and advanced into the esophagus. It was slowly advanced down to the gastroesophageal junction region. The GE junction at 34 cm exhibited irregular Zline and Grade B esophagitis. The gastroscope passed without difficulty into the proximal body of the stomach, which appeared normal and consistent with sleeve resection.     A stenosis was present at the Distal sleeve and included a definitive web-like septum which also occurred at a site of angulation of the incisura. The gastroscope was then advanced into the duodenum without difficulty. The first,  second, and third portions of the duodenum appeared normal with no evidence of duodenitis or ulcers. Slowly, the gastroscope was withdrawn, and the duodenum appeared normal. The antrum appeared normal. A retroflexion view was not possible to be safely performed.      The 35mm achalasia balloon was chosen the savory wire was advanced down into the duodenum and the scope withdrawn. Next the achalasia balloon was gently advanced over the guidewire and the gastroscope carefully reintroduced. The balloon was positioned so as to straddle the stenosis. It was inflated to 35mm or 1.4atm and held for 3 minutes, with visible blanching observed through the balloon. The balloon was then deflated and removed. Trace blood but no untoward events were observed.     40mg Kenelog were injected into the stenosis in four 1 cc aliquots and quadrants.    The gastroscope was slowly withdrawn as air was aspirated and the area of the proximal pouch and gastroesophageal junction region were again carefully inspected, which all appeared normal. The gastroesophageal junction was carefully inspected as was the esophagus as we withdrew the gastroscope and these areas appeared normal and were photographed for documentation purposes. The gastroscope was then withdrawn.    IMPRESSION/PLAN: Web like septum Distal stenosis, treated with dilation.  Will need further endoscopic septotomy and dilation in coming weeks and months. Continue PPI.    The patient tolerated the procedure well and there were no apparent complications.  She was awakened and transferred to the recovery area in satisfactory condition.       ____________________________________   Marquise Barfield MD  DD: 11/25/2020  7:56 AM    CC:  Marquise Barfield Surgical [facility]

## 2024-12-04 NOTE — PROGRESS NOTES
Medication history reviewed with PT at bedside with language line  Ines #[id number].    Med rec is complete per PT reporting    Allergies reviewed.     Patient denies any outpatient antibiotics in the last 30 days.     Patient is not taking anticoagulants.    Preferred pharmacy for this visit - [facility] on [address] ([phone])

## 2024-12-04 NOTE — ANESTHESIA TIME REPORT
Anesthesia Start and Stop Event Times       Date Time Event    12/4/2024 1339 Ready for Procedure     1345 Anesthesia Start     1408 Anesthesia Stop          Responsible Staff  12/04/24      Name Role Begin End    Song Cuevas M.D. Anesth 1345 1409          Overtime Reason:  no overtime (within assigned shift)    Comments:

## 2024-12-04 NOTE — DISCHARGE INSTRUCTIONS
HOME CARE INSTRUCTIONS    ACTIVITY: Rest and take it easy for the first 24 hours.  A responsible adult is recommended to remain with you during that time.  It is normal to feel sleepy.  We encourage you to not do anything that requires balance, judgment or coordination.    FOR 24 HOURS DO NOT:  Drive, operate machinery or run household appliances.  Drink beer or alcoholic beverages.  Make important decisions or sign legal documents.    SPECIAL INSTRUCTIONS: EGD Dilation D/C instructions:    1. DIET: Clear liquids and transition to shakes and smoothies.  Progressing as instructed will make for a smooth transition after the procedure and prevent any pain associated with eating foods before your stomach is ready or eating too much.  Drink enough water to keep your urine pale yellow.  Increase water intake if your urine is a darker yellow or if have burning with urination.  Nausea and vomiting once or twice after you leave the hospital is normal.  Sip fluids continually and stay hydrated.    2.  SUPPLEMENTS: Start your supplements day after surgery.  You may need to cut some supplements in half initially.     3. ACTIVITIES: After discharge from the hospital, you may resume full routine activities.     4. DRIVING: You may drive whenever you are off pain medications and are able to perform the activities needed to drive, i.e. turning, bending, twisting, etc.    5. BOWEL FUNCTION: Constipation is common after a procedure, especially with pain medications. The combination of pain medication and decreased activity level can cause constipation in otherwise normal patients. If you feel this is occurring, take a laxative (Milk of Magnesia, Miralax, etc.) until the problem has resolved.  Diarrhea the first few days after your procedure can also be normal.  You may notice that it is dark in color or see blood, which is also normal and should subside in the first week post-op.    6. PAIN MEDICATION: For minimal discomfort you may  use liquid Tylenol 650 mg every 4 hours.  DO NOT exceed 4,000 mg of Tylenol in 24 hours.  DO NOT use non steroidal anti-inflamatory medication such as: Asprin, Ibuprofen, Advil, Motrin or Aleve.  These medication can cause ulcers after weight loss surgery.    7.CALL IF YOU HAVE: (1) Fevers to more than 101.50 F, (2) leg pain or swelling (3) Persistent Nausea or Vomiting for over 24 hours.  Use your anti nausea medication as prescribed. (4) Call 911 if sudden onset of chest pain or shortness of breath that does not improve with 5-10 min of rest.    8. APPOINTMENT: Contact our office at [phone] for repeat dilation in 3-4 weeks.  Our office hours are Monday-Friday, 8am-4:30pm.  Please try to call during these hours when we are better able to assist you.    If you have any additional questions, please do not hesitate to call the office and speak to either myself or the physician on call.    Office address:  [place] [address] [address] [facility]    [address] [place] [id number]    DIET: To avoid nausea, slowly advance diet as tolerated, avoiding spicy or greasy foods for the first day.  Add more substantial food to your diet according to your physician's instructions.  Babies can be fed formula or breast milk as soon as they are hungry.  INCREASE FLUIDS AND FIBER TO AVOID CONSTIPATION.    SURGICAL DRESSING/BATHING: see above    MEDICATIONS: Resume taking daily medication.  Take prescribed pain medication with food.  If no medication is prescribed, you may take non-aspirin pain medication if needed.  PAIN MEDICATION CAN BE VERY CONSTIPATING.  Take a stool softener or laxative such as senokot, pericolace, or milk of magnesia if needed.    Prescription given for none.  Last pain medication given at none.    A follow-up appointment should be arranged with your doctor in 1-2 weeks; call to schedule.    You should CALL YOUR PHYSICIAN if you develop:  Fever greater than 101 degrees F.  Pain not relieved by  medication, or persistent nausea or vomiting.  Excessive bleeding (blood soaking through dressing) or unexpected drainage from the wound.  Extreme redness or swelling around the incision site, drainage of pus or foul smelling drainage.  Inability to urinate or empty your bladder within 8 hours.  Problems with breathing or chest pain.    You should call 911 if you develop problems with breathing or chest pain.  If you are unable to contact your doctor or surgical center, you should go to the nearest emergency room or urgent care center.  Physician's telephone #: [phone]     MILD FLU-LIKE SYMPTOMS ARE NORMAL.  YOU MAY EXPERIENCE GENERALIZED MUSCLE ACHES, THROAT IRRITATION, HEADACHE AND/OR SOME NAUSEA.    If any questions arise, call your doctor.  If your doctor is not available, please feel free to call the Surgical Center at [phone].  The Center is open Monday through Friday from 7AM to 7PM.      A registered nurse may call you a few days after your surgery to see how you are doing after your procedure.    You may also receive a survey in the mail within the next two weeks and we ask that you take a few moments to complete the survey and return it to us.  Our goal is to provide you with very good care and we value your comments.     Depression / Suicide Risk    As you are discharged from this [facility] facility, it is important to learn how to keep safe from harming yourself.    Recognize the warning signs:  Abrupt changes in personality, positive or negative- including increase in energy   Giving away possessions  Change in eating patterns- significant weight changes-  positive or negative  Change in sleeping patterns- unable to sleep or sleeping all the time   Unwillingness or inability to communicate  Depression  Unusual sadness, discouragement and loneliness  Talk of wanting to die  Neglect of personal appearance   Rebelliousness- reckless behavior  Withdrawal from people/activities they  love  Confusion- inability to concentrate     If you or a loved one observes any of these behaviors or has concerns about self-harm, here's what you can do:  Talk about it- your feelings and reasons for harming yourself  Remove any means that you might use to hurt yourself (examples: pills, rope, extension cords, firearm)  Get professional help from the community (Mental Health, Substance Abuse, psychological counseling)  Do not be alone:Call your Safe Contact- someone whom you trust who will be there for you.  Call your local CRISIS HOTLINE 947-2170 or 983-479-2277  Call your local Children's Mobile Crisis Response Team [place] (983) 370-0192 or www.[employer]  Call the toll free National Suicide Prevention Hotlines   National Suicide Prevention Lifeline 636-077-ABJC (5898)  National Hope Line Network 800-SUICIDE (991-6484)    I acknowledge receipt and understanding of these Home Care instructions.

## 2024-12-04 NOTE — ANESTHESIA PREPROCEDURE EVALUATION
Case: [id number] Date/Time: 12/04/24 1437    Procedure: ESOPHAGOGASTRODUODENOSCOPY (EGD) WITH DILATATION    Pre-op diagnosis: DYSPHAGIA    [facility]    Surgeons: Marquise Barfield M.D.            Relevant Problems   ANESTHESIA   (positive) Obstructive sleep apnea syndrome      Other   (positive) Down syndrome   (positive) Prediabetes       Physical Exam    Airway   Mallampati: II  TM distance: >3 FB  Neck ROM: full       Cardiovascular - normal exam  Rhythm: regular  Rate: normal  (-) murmur     Dental - normal exam           Pulmonary - normal exam  Breath sounds clear to auscultation     Abdominal    Neurological - normal exam                   Anesthesia Plan    ASA 3   ASA physical status 3 criteria: morbid obesity - BMI greater than or equal to 40    Plan - general       Airway plan will be ETT          Induction: intravenous    Postoperative Plan: Postoperative administration of opioids is intended.    Pertinent diagnostic labs and testing reviewed    Informed Consent:    Anesthetic plan and risks discussed with patient.

## 2024-12-04 NOTE — OR NURSING
Patient resting comfortably, denies pain and nausea. Patient's aunt updated via phone. VSS. Report provided to Jinny Clemons, RN, RN. Patient transported to Phase 2 with RN on room air @ 94%. All personal belongings transported with patient.

## 2024-12-04 NOTE — ANESTHESIA POSTPROCEDURE EVALUATION
Patient: Christi Mcgarry    Procedure Summary       Date: 12/04/24 Room / Location: [facility] OR 03 / SURGERY [facility]    Anesthesia Start: 1345 Anesthesia Stop: 1408    Procedure: ESOPHAGOGASTRODUODENOSCOPY (EGD) WITH DILATATION (Esophagus) Diagnosis: (DYSPHAGIA)    Surgeons: Marquise Barfield M.D. Responsible Provider: Song Cuevas M.D.    Anesthesia Type: general ASA Status: 3            Final Anesthesia Type: general  Last vitals  BP   Blood Pressure: 126/59    Temp   36.4 °C (97.6 °F)    Pulse   63   Resp   14    SpO2   100 %      Anesthesia Post Evaluation    Patient location during evaluation: PACU  Patient participation: complete - patient participated  Level of consciousness: sleepy but conscious    Airway patency: patent  Anesthetic complications: no  Cardiovascular status: hemodynamically stable  Respiratory status: acceptable  Hydration status: euvolemic    PONV: none          No notable events documented.     Nurse Pain Score: 0 (NPRS)

## 2024-12-04 NOTE — OR NURSING
Discharge instructions to pt and family member. Both verbalized understanding. Pt taken to car via wheelchair.

## 2025-01-07 ENCOUNTER — OFFICE VISIT (OUTPATIENT)
Dept: INTERNAL MEDICINE | Facility: OTHER | Age: 35
End: 2025-01-07
Payer: MEDICAID

## 2025-01-07 VITALS
HEIGHT: 55 IN | BODY MASS INDEX: 39.76 KG/M2 | TEMPERATURE: 98.5 F | OXYGEN SATURATION: 96 % | WEIGHT: 171.8 LBS | DIASTOLIC BLOOD PRESSURE: 72 MMHG | SYSTOLIC BLOOD PRESSURE: 114 MMHG | HEART RATE: 56 BPM

## 2025-01-07 DIAGNOSIS — R73.03 PREDIABETES: ICD-10-CM

## 2025-01-07 DIAGNOSIS — Q90.9 DOWN SYNDROME: ICD-10-CM

## 2025-01-07 DIAGNOSIS — E66.813 CLASS 3 SEVERE OBESITY DUE TO EXCESS CALORIES WITHOUT SERIOUS COMORBIDITY WITH BODY MASS INDEX (BMI) OF 40.0 TO 44.9 IN ADULT (HCC): ICD-10-CM

## 2025-01-07 DIAGNOSIS — B35.3 TINEA PEDIS OF RIGHT FOOT: ICD-10-CM

## 2025-01-07 DIAGNOSIS — E66.01 CLASS 3 SEVERE OBESITY DUE TO EXCESS CALORIES WITHOUT SERIOUS COMORBIDITY WITH BODY MASS INDEX (BMI) OF 40.0 TO 44.9 IN ADULT (HCC): ICD-10-CM

## 2025-01-07 PROCEDURE — 3078F DIAST BP <80 MM HG: CPT | Mod: GC

## 2025-01-07 PROCEDURE — 99214 OFFICE O/P EST MOD 30 MIN: CPT | Mod: GC

## 2025-01-07 PROCEDURE — 3074F SYST BP LT 130 MM HG: CPT | Mod: GC

## 2025-01-07 RX ORDER — CLOTRIMAZOLE 1 %
1 CREAM (GRAM) TOPICAL 2 TIMES DAILY
Qty: 3 G | Refills: 2 | Status: SHIPPED | OUTPATIENT
Start: 2025-01-07

## 2025-01-07 ASSESSMENT — ENCOUNTER SYMPTOMS
CHILLS: 0
WEIGHT LOSS: 0
TREMORS: 0
DOUBLE VISION: 0
EYE PAIN: 0
PND: 0
EYE REDNESS: 0
SORE THROAT: 0
HEARTBURN: 0
SHORTNESS OF BREATH: 0
BLURRED VISION: 0
WEAKNESS: 0
HEADACHES: 0
DIARRHEA: 0
MEMORY LOSS: 0
FEVER: 0
FOCAL WEAKNESS: 0
INSOMNIA: 0
WHEEZING: 0
DIZZINESS: 0
ORTHOPNEA: 0
PALPITATIONS: 0
MYALGIAS: 0
CONSTIPATION: 0
DEPRESSION: 0
ABDOMINAL PAIN: 0
COUGH: 0
FALLS: 0
HALLUCINATIONS: 0
SEIZURES: 0
BRUISES/BLEEDS EASILY: 0
SINUS PAIN: 0
NERVOUS/ANXIOUS: 0

## 2025-01-07 ASSESSMENT — PATIENT HEALTH QUESTIONNAIRE - PHQ9: CLINICAL INTERPRETATION OF PHQ2 SCORE: 0

## 2025-01-07 ASSESSMENT — FIBROSIS 4 INDEX: FIB4 SCORE: 0.26

## 2025-01-07 NOTE — PROGRESS NOTES
Established Patient    Patient Care Team:  Quincy Varela M.D. as PCP - General (Internal Medicine)  Preferred home care  as Respiratory Therapist (DME Supplier)    HPI:  he patient is status post gastric sleeve surgery (robotic sleeve gastrectomy) performed in December 2023 and status post cholecystectomy in March 2024. Her medical history is significant for prediabetes, obstructive sleep apnea (DELIA) managed with CPAP, gastroesophageal reflux disease (GERD), dysphagia s/p EGD with esophageal dilatation.      The patient is status post bariatric surgery for morbid obesity and has since developed chronic dysphagia and postprandial vomiting. These symptoms prompted evaluation by surgery for a possible esophageal stenosis, after which the patient was referred to gastroenterology. She underwent an esophageal dilatation procedure on 12/04/24. The patient reports only mild improvement in her symptoms of nausea, postprandial vomiting, and dysphagia following the procedure. Her aunt, who serves as her caregiver, stated that the patient's gastroenterologist advised that she may require an additional 3-4 esophageal dilatation procedures to achieve significant relief from her dysphagia. Ongoing follow-up with gastroenterology is planned to address this issue and monitor her progress. Further interventions will be guided by her response to these procedures.    Additionally, the patient reports experiencing itchiness and desquamation in the interdigital areas of both feet. She states that she has had similar symptoms in the past, which resolved with antifungal medication.     The patient's aunt also reports Christi is having difficulties with weight loss, attributing it in part to consuming candies and other snacks when visiting her sister’s house. She acknowledges being aware that these foods are not beneficial for her health but states that she still chooses to eat them.    Preventative health  measures:  Flu shot: Declined  COVID-19 vaccine: Declined  Hepatitis C screening: Declined  HIV screening: Declined given the patient has not had sexual activity in the past.      Review of Systems   Constitutional:  Negative for chills, fever and weight loss.   HENT:  Negative for hearing loss, sinus pain, sore throat and tinnitus.    Eyes:  Negative for blurred vision, double vision, pain and redness.   Respiratory:  Negative for cough, shortness of breath and wheezing.    Cardiovascular:  Negative for chest pain, palpitations, orthopnea, leg swelling and PND.   Gastrointestinal:  Negative for abdominal pain, constipation, diarrhea and heartburn.   Genitourinary:  Negative for dysuria and hematuria.   Musculoskeletal:  Negative for falls, joint pain and myalgias.   Skin:  Negative for itching and rash.   Neurological:  Negative for dizziness, tremors, focal weakness, seizures, weakness and headaches.   Endo/Heme/Allergies:  Does not bruise/bleed easily.   Psychiatric/Behavioral:  Negative for depression, hallucinations and memory loss. The patient is not nervous/anxious and does not have insomnia.    :    Past Medical History:   Diagnosis Date    ADHD (attention deficit hyperactivity disorder)     Anxiety     Chickenpox     Depression     Pt lost mother October 2023    Gastroesophageal reflux disease without esophagitis     Headache 05/25/2016    Heart burn     Insomnia 05/25/2016    Learning disabilities     unable read or write    Morbid obesity (HCC) 05/25/2016    Nocturnal enuresis 05/25/2016    Obesity     Odynophagia     DELIA (obstructive sleep apnea) 05/25/2016    Prediabetes 05/25/2016    Sleep apnea     CPAP    UTI (lower urinary tract infection) 05/25/2016       Social History     Tobacco Use    Smoking status: Never    Smokeless tobacco: Never   Vaping Use    Vaping status: Never Used   Substance Use Topics    Alcohol use: No    Drug use: No     Comment: pt denies       Current Outpatient Medications  "  Medication Sig Dispense Refill    clotrimazole (LOTRIMIN) 1 % Cream Apply 1 Application topically 2 times a day. 3 g 2    VITAMIN D PO Take 1 Tablet by mouth every day.         multivitamin Tab Take 1 Tablet by mouth every day.         CALCIUM PO Take 1 Tablet by mouth every day.         Cyanocobalamin (VITAMIN B 12 PO) Take 1 Tablet by mouth every day.         cyclobenzaprine (FLEXERIL) 5 mg tablet Take 1 Tablet by mouth every evening as needed for Muscle Spasms or Mild Pain. 15 Tablet 0     No current facility-administered medications for this visit.       /72 (BP Location: Left arm, Patient Position: Sitting, BP Cuff Size: Adult)   Pulse (!) 56   Temp 36.9 °C (98.5 °F) (Temporal)   Ht 1.372 m (4' 6\")   Wt 77.9 kg (171 lb 12.8 oz)   SpO2 96%   BMI 41.42 kg/m²   Physical Exam  Constitutional:       General: She is not in acute distress.     Appearance: Normal appearance.   HENT:      Head: Normocephalic.      Right Ear: Tympanic membrane normal.      Left Ear: Tympanic membrane normal.      Nose: Nose normal. No congestion or rhinorrhea.      Mouth/Throat:      Mouth: Mucous membranes are moist.   Eyes:      General: No scleral icterus.     Extraocular Movements: Extraocular movements intact.      Conjunctiva/sclera: Conjunctivae normal.      Pupils: Pupils are equal, round, and reactive to light.   Cardiovascular:      Rate and Rhythm: Normal rate and regular rhythm.      Pulses: Normal pulses.      Heart sounds: Normal heart sounds. No murmur heard.     No gallop.   Pulmonary:      Effort: Pulmonary effort is normal. No respiratory distress.      Breath sounds: Normal breath sounds. No wheezing, rhonchi or rales.   Chest:      Chest wall: No tenderness.   Abdominal:      General: Bowel sounds are normal. There is no distension.      Palpations: There is no mass.      Tenderness: There is no abdominal tenderness. There is no guarding or rebound.   Genitourinary:     Rectum: Normal.   Musculoskeletal: "         General: No swelling or deformity. Normal range of motion.      Cervical back: Normal range of motion and neck supple.      Right lower leg: No edema.      Left lower leg: No edema.   Lymphadenopathy:      Cervical: No cervical adenopathy.   Skin:     General: Skin is warm.      Capillary Refill: Capillary refill takes less than 2 seconds.      Coloration: Skin is not jaundiced or pale.      Findings: No bruising, erythema or rash.   Neurological:      General: No focal deficit present.      Mental Status: She is alert and oriented to person, place, and time.      Sensory: No sensory deficit.      Motor: No weakness.      Gait: Gait normal.      Deep Tendon Reflexes: Reflexes normal.   Psychiatric:         Mood and Affect: Mood normal.         Behavior: Behavior normal.         Thought Content: Thought content normal.         Judgment: Judgment normal.           Assessment and Plan:     Esophageal stricture   Dysphagia     Patient still presenting with symptoms of postprandial vomits.  However she reports frequency has reduced compared to prior the GI procedure.  He underwent endoscopic dilatation on 12/04/2024.  However, she will be needing at least 2 or 3 more procedures in order to reach complete alleviation of her esophageal stricture secondary to gastric surgery.  Now she is being followed by general surgery and gastroenterology.      Plan:  -Continue follow-up with GI  -Continue follow-up with surgery.      3.Prediabetes  4.Morbid obesity with BMI of 40.0-44.9, adult (HCC)  The patient’s most recent fasting glucose in March 2024 was 107 mg/dL, reflecting improvement in glycemic control over the months following her gastric bypass surgery. Her blood glucose levels are well-controlled without the need for medications at this time. An A1c test has been ordered, and follow-up will occur once lab results are available. The possibility of initiating medication for prediabetes was discussed with the patient,  and she agreed to consider this depending on her A1c results during the next visit. Although the patient has achieved weight loss following bariatric surgery, her BMI remains elevated at 41.42. She was counseled on the importance of lifestyle modifications, including adhering to a diet low in carbohydrates and fats and engaging in at least 150 minutes of exercise weekly, to support further weight loss and metabolic improvement. Continued monitoring and guidance will be provided to help her achieve her health goals.    Plan:  - A1c lab test, Future  -CMP test, Future  -Discussed a referral to nutrition however it was decided to go over that during her next visit with us.  -Counseling and life style changes to progressively reduce weight.        5. Obstructive sleep apnea syndrome  Patient reports feeling well rested after hours of sleep using CPAP.  Now well-controlled with respiratory device during the night.  Patient does not have any complaints regarding this issue currently.    Plan:     -Continue CPAP     6. Insomnia, unspecified type  The patient’s insomnia is well-controlled with the current dose of melatonin. She reports no issues with sleep disturbances at this time. During the next visit, we will reevaluate the possibility of reducing the dose and consider the progressive discontinuation of melatonin if she continues to maintain good control of her symptoms.     Plan:    -Continue melatonin 20 mg every evening         7. Tinea pedis of right foot  Given the presentation, this is likely consistent with a recurrence of tinea pedis. Antifungal treatment will be considered to address the current symptoms, and the patient was advised on proper foot hygiene to prevent recurrence. Further evaluation will be performed if symptoms persist or worsen.    Plan:  - clotrimazole (LOTRIMIN) 1 % Cream; Apply 1 Application topically 2 times a day for 15 days.  Dispense: 1 Each; Refill: 0     Return in about 3 months (around  3/27/2025).      Quincy Howell M.D., MPH. PGY-1 Internal Medicine  [facility]    This note was created using voice recognition software.  While every attempt is made to ensure accuracy of transcription, occasionally errors occur.

## 2025-03-15 ENCOUNTER — HOSPITAL ENCOUNTER (OUTPATIENT)
Dept: LAB | Facility: MEDICAL CENTER | Age: 35
End: 2025-03-15
Payer: MEDICAID

## 2025-03-15 ENCOUNTER — HOSPITAL ENCOUNTER (OUTPATIENT)
Dept: LAB | Facility: MEDICAL CENTER | Age: 35
End: 2025-03-15
Attending: NURSE PRACTITIONER
Payer: MEDICAID

## 2025-03-15 DIAGNOSIS — R73.03 PREDIABETES: ICD-10-CM

## 2025-03-15 DIAGNOSIS — E66.813 CLASS 3 SEVERE OBESITY DUE TO EXCESS CALORIES WITHOUT SERIOUS COMORBIDITY WITH BODY MASS INDEX (BMI) OF 40.0 TO 44.9 IN ADULT (HCC): ICD-10-CM

## 2025-03-15 DIAGNOSIS — E66.01 CLASS 3 SEVERE OBESITY DUE TO EXCESS CALORIES WITHOUT SERIOUS COMORBIDITY WITH BODY MASS INDEX (BMI) OF 40.0 TO 44.9 IN ADULT (HCC): ICD-10-CM

## 2025-03-15 LAB
25(OH)D3 SERPL-MCNC: 41 NG/ML (ref 30–100)
ALBUMIN SERPL BCP-MCNC: 3.8 G/DL (ref 3.2–4.9)
ALBUMIN SERPL BCP-MCNC: 3.9 G/DL (ref 3.2–4.9)
ALBUMIN/GLOB SERPL: 1.2 G/DL
ALBUMIN/GLOB SERPL: 1.2 G/DL
ALP SERPL-CCNC: 79 U/L (ref 30–99)
ALP SERPL-CCNC: 79 U/L (ref 30–99)
ALT SERPL-CCNC: 12 U/L (ref 2–50)
ALT SERPL-CCNC: 14 U/L (ref 2–50)
ANION GAP SERPL CALC-SCNC: 10 MMOL/L (ref 7–16)
ANION GAP SERPL CALC-SCNC: 10 MMOL/L (ref 7–16)
AST SERPL-CCNC: 17 U/L (ref 12–45)
AST SERPL-CCNC: 17 U/L (ref 12–45)
BASOPHILS # BLD AUTO: 0.4 % (ref 0–1.8)
BASOPHILS # BLD: 0.03 K/UL (ref 0–0.12)
BILIRUB SERPL-MCNC: 0.3 MG/DL (ref 0.1–1.5)
BILIRUB SERPL-MCNC: 0.4 MG/DL (ref 0.1–1.5)
BUN SERPL-MCNC: 10 MG/DL (ref 8–22)
BUN SERPL-MCNC: 10 MG/DL (ref 8–22)
CALCIUM ALBUM COR SERPL-MCNC: 9.1 MG/DL (ref 8.5–10.5)
CALCIUM ALBUM COR SERPL-MCNC: 9.2 MG/DL (ref 8.5–10.5)
CALCIUM SERPL-MCNC: 9 MG/DL (ref 8.5–10.5)
CALCIUM SERPL-MCNC: 9 MG/DL (ref 8.5–10.5)
CHLORIDE SERPL-SCNC: 104 MMOL/L (ref 96–112)
CHLORIDE SERPL-SCNC: 104 MMOL/L (ref 96–112)
CHOLEST SERPL-MCNC: 125 MG/DL (ref 100–199)
CO2 SERPL-SCNC: 23 MMOL/L (ref 20–33)
CO2 SERPL-SCNC: 24 MMOL/L (ref 20–33)
CREAT SERPL-MCNC: 0.58 MG/DL (ref 0.5–1.4)
CREAT SERPL-MCNC: 0.58 MG/DL (ref 0.5–1.4)
EOSINOPHIL # BLD AUTO: 0.11 K/UL (ref 0–0.51)
EOSINOPHIL NFR BLD: 1.3 % (ref 0–6.9)
ERYTHROCYTE [DISTWIDTH] IN BLOOD BY AUTOMATED COUNT: 45.1 FL (ref 35.9–50)
EST. AVERAGE GLUCOSE BLD GHB EST-MCNC: 123 MG/DL
FERRITIN SERPL-MCNC: 21.8 NG/ML (ref 10–291)
FOLATE SERPL-MCNC: 26.3 NG/ML
GFR SERPLBLD CREATININE-BSD FMLA CKD-EPI: 122 ML/MIN/1.73 M 2
GFR SERPLBLD CREATININE-BSD FMLA CKD-EPI: 122 ML/MIN/1.73 M 2
GLOBULIN SER CALC-MCNC: 3.2 G/DL (ref 1.9–3.5)
GLOBULIN SER CALC-MCNC: 3.2 G/DL (ref 1.9–3.5)
GLUCOSE SERPL-MCNC: 99 MG/DL (ref 65–99)
GLUCOSE SERPL-MCNC: 99 MG/DL (ref 65–99)
HBA1C MFR BLD: 5.9 % (ref 4–5.6)
HCT VFR BLD AUTO: 40.9 % (ref 37–47)
HDLC SERPL-MCNC: 44 MG/DL
HGB BLD-MCNC: 12.8 G/DL (ref 12–16)
IMM GRANULOCYTES # BLD AUTO: 0.05 K/UL (ref 0–0.11)
IMM GRANULOCYTES NFR BLD AUTO: 0.6 % (ref 0–0.9)
IRON SERPL-MCNC: 53 UG/DL (ref 40–170)
LDLC SERPL CALC-MCNC: 55 MG/DL
LYMPHOCYTES # BLD AUTO: 3.15 K/UL (ref 1–4.8)
LYMPHOCYTES NFR BLD: 37.4 % (ref 22–41)
MCH RBC QN AUTO: 26.4 PG (ref 27–33)
MCHC RBC AUTO-ENTMCNC: 31.3 G/DL (ref 32.2–35.5)
MCV RBC AUTO: 84.3 FL (ref 81.4–97.8)
MONOCYTES # BLD AUTO: 0.49 K/UL (ref 0–0.85)
MONOCYTES NFR BLD AUTO: 5.8 % (ref 0–13.4)
NEUTROPHILS # BLD AUTO: 4.59 K/UL (ref 1.82–7.42)
NEUTROPHILS NFR BLD: 54.5 % (ref 44–72)
NRBC # BLD AUTO: 0 K/UL
NRBC BLD-RTO: 0 /100 WBC (ref 0–0.2)
PLATELET # BLD AUTO: 322 K/UL (ref 164–446)
PMV BLD AUTO: 11.4 FL (ref 9–12.9)
POTASSIUM SERPL-SCNC: 3.7 MMOL/L (ref 3.6–5.5)
POTASSIUM SERPL-SCNC: 3.8 MMOL/L (ref 3.6–5.5)
PREALB SERPL-MCNC: 16.2 MG/DL (ref 18–38)
PROT SERPL-MCNC: 7 G/DL (ref 6–8.2)
PROT SERPL-MCNC: 7.1 G/DL (ref 6–8.2)
RBC # BLD AUTO: 4.85 M/UL (ref 4.2–5.4)
SODIUM SERPL-SCNC: 137 MMOL/L (ref 135–145)
SODIUM SERPL-SCNC: 138 MMOL/L (ref 135–145)
TRANSFERRIN SERPL-MCNC: 275 MG/DL (ref 200–370)
TRIGL SERPL-MCNC: 130 MG/DL (ref 0–149)
VIT B12 SERPL-MCNC: 759 PG/ML (ref 211–911)
WBC # BLD AUTO: 8.4 K/UL (ref 4.8–10.8)

## 2025-03-15 PROCEDURE — 80053 COMPREHEN METABOLIC PANEL: CPT | Mod: 91

## 2025-03-15 PROCEDURE — 82746 ASSAY OF FOLIC ACID SERUM: CPT

## 2025-03-15 PROCEDURE — 82306 VITAMIN D 25 HYDROXY: CPT

## 2025-03-15 PROCEDURE — 85025 COMPLETE CBC W/AUTO DIFF WBC: CPT

## 2025-03-15 PROCEDURE — 82607 VITAMIN B-12: CPT

## 2025-03-15 PROCEDURE — 36415 COLL VENOUS BLD VENIPUNCTURE: CPT

## 2025-03-15 PROCEDURE — 84425 ASSAY OF VITAMIN B-1: CPT

## 2025-03-15 PROCEDURE — 84466 ASSAY OF TRANSFERRIN: CPT

## 2025-03-15 PROCEDURE — 84630 ASSAY OF ZINC: CPT

## 2025-03-15 PROCEDURE — 84207 ASSAY OF VITAMIN B-6: CPT

## 2025-03-15 PROCEDURE — 83540 ASSAY OF IRON: CPT

## 2025-03-15 PROCEDURE — 84252 ASSAY OF VITAMIN B-2: CPT

## 2025-03-15 PROCEDURE — 84134 ASSAY OF PREALBUMIN: CPT

## 2025-03-15 PROCEDURE — 83036 HEMOGLOBIN GLYCOSYLATED A1C: CPT

## 2025-03-15 PROCEDURE — 80053 COMPREHEN METABOLIC PANEL: CPT

## 2025-03-15 PROCEDURE — 82728 ASSAY OF FERRITIN: CPT

## 2025-03-15 PROCEDURE — 80061 LIPID PANEL: CPT

## 2025-03-17 LAB — ZINC SERPL-MCNC: 75.2 UG/DL (ref 60–120)

## 2025-03-19 LAB
VIT B1 BLD-MCNC: 179 NMOL/L (ref 70–180)
VIT B2 SERPL-SCNC: 30 NMOL/L (ref 5–50)

## 2025-03-20 LAB — VIT B6 SERPL-MCNC: 81.7 NMOL/L (ref 20–125)

## 2025-03-20 NOTE — ANESTHESIA PREPROCEDURE EVALUATION
Case: [id number] Date/Time: 03/29/24 0945    Procedure: CHOLECYSTECTOMY, ROBOT-ASSISTED, USING DA IVAN XI    Anesthesia type: General    Pre-op diagnosis: Biliary colic vs acute cholecystitis    Location: [facility] OR 11 / SURGERY [facility]    Surgeons: Bill Collins M.D.            Relevant Problems   ANESTHESIA   (positive) Obstructive sleep apnea syndrome      Other   (positive) ADHD   (positive) Class 3 severe obesity due to excess calories without serious comorbidity with body mass index (BMI) of 50.0 to 59.9 in adult (HCC)   (positive) Down syndrome   (positive) Right upper quadrant abdominal pain       Physical Exam    Airway   Mallampati: II  TM distance: >3 FB  Neck ROM: full       Cardiovascular - normal exam  Rhythm: regular  Rate: normal  (-) murmur     Dental - normal exam           Pulmonary - normal exam  Breath sounds clear to auscultation     Abdominal   (+) obese     Neurological - normal exam                   Anesthesia Plan    ASA 3   ASA physical status 3 criteria: morbid obesity - BMI greater than or equal to 40    Plan - general       Airway plan will be ETT          Induction: intravenous    Postoperative Plan: Postoperative administration of opioids is intended.    Pertinent diagnostic labs and testing reviewed    Informed Consent:    Anesthetic plan and risks discussed with patient.    Use of blood products discussed with: patient whom consented to blood products.            (4) rarely moist

## 2025-03-27 ENCOUNTER — OFFICE VISIT (OUTPATIENT)
Dept: INTERNAL MEDICINE | Facility: OTHER | Age: 35
End: 2025-03-27
Payer: MEDICAID

## 2025-03-27 VITALS
HEART RATE: 67 BPM | BODY MASS INDEX: 39.25 KG/M2 | SYSTOLIC BLOOD PRESSURE: 111 MMHG | TEMPERATURE: 97.2 F | DIASTOLIC BLOOD PRESSURE: 66 MMHG | WEIGHT: 169.6 LBS | OXYGEN SATURATION: 99 % | HEIGHT: 55 IN

## 2025-03-27 DIAGNOSIS — R13.19 OTHER DYSPHAGIA: ICD-10-CM

## 2025-03-27 DIAGNOSIS — E66.813 CLASS 3 SEVERE OBESITY DUE TO EXCESS CALORIES WITHOUT SERIOUS COMORBIDITY WITH BODY MASS INDEX (BMI) OF 50.0 TO 59.9 IN ADULT (HCC): ICD-10-CM

## 2025-03-27 DIAGNOSIS — E66.01 CLASS 3 SEVERE OBESITY DUE TO EXCESS CALORIES WITHOUT SERIOUS COMORBIDITY WITH BODY MASS INDEX (BMI) OF 50.0 TO 59.9 IN ADULT (HCC): ICD-10-CM

## 2025-03-27 DIAGNOSIS — R73.03 PREDIABETES: ICD-10-CM

## 2025-03-27 PROBLEM — R10.11 RIGHT UPPER QUADRANT ABDOMINAL PAIN: Status: RESOLVED | Noted: 2024-03-27 | Resolved: 2025-03-27

## 2025-03-27 PROBLEM — E87.6 HYPOKALEMIA: Status: RESOLVED | Noted: 2024-03-27 | Resolved: 2025-03-27

## 2025-03-27 RX ORDER — ERGOCALCIFEROL 1.25 MG/1
50000 CAPSULE, LIQUID FILLED ORAL
COMMUNITY
Start: 2025-03-07 | End: 2025-03-27

## 2025-03-27 ASSESSMENT — ENCOUNTER SYMPTOMS
SINUS PAIN: 0
COUGH: 0
SEIZURES: 0
SORE THROAT: 0
FOCAL WEAKNESS: 0
FEVER: 0
EYE PAIN: 0
NERVOUS/ANXIOUS: 0
EYE REDNESS: 0
MEMORY LOSS: 0
DIARRHEA: 0
PALPITATIONS: 0
CHILLS: 0
ORTHOPNEA: 0
TREMORS: 0
DEPRESSION: 0
DIZZINESS: 0
SHORTNESS OF BREATH: 0
ABDOMINAL PAIN: 0
HALLUCINATIONS: 0
MYALGIAS: 0
WEIGHT LOSS: 0
FALLS: 0
DOUBLE VISION: 0
HEARTBURN: 0
CONSTIPATION: 0
INSOMNIA: 0
PND: 0
HEADACHES: 0
WEAKNESS: 0
WHEEZING: 0
BRUISES/BLEEDS EASILY: 0
BLURRED VISION: 0

## 2025-03-27 ASSESSMENT — FIBROSIS 4 INDEX: FIB4 SCORE: 0.5

## 2025-03-27 ASSESSMENT — PAIN SCALES - GENERAL: PAINLEVEL_OUTOF10: NO PAIN

## 2025-03-27 NOTE — PROGRESS NOTES
Established Patient    Patient Care Team:  Quincy Varela M.D. as PCP - General (Internal Medicine)  Preferred home care  as Respiratory Therapist (DME Supplier)    HPI:  he patient is status post gastric sleeve surgery (robotic sleeve gastrectomy) performed in December 2023 and status post cholecystectomy in March 2024. Her medical history is significant for prediabetes, obstructive sleep apnea managed with CPAP, gastroesophageal reflux disease and dysphagia s/p EGD with esophageal dilatation who comes for follow-up of chronic conditions with labs.    The patient presents for follow-up of esophageal dysphagia, reporting a reduction in episodes of vomiting and swallowing difficulty following her recent endoscopic esophageal dilation. She states that her symptoms have improved overall. Her aunt, who is her current caregiver, confirms the improvement but notes that the patient has had 1-2 episodes of vomiting, likely related to overeating, rather than ongoing esophageal dysfunction. These episodes have been less frequent and less severe compared to prior. The aunt also expressed concern regarding the patient’s plan to move in with her sister, stating that the patient typically does not receive adequate care in that environment. Given the patient’s ongoing medical needs and history of requiring supportive care, this transition may present challenges to her health and continuity of care.    Other than that today the patient reports feeling well, without complaints or symptoms as nausea, acid reflux or recent episodes of vomiting.       Review of Systems   Constitutional:  Negative for chills, fever and weight loss.   HENT:  Negative for hearing loss, sinus pain, sore throat and tinnitus.    Eyes:  Negative for blurred vision, double vision, pain and redness.   Respiratory:  Negative for cough, shortness of breath and wheezing.    Cardiovascular:  Negative for chest pain, palpitations, orthopnea,  leg swelling and PND.   Gastrointestinal:  Negative for abdominal pain, constipation, diarrhea and heartburn.        Occasional nausea and vomits   Genitourinary:  Negative for dysuria and hematuria.   Musculoskeletal:  Negative for falls, joint pain and myalgias.   Skin:  Negative for itching and rash.   Neurological:  Negative for dizziness, tremors, focal weakness, seizures, weakness and headaches.   Endo/Heme/Allergies:  Does not bruise/bleed easily.   Psychiatric/Behavioral:  Negative for depression, hallucinations and memory loss. The patient is not nervous/anxious and does not have insomnia.    :    Past Medical History:   Diagnosis Date    ADHD (attention deficit hyperactivity disorder)     Anxiety     Chickenpox     Depression     Pt lost mother October 2023    Gastroesophageal reflux disease without esophagitis 7/26/2017    Headache 05/25/2016    Heart burn     Insomnia 05/25/2016    Learning disabilities     unable read or write    Morbid obesity (HCC) 05/25/2016    Nocturnal enuresis 05/25/2016    Obesity     Odynophagia 2/21/2018    DELIA (obstructive sleep apnea) 05/25/2016    Prediabetes 05/25/2016    Sleep apnea     CPAP    UTI (lower urinary tract infection) 05/25/2016       Social History     Tobacco Use    Smoking status: Never    Smokeless tobacco: Never   Vaping Use    Vaping status: Never Used   Substance Use Topics    Alcohol use: No    Drug use: No     Comment: pt denies       Current Outpatient Medications   Medication Sig Dispense Refill    clotrimazole (LOTRIMIN) 1 % Cream Apply 1 Application topically 2 times a day. 3 g 2    VITAMIN D PO Take 1 Tablet by mouth every day.         multivitamin Tab Take 1 Tablet by mouth every day.         CALCIUM PO Take 1 Tablet by mouth every day.         Cyanocobalamin (VITAMIN B 12 PO) Take 1 Tablet by mouth every day.         cyclobenzaprine (FLEXERIL) 5 mg tablet Take 1 Tablet by mouth every evening as needed for Muscle Spasms or Mild Pain. 15 Tablet 0  "    No current facility-administered medications for this visit.       /66 (BP Location: Left arm, Patient Position: Sitting, BP Cuff Size: Adult long)   Pulse 67   Temp 36.2 °C (97.2 °F) (Temporal)   Ht 1.372 m (4' 6\")   Wt 76.9 kg (169 lb 9.6 oz)   LMP 03/27/2025 (Exact Date)   SpO2 99%   Breastfeeding No   BMI 40.89 kg/m²   Physical Exam  Constitutional:       General: She is not in acute distress.     Appearance: Normal appearance.   HENT:      Head: Normocephalic.      Right Ear: Tympanic membrane normal.      Left Ear: Tympanic membrane normal.      Nose: Nose normal. No congestion or rhinorrhea.      Mouth/Throat:      Mouth: Mucous membranes are moist.   Eyes:      General: No scleral icterus.     Extraocular Movements: Extraocular movements intact.      Conjunctiva/sclera: Conjunctivae normal.      Pupils: Pupils are equal, round, and reactive to light.   Cardiovascular:      Rate and Rhythm: Normal rate and regular rhythm.      Pulses: Normal pulses.      Heart sounds: Normal heart sounds. No murmur heard.     No gallop.   Pulmonary:      Effort: Pulmonary effort is normal. No respiratory distress.      Breath sounds: Normal breath sounds. No wheezing, rhonchi or rales.   Chest:      Chest wall: No tenderness.   Abdominal:      General: Bowel sounds are normal. There is no distension.      Palpations: There is no mass.      Tenderness: There is no abdominal tenderness. There is no guarding or rebound.      Comments: Abdominal fat +++/+++   Genitourinary:     Rectum: Normal.   Musculoskeletal:         General: No swelling or deformity. Normal range of motion.      Cervical back: Normal range of motion and neck supple.      Right lower leg: No edema.      Left lower leg: No edema.   Lymphadenopathy:      Cervical: No cervical adenopathy.   Skin:     General: Skin is warm.      Capillary Refill: Capillary refill takes less than 2 seconds.      Coloration: Skin is not jaundiced or pale.      " Findings: No bruising, erythema or rash.   Neurological:      General: No focal deficit present.      Mental Status: She is alert and oriented to person, place, and time.      Sensory: No sensory deficit.      Motor: No weakness.      Gait: Gait normal.      Deep Tendon Reflexes: Reflexes normal.   Psychiatric:         Mood and Affect: Mood normal.         Behavior: Behavior normal.         Thought Content: Thought content normal.         Judgment: Judgment normal.           Assessment and Plan:     Esophageal stricture   Dysphagia  The patient reports improved symptoms of dysphagia and reduced postprandial vomiting following her recent esophageal dilation, which was performed in the context of prior bariatric surgery. She has completed follow-up with gastroenterology and will now continue her care with the bariatric surgery team. She notes that over the past few months, it has become more difficult to lose weight, which she attributes in part to suboptimal dietary habits. While her gastrointestinal symptoms have largely improved, she does report that overeating can still trigger vomiting episodes. She was counseled on the importance of eating small, frequent meals and avoiding large portion sizes to prevent recurrence of symptoms and support weight loss efforts. She understands the need for continued nutritional vigilance and agreed to reinforce these habits. The patient will continue regular follow-up with her bariatric surgery team, and we will coordinate care as needed to support her long-term metabolic and nutritional goals.    Plan:  -Continue follow-up with surgery.        3.Prediabetes  4.Morbid obesity with BMI of 40.0-44.9, adult (HCC)  The patient’s fasting glucose in March 2024 was 107 mg/dL, demonstrating continued improvement in glycemic control following her gastric bypass surgery. Her most recent hemoglobin A1c is slightly improved compared to prior results, and her blood glucose levels remain  well-controlled without the need for pharmacologic intervention. Although she has experienced weight loss post-surgery, her BMI remains elevated at 40.89, indicating persistent obesity. She was counseled on the importance of sustained lifestyle modifications, including adherence to a low-carbohydrate, low-fat diet, and engaging in at least 150 minutes of moderate-intensity physical activity per week, to support further weight reduction and ongoing metabolic improvement. However, according to her aunt, who assists in her care, the patient is often reluctant to reduce meal portions and continues to consume high-sugar and high-fat foods. After a thorough discussion, both the patient and her aunt agreed to a referral to nutrition services, which may provide structured guidance to support healthier eating habits and promote further weight loss. A referral to clinical nutrition has been placed, and follow-up will focus on monitoring weight trends, metabolic markers, and adherence to dietary recommendations.     Plan:  - Referral to nutrition  -Counseling and life style changes to progressively reduce weight.        5. Obstructive sleep apnea syndrome  Patient reports feeling well rested after hours of sleep using CPAP.  Now well-controlled with respiratory device during the night.  Patient does not have any complaints regarding this issue currently.     Plan:     -Continue CPAP     6. Tinea pedis of right foot (resolved)  The patient was previously seen for tinea pedis and was treated with topical clotrimazole. At today’s visit, she reports that her symptoms have completely resolved with the recommended treatment. There is no recurrence of itching, redness, or scaling, and no signs of secondary infection. No further intervention is needed at this time. The patient was advised to continue good foot hygiene and to keep feet dry to prevent recurrence.         Quincy Howell M.D., MPH. PGY-1 Internal  [facility]    This note was created using voice recognition software.  While every attempt is made to ensure accuracy of transcription, occasionally errors occur.

## 2025-04-03 NOTE — Clinical Note
REFERRAL APPROVAL NOTICE         Sent on April 3, 2025                   Christi Mcgarry  [address]  [place] [address]                   Dear Ms. Mcgarry,    After a careful review of the medical information and benefit coverage, [facility] has processed your referral. See below for additional details.    If applicable, you must be actively enrolled with your insurance for coverage of the authorized service. If you have any questions regarding your coverage, please contact your insurance directly.    REFERRAL INFORMATION   Referral #:  [id number]  Referred-To Department    Referred-By Provider:  Lexy Varela M.D.   [facility]      [address] [address]  [phone] [address] [id number] [phone]    Referral Start Date:  03/27/2025  Referral End Date:   03/27/2026             SCHEDULING  If you do not already have an appointment, please call [phone] to make an appointment.     MORE INFORMATION  If you do not already have a My[employer] account, sign up at: [email or web address]  You can access your medical information, make appointments, see lab results, billing information, and more.  If you have questions regarding this referral, please contact  the [facility] Referrals department at:             [phone]. Monday - Friday 8:00AM - 5:00PM.     Sincerely,    [facility]

## 2025-04-04 ENCOUNTER — OFFICE VISIT (OUTPATIENT)
Dept: INTERNAL MEDICINE | Facility: OTHER | Age: 35
End: 2025-04-04
Payer: MEDICAID

## 2025-04-04 VITALS
HEIGHT: 55 IN | HEART RATE: 60 BPM | SYSTOLIC BLOOD PRESSURE: 126 MMHG | DIASTOLIC BLOOD PRESSURE: 74 MMHG | TEMPERATURE: 97.6 F | WEIGHT: 168.4 LBS | BODY MASS INDEX: 38.97 KG/M2 | OXYGEN SATURATION: 99 %

## 2025-04-04 DIAGNOSIS — F81.9 LEARNING DISABILITY: ICD-10-CM

## 2025-04-04 DIAGNOSIS — Q90.9 DOWN SYNDROME: ICD-10-CM

## 2025-04-04 DIAGNOSIS — F79 INTELLECTUAL DISABILITY: ICD-10-CM

## 2025-04-04 PROCEDURE — 3074F SYST BP LT 130 MM HG: CPT | Mod: GE

## 2025-04-04 PROCEDURE — 1126F AMNT PAIN NOTED NONE PRSNT: CPT | Mod: GE

## 2025-04-04 PROCEDURE — 99213 OFFICE O/P EST LOW 20 MIN: CPT | Mod: GE

## 2025-04-04 PROCEDURE — 3078F DIAST BP <80 MM HG: CPT | Mod: GE

## 2025-04-04 ASSESSMENT — ENCOUNTER SYMPTOMS
WHEEZING: 0
PALPITATIONS: 0
EYE REDNESS: 0
HEADACHES: 0
DEPRESSION: 0
TREMORS: 0
SINUS PAIN: 0
HALLUCINATIONS: 0
WEAKNESS: 0
BRUISES/BLEEDS EASILY: 0
ABDOMINAL PAIN: 0
HEARTBURN: 0
MEMORY LOSS: 0
SHORTNESS OF BREATH: 0
CONSTIPATION: 0
EYE PAIN: 0
FOCAL WEAKNESS: 0
SORE THROAT: 0
DIZZINESS: 0
NERVOUS/ANXIOUS: 0
FALLS: 0
FEVER: 0
ORTHOPNEA: 0
DIARRHEA: 0
CHILLS: 0
INSOMNIA: 0
DOUBLE VISION: 0
COUGH: 0
SEIZURES: 0
WEIGHT LOSS: 0
MYALGIAS: 0
PND: 0
BLURRED VISION: 0

## 2025-04-04 ASSESSMENT — PAIN SCALES - GENERAL: PAINLEVEL_OUTOF10: NO PAIN

## 2025-04-04 ASSESSMENT — FIBROSIS 4 INDEX: FIB4 SCORE: 0.5

## 2025-04-04 NOTE — PROGRESS NOTES
Chief Complaint   Patient presents with    Paperwork     Paperwork for transportation     HISTORY OF PRESENT ILLNESS: Patient is a 34 y.o. female established patient of my colleague Dr Moore who presents today for the following.      # Paperwork for RTC needs  -Patient with known history of Down syndrome and intellectual disability/learning disability.  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  They note that patient will be moving into her own apartment, previously lives with her sister.  As such, patient will be needing transportation from her own apartment going to her work.  She has regular work every day. 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  Patient states that she is not able to read, but can write her name.  She cannot independently drive herself or even use the bus by herself and at risk for getting lost when she will go to work by herself.  They note that otherwise patient can take care of her basic needs other than transportation.      Past Medical History:   Diagnosis Date    ADHD (attention deficit hyperactivity disorder)     Anxiety     Chickenpox     Depression     Pt lost mother October 2023    Gastroesophageal reflux disease without esophagitis 7/26/2017    Headache 05/25/2016    Heart burn     Insomnia 05/25/2016    Learning disabilities     unable read or write    Morbid obesity (HCC) 05/25/2016    Nocturnal enuresis 05/25/2016    Obesity     Odynophagia 2/21/2018    DELIA (obstructive sleep apnea) 05/25/2016    Prediabetes 05/25/2016    Sleep apnea     CPAP    UTI (lower urinary tract infection) 05/25/2016       Patient Active Problem List    Diagnosis Date Noted    Other dysphagia 03/27/2025    Menorrhagia with irregular cycle 09/03/2024    Tinea pedis of right foot 09/03/2024    Class 3 severe obesity due to excess calories without serious comorbidity with body mass index (BMI) of 50.0 to 59.9 in adult (HCC) 03/01/2024    Scalp lump 03/01/2023    Down syndrome 04/20/2021     Prediabetes 05/25/2016    Vitamin D deficiency 05/25/2016    Obstructive sleep apnea syndrome 06/27/2014    Insomnia 06/27/2014    ADHD 06/27/2014       Patient has no known allergies.    Current Outpatient Medications   Medication Sig Dispense Refill    clotrimazole (LOTRIMIN) 1 % Cream Apply 1 Application topically 2 times a day. 3 g 2    multivitamin Tab Take 1 Tablet by mouth every day.         CALCIUM PO Take 1 Tablet by mouth every day.         Cyanocobalamin (VITAMIN B 12 PO) Take 1 Tablet by mouth every day.          No current facility-administered medications for this visit.       Social History     Tobacco Use    Smoking status: Never    Smokeless tobacco: Never   Vaping Use    Vaping status: Never Used   Substance Use Topics    Alcohol use: No    Drug use: No     Comment: pt denies       Family History   Problem Relation Age of Onset    Sleep Apnea Neg Hx        Review of Systems   Constitutional:  Negative for chills, fever and weight loss.   HENT:  Negative for hearing loss, sinus pain, sore throat and tinnitus.    Eyes:  Negative for blurred vision, double vision, pain and redness.   Respiratory:  Negative for cough, shortness of breath and wheezing.    Cardiovascular:  Negative for chest pain, palpitations, orthopnea, leg swelling and PND.   Gastrointestinal:  Negative for abdominal pain, constipation, diarrhea and heartburn.   Genitourinary:  Negative for dysuria and hematuria.   Musculoskeletal:  Negative for falls, joint pain and myalgias.   Skin:  Negative for itching and rash.   Neurological:  Negative for dizziness, tremors, focal weakness, seizures, weakness and headaches.   Endo/Heme/Allergies:  Does not bruise/bleed easily.   Psychiatric/Behavioral:  Negative for depression, hallucinations and memory loss. The patient is not nervous/anxious and does not have insomnia.        Exam:  /74 (BP Location: Left arm, Patient Position: Sitting, BP Cuff Size: Adult long)   Pulse 60   Temp 36.4  "°C (97.6 °F) (Temporal)   Ht 1.372 m (4' 6\")   Wt 76.4 kg (168 lb 6.4 oz)   SpO2 99%  Body mass index is 40.6 kg/m².    Constitutional:  Not in acute distress, well appearing.  HEENT:   NC/AT  Cardiovascular: Regular rate and rhythm. No murmurs or gallops.      Lungs:   Clear to auscultation bilaterally. No wheezes or crackles. No respiratory distress.  Abdomen: Not distended, soft, not tender. No guarding or rigidity. No masses.  Extremities:  No cyanosis/clubbing/edema. No obvious deformities.  Skin:  Warm and dry.  No visible rashes.  Neurologic: Alert & oriented x 3, strength and sensation grossly intact.  No focal deficits noted.  Psychiatric:  Affect normal, mood normal, judgment normal.    Assessment/Plan:     Down syndrome with intellectual disability        Learning disability  -Patient able to do ADLs but is dependent for transportation needs  -She qualifies for RTC transportation service to go from her house to her work and vice versa.  Due to her Down syndrome and eventual disability and learning disability, she has high risk for getting lost and concerns for safety  -Paperwork provided, please refer to media section for reference    All imaging results and lab results and consult notes are reviewed at this visit.  Followup: Return in about 6 months (around 10/4/2025).  Follow-up with PCP    Please note that this dictation was created using voice recognition software. I have made every reasonable attempt to correct obvious errors, but I expect that there are errors of grammar and possibly content that I did not discover before finalizing the note.    ALYSE WHITE MD  PGY-3  "

## 2025-05-05 ENCOUNTER — APPOINTMENT (OUTPATIENT)
Dept: INTERNAL MEDICINE | Facility: OTHER | Age: 35
End: 2025-05-05
Payer: MEDICAID

## 2025-05-21 ENCOUNTER — APPOINTMENT (OUTPATIENT)
Dept: INTERNAL MEDICINE | Facility: OTHER | Age: 35
End: 2025-05-21
Payer: MEDICAID

## 2025-05-28 ENCOUNTER — OFFICE VISIT (OUTPATIENT)
Dept: INTERNAL MEDICINE | Facility: OTHER | Age: 35
End: 2025-05-28
Payer: MEDICAID

## 2025-05-28 VITALS
OXYGEN SATURATION: 97 % | DIASTOLIC BLOOD PRESSURE: 66 MMHG | HEIGHT: 55 IN | WEIGHT: 174.6 LBS | HEART RATE: 64 BPM | SYSTOLIC BLOOD PRESSURE: 106 MMHG | BODY MASS INDEX: 40.41 KG/M2 | TEMPERATURE: 97.7 F

## 2025-05-28 DIAGNOSIS — G56.01 CARPAL TUNNEL SYNDROME OF RIGHT WRIST: ICD-10-CM

## 2025-05-28 DIAGNOSIS — R20.0 NUMBNESS AND TINGLING IN RIGHT HAND: Primary | ICD-10-CM

## 2025-05-28 DIAGNOSIS — R20.2 NUMBNESS AND TINGLING IN RIGHT HAND: Primary | ICD-10-CM

## 2025-05-28 ASSESSMENT — ENCOUNTER SYMPTOMS
SENSORY CHANGE: 1
MUSCULOSKELETAL NEGATIVE: 1
GASTROINTESTINAL NEGATIVE: 1
PSYCHIATRIC NEGATIVE: 1
CONSTITUTIONAL NEGATIVE: 1
RESPIRATORY NEGATIVE: 1
EYES NEGATIVE: 1
CARDIOVASCULAR NEGATIVE: 1

## 2025-05-28 ASSESSMENT — FIBROSIS 4 INDEX: FIB4 SCORE: 0.5

## 2025-05-28 NOTE — PROGRESS NOTES
"    Established Patient    Patient Care Team:  Quincy Varela M.D. as PCP - General (Internal Medicine)  Preferred home care  as Respiratory Therapist (DME Supplier)    Christi Mcgarry is a 34 y.o. female who presents today with the following Chief Complaint(s): Follow up for The primary encounter diagnosis was Numbness and tingling in right hand. A diagnosis of Carpal tunnel syndrome of right wrist was also pertinent to this visit.    HPI:  Chang Mcgarry is a 33 y/o female patient who presented for an acute visit with complaints of several months of right hand numbness. 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 The pattern of numbness occurs over the dorsal surface of the right hand, as well as the lateral 3 fingers of the right hand. She reports that with use, the numbness worsens, and improves with rest. On physical exam, there is no thenar muscle atrophy, hands appear normal bilaterally w/o strictures, muscle wasting. Phalen test positive, tinel sign negative. There is no other numbness/tingling at any other region, and patient denies changes in vision, slurred speech, lightheadedness, dizziness, pre/syncopal episodes, difficulty ambulating.     /66 (BP Location: Left arm, Patient Position: Sitting, BP Cuff Size: Adult)   Pulse 64   Temp 36.5 °C (97.7 °F) (Temporal)   Ht 1.372 m (4' 6\")   Wt 79.2 kg (174 lb 9.6 oz)   SpO2 97%   BMI 42.10 kg/m²   Review of Systems   Constitutional: Negative.    HENT: Negative.     Eyes: Negative.    Respiratory: Negative.     Cardiovascular: Negative.    Gastrointestinal: Negative.    Genitourinary: Negative.    Musculoskeletal: Negative.    Skin: Negative.    Neurological:  Positive for sensory change.   Endo/Heme/Allergies: Negative.    Psychiatric/Behavioral: Negative.         Past Medical History[1]  Social History[2]  Current " Medications[3]      Physical Exam  Constitutional:       Appearance: Normal appearance.   HENT:      Head: Normocephalic and atraumatic.      Right Ear: External ear normal.      Left Ear: External ear normal.      Nose: Nose normal.      Mouth/Throat:      Mouth: Mucous membranes are moist.   Eyes:      Extraocular Movements: Extraocular movements intact.      Conjunctiva/sclera: Conjunctivae normal.   Cardiovascular:      Rate and Rhythm: Normal rate and regular rhythm.      Pulses: Normal pulses.      Heart sounds: Normal heart sounds.   Pulmonary:      Effort: Pulmonary effort is normal.      Breath sounds: Normal breath sounds.   Abdominal:      General: Bowel sounds are normal.      Palpations: Abdomen is soft.   Musculoskeletal:         General: Normal range of motion.      Cervical back: Normal range of motion and neck supple.   Skin:     General: Skin is warm.   Neurological:      General: No focal deficit present.      Mental Status: She is alert and oriented to person, place, and time. Mental status is at baseline.      Sensory: Sensory deficit present.      Comments: Phalen sign positive, no numbness at rest. Numbness induced with Phalen test is relieved with rest.   Psychiatric:         Mood and Affect: Mood normal.         Behavior: Behavior normal.         Thought Content: Thought content normal.         Judgment: Judgment normal.         Assessment and Plan:   1. Numbness and tingling in right hand (Primary)  2. Carpal tunnel syndrome of right wrist  In the setting of presentation of numbness with extensive use of her right hand, patient is likely suffering from carpal tunnel syndrome. Counseled on presentation of carpal tunnel, treatment modalities. Discussed the use of a wrist splint during work hours, which she reports she will be able to do, and discussed the use of NSAIDs PRN for management of numbness. At this time, due to symptoms being mild, will manage conservatively with OTC wrist  brace/splint. Patient provided with pharmacies that would have splints in stock, as well as a picture of splints to simplify process. F/u with PCP for evaluation for improvement/worsening.      No problem-specific Assessment & Plan notes found for this encounter.      No orders of the defined types were placed in this encounter.      No follow-ups on file.    Odessa Grant M.D. PGY II  Internal Medicine  [facility]       [1]   Past Medical History:  Diagnosis Date    ADHD (attention deficit hyperactivity disorder)     Anxiety     Chickenpox     Depression     Pt lost mother October 2023    Gastroesophageal reflux disease without esophagitis 7/26/2017    Headache 05/25/2016    Heart burn     Insomnia 05/25/2016    Learning disabilities     unable read or write    Morbid obesity (HCC) 05/25/2016    Nocturnal enuresis 05/25/2016    Obesity     Odynophagia 2/21/2018    DELIA (obstructive sleep apnea) 05/25/2016    Prediabetes 05/25/2016    Sleep apnea     CPAP    UTI (lower urinary tract infection) 05/25/2016   [2]   Social History  Tobacco Use    Smoking status: Never    Smokeless tobacco: Never   Vaping Use    Vaping status: Never Used   Substance Use Topics    Alcohol use: No    Drug use: No     Comment: pt denies   [3]   Current Outpatient Medications   Medication Sig Dispense Refill    clotrimazole (LOTRIMIN) 1 % Cream Apply 1 Application topically 2 times a day. 3 g 2    multivitamin Tab Take 1 Tablet by mouth every day.         CALCIUM PO Take 1 Tablet by mouth every day.         Cyanocobalamin (VITAMIN B 12 PO) Take 1 Tablet by mouth every day.          No current facility-administered medications for this visit.

## 2025-06-17 ENCOUNTER — APPOINTMENT (OUTPATIENT)
Dept: INTERNAL MEDICINE | Facility: OTHER | Age: 35
End: 2025-06-17
Payer: MEDICAID

## 2025-07-18 ENCOUNTER — OFFICE VISIT (OUTPATIENT)
Dept: INTERNAL MEDICINE | Facility: OTHER | Age: 35
End: 2025-07-18
Payer: MEDICAID

## 2025-07-18 ENCOUNTER — HOSPITAL ENCOUNTER (OUTPATIENT)
Dept: LAB | Facility: MEDICAL CENTER | Age: 35
End: 2025-07-18
Payer: MEDICAID

## 2025-07-18 ENCOUNTER — APPOINTMENT (OUTPATIENT)
Dept: INTERNAL MEDICINE | Facility: OTHER | Age: 35
End: 2025-07-18
Payer: MEDICAID

## 2025-07-18 VITALS
SYSTOLIC BLOOD PRESSURE: 102 MMHG | BODY MASS INDEX: 40.13 KG/M2 | OXYGEN SATURATION: 97 % | HEIGHT: 55 IN | WEIGHT: 173.4 LBS | DIASTOLIC BLOOD PRESSURE: 63 MMHG | HEART RATE: 58 BPM | TEMPERATURE: 98 F

## 2025-07-18 DIAGNOSIS — Z02.5 ROUTINE SPORTS PHYSICAL EXAM: Primary | ICD-10-CM

## 2025-07-18 DIAGNOSIS — R11.11 VOMITING WITHOUT NAUSEA, UNSPECIFIED VOMITING TYPE: ICD-10-CM

## 2025-07-18 LAB
25(OH)D3 SERPL-MCNC: 28 NG/ML (ref 30–100)
ALBUMIN SERPL BCP-MCNC: 3.8 G/DL (ref 3.2–4.9)
ALBUMIN/GLOB SERPL: 1.3 G/DL
ALP SERPL-CCNC: 83 U/L (ref 30–99)
ALT SERPL-CCNC: 14 U/L (ref 2–50)
ANION GAP SERPL CALC-SCNC: 10 MMOL/L (ref 7–16)
AST SERPL-CCNC: 20 U/L (ref 12–45)
BASOPHILS # BLD AUTO: 0.7 % (ref 0–1.8)
BASOPHILS # BLD: 0.05 K/UL (ref 0–0.12)
BILIRUB SERPL-MCNC: 0.4 MG/DL (ref 0.1–1.5)
BUN SERPL-MCNC: 11 MG/DL (ref 8–22)
CALCIUM ALBUM COR SERPL-MCNC: 9 MG/DL (ref 8.5–10.5)
CALCIUM SERPL-MCNC: 8.8 MG/DL (ref 8.5–10.5)
CHLORIDE SERPL-SCNC: 106 MMOL/L (ref 96–112)
CHOLEST SERPL-MCNC: 133 MG/DL (ref 100–199)
CO2 SERPL-SCNC: 23 MMOL/L (ref 20–33)
CREAT SERPL-MCNC: 0.57 MG/DL (ref 0.5–1.4)
EOSINOPHIL # BLD AUTO: 0.18 K/UL (ref 0–0.51)
EOSINOPHIL NFR BLD: 2.5 % (ref 0–6.9)
ERYTHROCYTE [DISTWIDTH] IN BLOOD BY AUTOMATED COUNT: 44.2 FL (ref 35.9–50)
FASTING STATUS PATIENT QL REPORTED: NORMAL
FERRITIN SERPL-MCNC: 22.4 NG/ML (ref 10–291)
FOLATE SERPL-MCNC: 19.8 NG/ML
GFR SERPLBLD CREATININE-BSD FMLA CKD-EPI: 122 ML/MIN/1.73 M 2
GLOBULIN SER CALC-MCNC: 3 G/DL (ref 1.9–3.5)
GLUCOSE SERPL-MCNC: 89 MG/DL (ref 65–99)
HCT VFR BLD AUTO: 40.7 % (ref 37–47)
HDLC SERPL-MCNC: 40 MG/DL
HGB BLD-MCNC: 12.6 G/DL (ref 12–16)
IMM GRANULOCYTES # BLD AUTO: 0.02 K/UL (ref 0–0.11)
IMM GRANULOCYTES NFR BLD AUTO: 0.3 % (ref 0–0.9)
IRON SERPL-MCNC: 31 UG/DL (ref 40–170)
LDLC SERPL CALC-MCNC: 68 MG/DL
LYMPHOCYTES # BLD AUTO: 2.37 K/UL (ref 1–4.8)
LYMPHOCYTES NFR BLD: 32.3 % (ref 22–41)
MCH RBC QN AUTO: 25.3 PG (ref 27–33)
MCHC RBC AUTO-ENTMCNC: 31 G/DL (ref 32.2–35.5)
MCV RBC AUTO: 81.6 FL (ref 81.4–97.8)
MONOCYTES # BLD AUTO: 0.8 K/UL (ref 0–0.85)
MONOCYTES NFR BLD AUTO: 10.9 % (ref 0–13.4)
NEUTROPHILS # BLD AUTO: 3.91 K/UL (ref 1.82–7.42)
NEUTROPHILS NFR BLD: 53.3 % (ref 44–72)
NRBC # BLD AUTO: 0 K/UL
NRBC BLD-RTO: 0 /100 WBC (ref 0–0.2)
PLATELET # BLD AUTO: 297 K/UL (ref 164–446)
PMV BLD AUTO: 11.1 FL (ref 9–12.9)
POTASSIUM SERPL-SCNC: 3.9 MMOL/L (ref 3.6–5.5)
PREALB SERPL-MCNC: 14.9 MG/DL (ref 18–38)
PROT SERPL-MCNC: 6.8 G/DL (ref 6–8.2)
RBC # BLD AUTO: 4.99 M/UL (ref 4.2–5.4)
SODIUM SERPL-SCNC: 139 MMOL/L (ref 135–145)
TRANSFERRIN SERPL-MCNC: 267 MG/DL (ref 200–370)
TRIGL SERPL-MCNC: 125 MG/DL (ref 0–149)
VIT B12 SERPL-MCNC: 631 PG/ML (ref 211–911)
WBC # BLD AUTO: 7.3 K/UL (ref 4.8–10.8)

## 2025-07-18 PROCEDURE — 85025 COMPLETE CBC W/AUTO DIFF WBC: CPT

## 2025-07-18 PROCEDURE — 84252 ASSAY OF VITAMIN B-2: CPT

## 2025-07-18 PROCEDURE — 3078F DIAST BP <80 MM HG: CPT

## 2025-07-18 PROCEDURE — 80053 COMPREHEN METABOLIC PANEL: CPT

## 2025-07-18 PROCEDURE — 84630 ASSAY OF ZINC: CPT

## 2025-07-18 PROCEDURE — 82306 VITAMIN D 25 HYDROXY: CPT

## 2025-07-18 PROCEDURE — 3074F SYST BP LT 130 MM HG: CPT

## 2025-07-18 PROCEDURE — 84134 ASSAY OF PREALBUMIN: CPT

## 2025-07-18 PROCEDURE — 80061 LIPID PANEL: CPT

## 2025-07-18 PROCEDURE — 84425 ASSAY OF VITAMIN B-1: CPT

## 2025-07-18 PROCEDURE — 84207 ASSAY OF VITAMIN B-6: CPT

## 2025-07-18 PROCEDURE — 83540 ASSAY OF IRON: CPT

## 2025-07-18 PROCEDURE — 82728 ASSAY OF FERRITIN: CPT

## 2025-07-18 PROCEDURE — 36415 COLL VENOUS BLD VENIPUNCTURE: CPT

## 2025-07-18 PROCEDURE — 99213 OFFICE O/P EST LOW 20 MIN: CPT | Mod: GE

## 2025-07-18 PROCEDURE — 82607 VITAMIN B-12: CPT

## 2025-07-18 PROCEDURE — 84466 ASSAY OF TRANSFERRIN: CPT

## 2025-07-18 PROCEDURE — 82746 ASSAY OF FOLIC ACID SERUM: CPT

## 2025-07-18 ASSESSMENT — ENCOUNTER SYMPTOMS
POLYDIPSIA: 0
WEIGHT LOSS: 0
BRUISES/BLEEDS EASILY: 0
NAUSEA: 0
NERVOUS/ANXIOUS: 0
COUGH: 0
CLAUDICATION: 0
CONSTIPATION: 0
FEVER: 0
ORTHOPNEA: 0
HALLUCINATIONS: 0
TINGLING: 0
SHORTNESS OF BREATH: 0
TREMORS: 0
DEPRESSION: 0
SENSORY CHANGE: 0
VOMITING: 0
SPEECH CHANGE: 0
NECK PAIN: 0
CHILLS: 0
HEADACHES: 0
EYES NEGATIVE: 1
DIARRHEA: 0
SPUTUM PRODUCTION: 0
PALPITATIONS: 0
DIZZINESS: 0
MYALGIAS: 0
HEMOPTYSIS: 0
ABDOMINAL PAIN: 0
BACK PAIN: 0

## 2025-07-18 ASSESSMENT — LIFESTYLE VARIABLES: SUBSTANCE_ABUSE: 0

## 2025-07-18 ASSESSMENT — FIBROSIS 4 INDEX: FIB4 SCORE: 0.5

## 2025-07-18 NOTE — PROGRESS NOTES
Follow Up      Chief Complaint: Sports Clearance    Last Seen: 5/28/25 by Dr. Hendrix     History of Present Illness:   Christi Mcgarry is a 34 y.o. female with PMH of  status post gastric sleeve surgery (robotic sleeve gastrectomy), cholecystectomy in March 2024. Her medical history is significant for prediabetes, obstructive sleep apnea managed with CPAP, gastroesophageal reflux disease and dysphagia s/p EGD with esophageal dilatation who presents for a sports physical and clearance.     The patient is here for a physical examination required for sports clearance to participate in a bowling program starting next Wednesday. There are no specific forms provided by the program, but a letter of clearance is requested. During the appointment, I inquired about any history of cardiac issues, asthma, fainting during sports, palpitations, chest pain, and any lung conditions. Patient also reports no specific issues with exercise or physical activity in the past. The patient reports not having any of these symptoms. A review of systems is negative for cardiac, pulmonary, or musculoskeletal complaints.      Review of Systems:  Review of Systems   Constitutional:  Negative for chills, fever, malaise/fatigue and weight loss.   HENT: Negative.     Eyes: Negative.    Respiratory:  Negative for cough, hemoptysis, sputum production and shortness of breath.    Cardiovascular:  Negative for chest pain, palpitations, orthopnea, claudication and leg swelling.   Gastrointestinal:  Negative for abdominal pain, constipation, diarrhea, nausea and vomiting.   Genitourinary:  Negative for dysuria, frequency and urgency.   Musculoskeletal:  Negative for back pain, joint pain, myalgias and neck pain.   Skin:  Negative for itching and rash.   Neurological:  Negative for dizziness, tingling, tremors, sensory change, speech change and headaches.   Endo/Heme/Allergies:  Negative for environmental allergies and polydipsia. Does not  "bruise/bleed easily.   Psychiatric/Behavioral:  Negative for depression, hallucinations, substance abuse and suicidal ideas. The patient is not nervous/anxious.    All other systems reviewed and are negative.       Past Medical History:   Past Medical History[1]    Patient Active Problem List    Diagnosis Date Noted    Other dysphagia 03/27/2025    Menorrhagia with irregular cycle 09/03/2024    Tinea pedis of right foot 09/03/2024    Class 3 severe obesity due to excess calories without serious comorbidity with body mass index (BMI) of 50.0 to 59.9 in adult 03/01/2024    Scalp lump 03/01/2023    Down syndrome 04/20/2021    Prediabetes 05/25/2016    Vitamin D deficiency 05/25/2016    Obstructive sleep apnea syndrome 06/27/2014    Insomnia 06/27/2014    ADHD 06/27/2014       Past Surgical History:   Past Surgical History[2]     Allergies:  Patient has no known allergies.    Medications:     Current Outpatient Medications:     clotrimazole, 1 Application, Topical, BID, Taking    multivitamin, 1 Tablet, Oral, DAILY, Taking    CALCIUM PO, 1 Tablet, Oral, DAILY, Taking    Cyanocobalamin (VITAMIN B 12 PO), 1 Tablet, Oral, DAILY, Taking     Social History:   Social History[3]    Family History:   Family History   Problem Relation Age of Onset    Sleep Apnea Neg Hx        Objective:  Vitals:   /63 (BP Location: Left arm, Patient Position: Sitting, BP Cuff Size: Adult)   Pulse (!) 58   Temp 36.7 °C (98 °F) (Temporal)   Ht 1.372 m (4' 6.02\")   Wt 78.7 kg (173 lb 6.4 oz)   SpO2 97%  Body mass index is 41.78 kg/m².    Physical Exam:   Physical Exam  Vitals reviewed.   Constitutional:       Appearance: Normal appearance.   HENT:      Head: Normocephalic and atraumatic.      Nose: Nose normal.      Mouth/Throat:      Mouth: Mucous membranes are moist.      Pharynx: Oropharynx is clear.   Eyes:      Extraocular Movements: Extraocular movements intact.      Conjunctiva/sclera: Conjunctivae normal.      Pupils: Pupils are " equal, round, and reactive to light.   Cardiovascular:      Rate and Rhythm: Normal rate and regular rhythm.      Pulses: Normal pulses.      Heart sounds: Normal heart sounds. No murmur heard.  Pulmonary:      Effort: Pulmonary effort is normal.      Breath sounds: Normal breath sounds. No wheezing, rhonchi or rales.   Abdominal:      General: Abdomen is flat. Bowel sounds are normal. There is no distension.      Palpations: Abdomen is soft. There is no mass.      Tenderness: There is no abdominal tenderness.   Musculoskeletal:         General: Normal range of motion.      Right lower leg: No edema.      Left lower leg: No edema.   Skin:     General: Skin is warm and dry.      Capillary Refill: Capillary refill takes less than 2 seconds.      Findings: No lesion or rash.   Neurological:      General: No focal deficit present.      Mental Status: She is alert and oriented to person, place, and time. Mental status is at baseline.      Cranial Nerves: No cranial nerve deficit.      Motor: No weakness.          Results:  Labs and imaging relevant to this visit were reviewed.     Assessment and Plan:    34 y.o. female with:     1. Routine sports physical exam (Primary)  Patient with no significant conditions that would preclude her from engaging in sports.  Physical exam was unremarkable.  Patient with no significant family history of early cardiac death.  No cardiac or lung conditions.  Patient ambulatory and able to participate  -Signed letter for patient clearing her to participate in sports, explained that if this was insufficient and there was further paperwork that they could return next week        No follow-ups on file.    Marcio Gilbert MD  Internal Medicine PGY-1  [employer]        [1]   Past Medical History:  Diagnosis Date    ADHD (attention deficit hyperactivity disorder)     Anxiety     Chickenpox     Depression     Pt lost mother October 2023    Gastroesophageal reflux  disease without esophagitis 7/26/2017    Headache 05/25/2016    Heart burn     Insomnia 05/25/2016    Learning disabilities     unable read or write    Morbid obesity (HCC) 05/25/2016    Nocturnal enuresis 05/25/2016    Obesity     Odynophagia 2/21/2018    DELIA (obstructive sleep apnea) 05/25/2016    Prediabetes 05/25/2016    Sleep apnea     CPAP    UTI (lower urinary tract infection) 05/25/2016   [2]   Past Surgical History:  Procedure Laterality Date    MO UPPER GI ENDOSCOPY,DIAGNOSIS  12/4/2024    Procedure: ESOPHAGOGASTRODUODENOSCOPY (EGD) WITH DILATATION;  Surgeon: Marquise Barfield M.D.;  Location: SURGERY [facility];  Service: General    CHOLECYSTECTOMY ROBOTIC XI N/A 3/29/2024    Procedure: CHOLECYSTECTOMY, ROBOT-ASSISTED, USING DA IVAN XI;  Surgeon: Bill Collins M.D.;  Location: SURGERY [facility];  Service: Gen Robotic    MO LAP DAVID RESTRICT PROC LONGITUDINAL GAS* N/A 12/22/2023    Procedure: ROBOTIC SLEEVE GASTRECTOMY;  Surgeon: Bill Collins M.D.;  Location: SURGERY [facility];  Service: Gen Robotic    LIVER BIOPSY N/A 12/22/2023    Procedure: BIOPSY, LIVER;  Surgeon: Bill Collins M.D.;  Location: SURGERY [facility];  Service: Gen Robotic    TONSILLECTOMY AND ADENOIDECTOMY  4/15/2013    Performed by Nadeem Briceno M.D. at SURGERY SAME DAY [facility]   [3]   Social History  Tobacco Use    Smoking status: Never    Smokeless tobacco: Never   Vaping Use    Vaping status: Never Used   Substance Use Topics    Alcohol use: No    Drug use: No     Comment: pt denies

## 2025-07-18 NOTE — LETTER
July 18, 2025    To Whom It May Concern:         This is confirmation that Christi Mcgarry attended her scheduled appointment with Marcio Gilbert M.D. on 7/18/25 and was evaluated for a physical. She is cleared to participate in sports from a medical perspective.          If you have any questions please do not hesitate to call me at the phone number listed below.    Sincerely,          Marcio Gilbert M.D.  [phone]

## 2025-07-20 LAB — ZINC SERPL-MCNC: 68.2 UG/DL (ref 60–120)

## 2025-07-21 LAB — VIT B2 SERPL-SCNC: 12 NMOL/L (ref 5–50)

## 2025-07-22 LAB — VIT B1 BLD-MCNC: 161 NMOL/L (ref 70–180)

## 2025-07-23 LAB — VIT B6 SERPL-MCNC: 37.5 NMOL/L (ref 20–125)

## 2025-07-28 ENCOUNTER — APPOINTMENT (OUTPATIENT)
Dept: INTERNAL MEDICINE | Facility: OTHER | Age: 35
End: 2025-07-28
Payer: MEDICAID

## 2025-08-31 ENCOUNTER — OFFICE VISIT (OUTPATIENT)
Dept: URGENT CARE | Facility: CLINIC | Age: 35
End: 2025-08-31
Payer: MEDICAID

## 2025-08-31 ENCOUNTER — APPOINTMENT (OUTPATIENT)
Dept: RADIOLOGY | Facility: IMAGING CENTER | Age: 35
End: 2025-08-31
Attending: NURSE PRACTITIONER
Payer: MEDICAID

## 2025-08-31 VITALS
HEART RATE: 78 BPM | RESPIRATION RATE: 16 BRPM | TEMPERATURE: 97.3 F | SYSTOLIC BLOOD PRESSURE: 110 MMHG | HEIGHT: 55 IN | OXYGEN SATURATION: 98 % | WEIGHT: 174 LBS | DIASTOLIC BLOOD PRESSURE: 78 MMHG | BODY MASS INDEX: 40.27 KG/M2

## 2025-08-31 DIAGNOSIS — M25.572 ACUTE LEFT ANKLE PAIN: ICD-10-CM

## 2025-08-31 DIAGNOSIS — S93.402A SPRAIN OF LEFT ANKLE, INITIAL ENCOUNTER: Primary | ICD-10-CM

## 2025-08-31 RX ORDER — IBUPROFEN 400 MG/1
400 TABLET, FILM COATED ORAL EVERY 8 HOURS PRN
Qty: 90 TABLET | Refills: 0 | Status: SHIPPED | OUTPATIENT
Start: 2025-08-31

## 2025-08-31 ASSESSMENT — FIBROSIS 4 INDEX: FIB4 SCORE: 0.59

## 2025-08-31 ASSESSMENT — ENCOUNTER SYMPTOMS
TINGLING: 0
CHILLS: 0
SENSORY CHANGE: 0
FOCAL WEAKNESS: 0
FEVER: 0

## 2025-09-09 ENCOUNTER — APPOINTMENT (OUTPATIENT)
Dept: INTERNAL MEDICINE | Facility: OTHER | Age: 35
End: 2025-09-09
Payer: MEDICAID

## (undated) DEVICE — SEAL 5MM-8MM UNIVERSAL  BOX OF 10

## (undated) DEVICE — SUTURE 1 STRATAFIX SYMMETRIC PDS PLUS CT-1 45CM (12EA/BX)

## (undated) DEVICE — CATHERTER CLEAR SINGLE USE INJECTION THERAPY NEEDLE 25GA X 4MM 2.3MM X 240CM (5EA/BX)

## (undated) DEVICE — SUTURE 2-0 30CM STRATAFIX SPIRAL PDO ***WAS PART #SXPD1B401 *****

## (undated) DEVICE — DERMABOND ADVANCED - (12EA/BX)

## (undated) DEVICE — BAG RETRIEVAL 10ML (10EA/BX)

## (undated) DEVICE — BASIN EMESIS DISP. - (250/CA)

## (undated) DEVICE — CANISTER SUCTION 3000ML MECHANICAL FILTER AUTO SHUTOFF MEDI-VAC NONSTERILE LF DISP (40EA/CA)

## (undated) DEVICE — GLOVE BIOGEL PI INDICATOR SZ 8.0 SURGICAL PF LF -(50/BX 4BX/CA)

## (undated) DEVICE — GOWN SURGEONS LARGE - (32/CA)

## (undated) DEVICE — LACTATED RINGERS INJ 1000 ML - (14EA/CA 60CA/PF)

## (undated) DEVICE — SENSOR OXIMETER ADULT SPO2 RD SET (20EA/BX)

## (undated) DEVICE — SUCTION INSTRUMENT YANKAUER BULBOUS TIP W/O VENT (50EA/CA)

## (undated) DEVICE — SOLUTION PREP PVP IODINE 3/4 OZ POUCH PACKET CONTAINER STERILE LATEX FREE

## (undated) DEVICE — SHEARS MONOPOLAR CURVED  DA VINCI 10X'S REUSABLE

## (undated) DEVICE — SYSTEM CLEARIFY VISUALIZATION (10EA/PK)

## (undated) DEVICE — GLOVE BIOGEL PI INDICATOR SZ 6.5 SURGICAL PF LF - (50/BX 4BX/CA)

## (undated) DEVICE — SUTURE 4-0 MONOCRYL PLUS PS-1 - 27 INCH (36/BX)

## (undated) DEVICE — SUTURE 3-0 STRATAFIX SPIRAL PDS PLUS SH 15CM (12EA/BX)

## (undated) DEVICE — DRAPE COLUMN  BOX OF 20

## (undated) DEVICE — NEEDLE NON SAFETY 25 GA X 1 1/2 IN HYPO (100EA/BX)

## (undated) DEVICE — SYRINGE 30 ML LS (56/BX)

## (undated) DEVICE — REDUCER XI STAPLER 12MM TO 8MM (6EA/BX)

## (undated) DEVICE — NEEDLE DRIVER MEGA SUTURECUT DA VINCI 15X'S REUSABLE

## (undated) DEVICE — SLEEVE VASO CALF MED - (10PR/CA)

## (undated) DEVICE — GLOVE BIOGEL INDICATOR SZ 8 SURGICAL PF LTX - (50/BX 4BX/CA)

## (undated) DEVICE — Device

## (undated) DEVICE — OBTURATOR BLADELESS STANDARD 8MM (6EA/BX)

## (undated) DEVICE — CLIP HEMOLOCK PURPLE - (14/BX)

## (undated) DEVICE — ELECTRODE DUAL RETURN W/ CORD - (50/PK)

## (undated) DEVICE — SYRINGE DISP. 50CC LS - (40/BX)

## (undated) DEVICE — GLOVE BIOGEL SZ 7.5 SURGICAL PF LTX - (50PR/BX 4BX/CA)

## (undated) DEVICE — DRAPE ARM  BOX OF 20

## (undated) DEVICE — SYRINGE ALLIANCE INFLATION (5EA/BX)

## (undated) DEVICE — COVER LIGHT HANDLE ALC PLUS DISP (18EA/BX)

## (undated) DEVICE — GLOVE SZ 8 BIOGEL PI MICRO - PF LF (50PR/BX)

## (undated) DEVICE — SET EXTENSION WITH 2 PORTS (48EA/CA) ***PART #2C8610 IS A SUBSTITUTE*****

## (undated) DEVICE — RELOAD STAPLER SUREFORM 60 WHITE (12EA/BX)

## (undated) DEVICE — CHLORAPREP 26 ML APPLICATOR - ORANGE TINT(25/CA)

## (undated) DEVICE — CANISTER SUCTION RIGID RED 1500CC (40EA/CA)

## (undated) DEVICE — STOPCOCK MALE 4-WAY - (50/CA)

## (undated) DEVICE — PAD OR TABLE DA VINCI 2IN X 20IN X 72IN - (12EA/CA)

## (undated) DEVICE — MAT PATIENT POSITIONING PREVALON (10EA/CA)

## (undated) DEVICE — SEALER VESSEL EXTEND FROM DAVINCI ENERGY (6EA/BX)

## (undated) DEVICE — IRRIGATOR SUCTION ENDOWRIST DISPOSABLE OD8 MM (6EA/BX)

## (undated) DEVICE — SYRINGE 30 ML LL (56/BX)

## (undated) DEVICE — SET LEADWIRE 5 LEAD BEDSIDE DISPOSABLE ECG (1SET OF 5/EA)

## (undated) DEVICE — COVER TIP ENDOWRIST HOT SHEAR - (10EA/BX) DA VINCI

## (undated) DEVICE — NEEDLE INSFL 120MM 14GA VRRS - (20/BX)

## (undated) DEVICE — SEAL CANNULA STAPLER 12 MM (10EA/BX)

## (undated) DEVICE — BLADE SURGICAL #15 - (50/BX 3BX/CA)

## (undated) DEVICE — KIT CUSTOM PROCEDURE SINGLE FOR ENDO (15/CA)

## (undated) DEVICE — TUBING CLEARLINK DUO-VENT - C-FLO (48EA/CA)

## (undated) DEVICE — FORCEPS PROGRASP (18UN/EA)

## (undated) DEVICE — SUTURE 2-0 VICRYL PLUS SH - 27 INCH (36/BX)

## (undated) DEVICE — CANISTER SUCTION 3000ML MECHANICAL FILTER AUTO SHUTOFF MEDI-VAC NONSTERILE LF DISP  (40EA/CA)

## (undated) DEVICE — BALLOON RIGIFLEX SINGLE USE ABD 35MM

## (undated) DEVICE — CLIP APPLIER LARGE DA VINCI 100X'S REUSABLE

## (undated) DEVICE — ROBOTIC SURGERY SERVICES

## (undated) DEVICE — SPONGE GAUZE NON-STERILE 4X4 - (2000/CA 10PK/CA)

## (undated) DEVICE — PACK LAP CHOLE OR - (2EA/CA)

## (undated) DEVICE — AIRLESS LAP SPRAY TIP VISTASEAL (3EA/BX)

## (undated) DEVICE — GLOVE SZ 6.5 BIOGEL PI MICRO - PF LF (50PR/BX)

## (undated) DEVICE — TROCAR 5X100 NON BLADED Z-TH - READ KII (6/BX)

## (undated) DEVICE — ARMREST CRADLE FOAM - (2PR/PK 12PR/CA)

## (undated) DEVICE — GOWN WARMING STANDARD FLEX - (30/CA)

## (undated) DEVICE — BIPOLAR FORCE DA VINCI 12X'S REISABLE

## (undated) DEVICE — ADHESIVE DERMABOND HVD MINI (12EA/BX)

## (undated) DEVICE — SUTURE 0 VICRYL PLUS UR-6 - 27 INCH (36/BX)

## (undated) DEVICE — SUTURE2-0 27IN VCRL ANTI VIOL (36PK/BX)

## (undated) DEVICE — TUBE CONNECTING SUCTION - CLEAR PLASTIC STERILE 72 IN (50EA/CA)

## (undated) DEVICE — RELOAD STAPLER SUREFORM 60 BLUE (12EA/BX)

## (undated) DEVICE — SUTURE GENERAL

## (undated) DEVICE — TUBE NG SALEM SUMP 16FR (50EA/CA)

## (undated) DEVICE — SODIUM CHL IRRIGATION 0.9% 1000ML (12EA/CA)

## (undated) DEVICE — SUTURE 2-0 ETHIBOND CT-2 (12PK/BX)

## (undated) DEVICE — GLOVE SZ 7.5 BIOGEL PI MICRO - PF LF (50PR/BX)

## (undated) DEVICE — TUBE E-T HI-LO CUFF 6.5MM (10EA/BX)

## (undated) DEVICE — BLOCK BITE ENDOSCOPIC 2809 - (100/BX) INTERMEDIATE

## (undated) DEVICE — TOWEL STOP TIMEOUT SAFETY FLAG (40EA/CA)

## (undated) DEVICE — SEALANT TISSUE CLOSURE KIT FIBIRIN VISTASEAL 10ML (1EA/BX)

## (undated) DEVICE — STAPLER SUREFORM 60 12 MM (6EA/BX)

## (undated) DEVICE — COVER MAYO STAND X-LG - (22EA/CA)

## (undated) DEVICE — DRAIN PENROSE STERILE 1/4 X - 18 IN  (25EA/BX)

## (undated) DEVICE — SUTURE 0 LIGATING REEL VICRYL PLUS (12PK/BX)